# Patient Record
Sex: MALE | Race: WHITE | NOT HISPANIC OR LATINO | Employment: OTHER | URBAN - METROPOLITAN AREA
[De-identification: names, ages, dates, MRNs, and addresses within clinical notes are randomized per-mention and may not be internally consistent; named-entity substitution may affect disease eponyms.]

---

## 2017-01-03 ENCOUNTER — LAB VISIT (OUTPATIENT)
Dept: LAB | Facility: HOSPITAL | Age: 72
End: 2017-01-03
Attending: UROLOGY
Payer: COMMERCIAL

## 2017-01-03 ENCOUNTER — OFFICE VISIT (OUTPATIENT)
Dept: UROLOGY | Facility: CLINIC | Age: 72
End: 2017-01-03
Payer: COMMERCIAL

## 2017-01-03 VITALS
SYSTOLIC BLOOD PRESSURE: 139 MMHG | HEART RATE: 68 BPM | BODY MASS INDEX: 26.68 KG/M2 | RESPIRATION RATE: 16 BRPM | WEIGHT: 170 LBS | DIASTOLIC BLOOD PRESSURE: 75 MMHG | HEIGHT: 67 IN

## 2017-01-03 DIAGNOSIS — N13.8 BPH (BENIGN PROSTATIC HYPERTROPHY) WITH URINARY OBSTRUCTION: ICD-10-CM

## 2017-01-03 DIAGNOSIS — N13.8 BPH (BENIGN PROSTATIC HYPERTROPHY) WITH URINARY OBSTRUCTION: Primary | ICD-10-CM

## 2017-01-03 DIAGNOSIS — N40.1 BPH (BENIGN PROSTATIC HYPERTROPHY) WITH URINARY OBSTRUCTION: ICD-10-CM

## 2017-01-03 DIAGNOSIS — N40.2 PROSTATE NODULE: ICD-10-CM

## 2017-01-03 DIAGNOSIS — N40.2 PROSTATE NODULE: Primary | ICD-10-CM

## 2017-01-03 DIAGNOSIS — N40.1 BPH (BENIGN PROSTATIC HYPERTROPHY) WITH URINARY OBSTRUCTION: Primary | ICD-10-CM

## 2017-01-03 LAB
COMPLEXED PSA SERPL-MCNC: 2.1 NG/ML
TESTOST SERPL-MCNC: 545 NG/DL

## 2017-01-03 PROCEDURE — 1157F ADVNC CARE PLAN IN RCRD: CPT | Mod: S$GLB,,, | Performed by: UROLOGY

## 2017-01-03 PROCEDURE — 1160F RVW MEDS BY RX/DR IN RCRD: CPT | Mod: S$GLB,,, | Performed by: UROLOGY

## 2017-01-03 PROCEDURE — 1159F MED LIST DOCD IN RCRD: CPT | Mod: S$GLB,,, | Performed by: UROLOGY

## 2017-01-03 PROCEDURE — 3078F DIAST BP <80 MM HG: CPT | Mod: S$GLB,,, | Performed by: UROLOGY

## 2017-01-03 PROCEDURE — 99999 PR PBB SHADOW E&M-EST. PATIENT-LVL III: CPT | Mod: PBBFAC,,, | Performed by: UROLOGY

## 2017-01-03 PROCEDURE — 84153 ASSAY OF PSA TOTAL: CPT

## 2017-01-03 PROCEDURE — 84403 ASSAY OF TOTAL TESTOSTERONE: CPT

## 2017-01-03 PROCEDURE — 36415 COLL VENOUS BLD VENIPUNCTURE: CPT

## 2017-01-03 PROCEDURE — 3075F SYST BP GE 130 - 139MM HG: CPT | Mod: S$GLB,,, | Performed by: UROLOGY

## 2017-01-03 PROCEDURE — 1126F AMNT PAIN NOTED NONE PRSNT: CPT | Mod: S$GLB,,, | Performed by: UROLOGY

## 2017-01-03 PROCEDURE — 99213 OFFICE O/P EST LOW 20 MIN: CPT | Mod: S$GLB,,, | Performed by: UROLOGY

## 2017-01-03 NOTE — PROGRESS NOTES
Subjective:       Patient ID: Alejandro Wayne is a 71 y.o. male.    Chief Complaint: No chief complaint on file.    HPI Comments: A 69-year-old well known to me, here in followup.   He has had a history of prostate nodule and had a prostate biopsy back in 03/2008.   It was benign. Also had an episode of hematuria a couple of years ago.   Cystoscopy was normal at that time. He has had no subsequent episodes. Rare nocturia, ED not an issue. Rare urgency if he waits too long to void.  He has been doing very well.   He has noted some urgency, minimal bother.  Thelma doing well.  PSA and testosterone pending.    Lab Results       Component                Value               Date                       PSA                      2.1                 07/09/2015                 PSA                      1.9                 02/10/2014                 PSA                      2.32                04/16/2013                 PSA                      1.86                05/15/2012                 PSA                      1.6                 04/07/2011                 PSA                      1.6                 03/10/2009                 PSA                      1.6                 03/06/2008                 PSA                      1.5                 10/26/2006               Review of Systems   Constitutional: Negative for appetite change, chills, fatigue, fever and unexpected weight change.   HENT: Negative for nosebleeds.    Respiratory: Negative for shortness of breath and wheezing.    Cardiovascular: Negative for chest pain, palpitations and leg swelling.   Gastrointestinal: Negative for abdominal distention, abdominal pain, constipation, diarrhea, nausea and vomiting.   Genitourinary: Positive for urgency. Negative for dysuria, hematuria and nocturia.   Musculoskeletal: Negative for arthralgias and back pain.   Skin: Negative for pallor.   Neurological: Negative for dizziness, seizures and syncope.   Hematological: Negative for  adenopathy.   Psychiatric/Behavioral: Negative for dysphoric mood.       Objective:      Physical Exam   Nursing note and vitals reviewed.  Constitutional: He is oriented to person, place, and time. He appears well-developed and well-nourished. No distress.   HENT:   Head: Normocephalic and atraumatic.   Eyes: Pupils are equal, round, and reactive to light.   Neck: No tracheal deviation present. No thyromegaly present.   Cardiovascular: Normal rate.    Pulmonary/Chest: Effort normal. No respiratory distress. He has no wheezes.   Abdominal: Soft. Bowel sounds are normal. He exhibits no distension and no mass. There is no tenderness. There is no rebound and no guarding.   Genitourinary: Rectum normal, testes normal and penis normal. Rectal exam shows no external hemorrhoid, no internal hemorrhoid, no mass and no tenderness. Right testis shows no mass. Left testis shows no mass.       Genitourinary Comments: Stable nodule   Lymphadenopathy:     He has no cervical adenopathy.   Neurological: He is alert and oriented to person, place, and time.   Skin: Skin is warm and dry. He is not diaphoretic.     Psychiatric: He has a normal mood and affect. His behavior is normal. Judgment and thought content normal.       Assessment:       1. BPH (benign prostatic hypertrophy) with urinary obstruction        Plan:         Await PSA and testosterone.  Not interested in BPH therapy at this time.  1 year with PSA.

## 2017-01-03 NOTE — MR AVS SNAPSHOT
[facility]  [address]  [facility] [address]  Phone: [phone]                  Alejandro Wayne   1/3/2017 3:45 PM   Office Visit    Description:  Male : 1945   Provider:  Toñito Herman MD   Department:  [facility]           Reason for Visit     Prostate Nodule     Benign Prostatic Hypertrophy           Diagnoses this Visit        Comments    BPH (benign prostatic hypertrophy) with urinary obstruction    -  Primary            To Do List           Future Appointments        Provider Department Dept Phone    1/3/2017 3:45 PM Toñito Herman MD [facility] [phone]      Goals (5 Years of Data)     None      Follow-Up and Disposition     Return in about 1 year (around 1/3/2018).      [facility]     [facility] -  Assistance  Registered nurses in the [facility] provide clinical advisement, health education, appointment booking, and other advisory services.  Call for this free service at [phone].             Medications           Message regarding Medications     Verify the changes and/or additions to your medication regime listed below are the same as discussed with your clinician today.  If any of these changes or additions are incorrect, please notify your healthcare provider.        STOP taking these medications     hydrocodone-acetaminophen 7.5-325mg (NORCO) 7.5-325 mg per tablet Take 1 tablet by mouth every 6 (six) hours as needed for Pain.    typhoid (VIVOTIF) DR capsule Take one tablet every other day for 4 doses           Verify that the below list of medications is an accurate representation of the medications you are currently taking.  If none reported, the list may be blank. If incorrect, please contact your healthcare provider. Carry this list with you in case of emergency.           Current Medications     amlodipine (NORVASC) 5 MG tablet Take 1 tablet (5 mg total) by mouth once  "daily.    ascorbic acid (VITAMIN C) 1000 MG tablet Take 1,000 mg by mouth once daily.    aspirin (ECOTRIN) 81 MG EC tablet Take 81 mg by mouth once daily.    cetirizine (ZYRTEC) 10 MG tablet Take 10 mg by mouth once daily.    eszopiclone (LUNESTA) 2 MG Tab TAKE 1 TABLET BY MOUTH EVERY EVENING    multivitamin (MULTIVITAMIN) per tablet Take 1 tablet by mouth once daily.    rosuvastatin (CRESTOR) 10 MG tablet Take 1 tablet (10 mg total) by mouth once daily.    vitamin E 1000 UNIT capsule Take 1,000 Units by mouth once daily.    ciprofloxacin HCl (CILOXAN) 0.3 % ophthalmic solution PLACE 1 DROP INTO BOTH EYES EVERY 2 HOURS AS DIRECTED    cyclobenzaprine (FLEXERIL) 5 MG tablet            Clinical Reference Information           Vital Signs - Last Recorded  Most recent update: 1/3/2017  3:16 PM by Madina Sharpe RN    BP Pulse Resp Ht Wt BMI    139/75 (BP Location: Left arm, Patient Position: Sitting, BP Method: Automatic) 68 16 5' 7" (1.702 m) 77.1 kg (170 lb) 26.63 kg/m2      Blood Pressure          Most Recent Value    BP  139/75      Allergies as of 1/3/2017     No Known Allergies      Immunizations Administered on Date of Encounter - 1/3/2017     None      Orders Placed During Today's Visit     Future Labs/Procedures Expected by Expires    Prostate Specific Antigen, Diagnostic  1/1/2018 1/23/2019      "

## 2017-01-30 RX ORDER — AMLODIPINE BESYLATE 5 MG/1
TABLET ORAL
Qty: 90 TABLET | Refills: 0 | Status: SHIPPED | OUTPATIENT
Start: 2017-01-30 | End: 2018-05-08 | Stop reason: SDUPTHER

## 2017-01-30 RX ORDER — CIPROFLOXACIN 500 MG/1
TABLET ORAL
Qty: 20 TABLET | Refills: 3 | Status: SHIPPED | OUTPATIENT
Start: 2017-01-30 | End: 2017-05-26 | Stop reason: SDUPTHER

## 2017-05-01 RX ORDER — AMLODIPINE BESYLATE 5 MG/1
TABLET ORAL
Qty: 90 TABLET | Refills: 3 | Status: SHIPPED | OUTPATIENT
Start: 2017-05-01 | End: 2018-05-08 | Stop reason: SDUPTHER

## 2017-05-26 RX ORDER — CIPROFLOXACIN 500 MG/1
TABLET ORAL
Qty: 20 TABLET | Refills: 0 | Status: SHIPPED | OUTPATIENT
Start: 2017-05-26 | End: 2017-06-02 | Stop reason: SDUPTHER

## 2017-06-05 RX ORDER — CIPROFLOXACIN 500 MG/1
TABLET ORAL
Qty: 20 TABLET | Refills: 0 | Status: SHIPPED | OUTPATIENT
Start: 2017-06-05 | End: 2017-06-12 | Stop reason: SDUPTHER

## 2017-06-12 RX ORDER — CIPROFLOXACIN 500 MG/1
TABLET ORAL
Qty: 20 TABLET | Refills: 0 | Status: SHIPPED | OUTPATIENT
Start: 2017-06-12 | End: 2017-06-19 | Stop reason: SDUPTHER

## 2017-06-19 RX ORDER — CIPROFLOXACIN 500 MG/1
TABLET ORAL
Qty: 20 TABLET | Refills: 0 | Status: SHIPPED | OUTPATIENT
Start: 2017-06-19 | End: 2017-06-27 | Stop reason: SDUPTHER

## 2017-06-27 RX ORDER — CIPROFLOXACIN 500 MG/1
TABLET ORAL
Qty: 20 TABLET | Refills: 0 | Status: SHIPPED | OUTPATIENT
Start: 2017-06-27 | End: 2018-01-07 | Stop reason: SDUPTHER

## 2017-07-03 RX ORDER — ESZOPICLONE 2 MG/1
TABLET, FILM COATED ORAL
Qty: 30 TABLET | Refills: 5 | Status: SHIPPED | OUTPATIENT
Start: 2017-07-03 | End: 2018-01-15 | Stop reason: SDUPTHER

## 2017-07-12 RX ORDER — CIPROFLOXACIN 500 MG/1
TABLET ORAL
Qty: 20 TABLET | Refills: 0 | OUTPATIENT
Start: 2017-07-12

## 2017-10-23 ENCOUNTER — TELEPHONE (OUTPATIENT)
Dept: ENDOCRINOLOGY | Facility: CLINIC | Age: 72
End: 2017-10-23

## 2017-10-23 DIAGNOSIS — I10 HYPERTENSION, UNSPECIFIED TYPE: Primary | ICD-10-CM

## 2017-11-20 RX ORDER — DICYCLOMINE HYDROCHLORIDE 10 MG/1
10 CAPSULE ORAL EVERY 6 HOURS PRN
Qty: 60 CAPSULE | Refills: 3 | Status: SHIPPED | OUTPATIENT
Start: 2017-11-20 | End: 2017-12-20

## 2017-11-20 RX ORDER — ROSUVASTATIN CALCIUM 10 MG/1
TABLET, COATED ORAL
Qty: 90 TABLET | Refills: 3 | Status: SHIPPED | OUTPATIENT
Start: 2017-11-20 | End: 2018-06-04 | Stop reason: SDUPTHER

## 2018-01-08 RX ORDER — CIPROFLOXACIN 500 MG/1
TABLET ORAL
Qty: 20 TABLET | Refills: 0 | Status: SHIPPED | OUTPATIENT
Start: 2018-01-08 | End: 2018-05-08 | Stop reason: SDUPTHER

## 2018-01-16 RX ORDER — ESZOPICLONE 2 MG/1
2 TABLET, FILM COATED ORAL NIGHTLY
Qty: 30 TABLET | Refills: 5 | Status: SHIPPED | OUTPATIENT
Start: 2018-01-16 | End: 2018-07-24 | Stop reason: SDUPTHER

## 2018-03-13 ENCOUNTER — TELEPHONE (OUTPATIENT)
Dept: ENDOCRINOLOGY | Facility: CLINIC | Age: 73
End: 2018-03-13

## 2018-03-13 RX ORDER — OSELTAMIVIR PHOSPHATE 75 MG/1
75 CAPSULE ORAL 2 TIMES DAILY
Qty: 10 CAPSULE | Refills: 1 | Status: SHIPPED | OUTPATIENT
Start: 2018-03-13 | End: 2018-03-18

## 2018-05-08 RX ORDER — AMLODIPINE BESYLATE 5 MG/1
TABLET ORAL
Qty: 90 TABLET | Refills: 0 | OUTPATIENT
Start: 2018-05-08

## 2018-05-08 RX ORDER — CIPROFLOXACIN 500 MG/1
TABLET ORAL
Qty: 20 TABLET | Refills: 0 | OUTPATIENT
Start: 2018-05-08

## 2018-05-10 RX ORDER — AMLODIPINE BESYLATE 5 MG/1
5 TABLET ORAL DAILY
Qty: 90 TABLET | Refills: 0 | Status: SHIPPED | OUTPATIENT
Start: 2018-05-10 | End: 2018-06-04 | Stop reason: SDUPTHER

## 2018-05-10 RX ORDER — CIPROFLOXACIN 500 MG/1
500 TABLET ORAL 2 TIMES DAILY
Qty: 20 TABLET | Refills: 0 | Status: SHIPPED | OUTPATIENT
Start: 2018-05-10 | End: 2018-10-02

## 2018-06-04 ENCOUNTER — LAB VISIT (OUTPATIENT)
Dept: LAB | Facility: HOSPITAL | Age: 73
End: 2018-06-04
Attending: INTERNAL MEDICINE
Payer: COMMERCIAL

## 2018-06-04 ENCOUNTER — OFFICE VISIT (OUTPATIENT)
Dept: ENDOCRINOLOGY | Facility: CLINIC | Age: 73
End: 2018-06-04
Payer: COMMERCIAL

## 2018-06-04 VITALS
HEART RATE: 82 BPM | BODY MASS INDEX: 26.71 KG/M2 | SYSTOLIC BLOOD PRESSURE: 128 MMHG | WEIGHT: 170.19 LBS | HEIGHT: 67 IN | DIASTOLIC BLOOD PRESSURE: 74 MMHG

## 2018-06-04 DIAGNOSIS — I10 ESSENTIAL HYPERTENSION: Primary | ICD-10-CM

## 2018-06-04 DIAGNOSIS — E78.00 HIGH CHOLESTEROL: ICD-10-CM

## 2018-06-04 DIAGNOSIS — I10 HYPERTENSION, UNSPECIFIED TYPE: ICD-10-CM

## 2018-06-04 LAB
25(OH)D3+25(OH)D2 SERPL-MCNC: 32 NG/ML
ALBUMIN SERPL BCP-MCNC: 4 G/DL
ALP SERPL-CCNC: 60 U/L
ALT SERPL W/O P-5'-P-CCNC: 23 U/L
ANION GAP SERPL CALC-SCNC: 12 MMOL/L
AST SERPL-CCNC: 25 U/L
BASOPHILS # BLD AUTO: 0.1 K/UL
BASOPHILS NFR BLD: 1.6 %
BILIRUB SERPL-MCNC: 0.5 MG/DL
BUN SERPL-MCNC: 15 MG/DL
CALCIUM SERPL-MCNC: 9.3 MG/DL
CHLORIDE SERPL-SCNC: 106 MMOL/L
CHOLEST SERPL-MCNC: 136 MG/DL
CHOLEST/HDLC SERPL: 2.3 {RATIO}
CO2 SERPL-SCNC: 22 MMOL/L
COMPLEXED PSA SERPL-MCNC: 2.3 NG/ML
CREAT SERPL-MCNC: 0.8 MG/DL
DIFFERENTIAL METHOD: ABNORMAL
EOSINOPHIL # BLD AUTO: 0.2 K/UL
EOSINOPHIL NFR BLD: 3.1 %
ERYTHROCYTE [DISTWIDTH] IN BLOOD BY AUTOMATED COUNT: 13 %
ERYTHROCYTE [SEDIMENTATION RATE] IN BLOOD BY WESTERGREN METHOD: 4 MM/HR
EST. GFR  (AFRICAN AMERICAN): >60 ML/MIN/1.73 M^2
EST. GFR  (NON AFRICAN AMERICAN): >60 ML/MIN/1.73 M^2
GLUCOSE SERPL-MCNC: 108 MG/DL
HCT VFR BLD AUTO: 44.5 %
HDLC SERPL-MCNC: 58 MG/DL
HDLC SERPL: 42.6 %
HGB BLD-MCNC: 14.1 G/DL
IMM GRANULOCYTES # BLD AUTO: 0.03 K/UL
IMM GRANULOCYTES NFR BLD AUTO: 0.5 %
LDLC SERPL CALC-MCNC: 67.6 MG/DL
LYMPHOCYTES # BLD AUTO: 1.2 K/UL
LYMPHOCYTES NFR BLD: 19.1 %
MCH RBC QN AUTO: 29.4 PG
MCHC RBC AUTO-ENTMCNC: 31.7 G/DL
MCV RBC AUTO: 93 FL
MONOCYTES # BLD AUTO: 0.7 K/UL
MONOCYTES NFR BLD: 11.4 %
NEUTROPHILS # BLD AUTO: 3.9 K/UL
NEUTROPHILS NFR BLD: 64.3 %
NONHDLC SERPL-MCNC: 78 MG/DL
NRBC BLD-RTO: 0 /100 WBC
PLATELET # BLD AUTO: 246 K/UL
PMV BLD AUTO: 9.8 FL
POTASSIUM SERPL-SCNC: 4.4 MMOL/L
PROT SERPL-MCNC: 7.1 G/DL
RBC # BLD AUTO: 4.79 M/UL
SODIUM SERPL-SCNC: 140 MMOL/L
TRIGL SERPL-MCNC: 52 MG/DL
TSH SERPL DL<=0.005 MIU/L-ACNC: 2.33 UIU/ML
WBC # BLD AUTO: 6.07 K/UL

## 2018-06-04 PROCEDURE — 99999 PR PBB SHADOW E&M-EST. PATIENT-LVL III: CPT | Mod: PBBFAC,,, | Performed by: INTERNAL MEDICINE

## 2018-06-04 PROCEDURE — 85651 RBC SED RATE NONAUTOMATED: CPT

## 2018-06-04 PROCEDURE — 80053 COMPREHEN METABOLIC PANEL: CPT

## 2018-06-04 PROCEDURE — 99214 OFFICE O/P EST MOD 30 MIN: CPT | Mod: S$GLB,,, | Performed by: INTERNAL MEDICINE

## 2018-06-04 PROCEDURE — 84443 ASSAY THYROID STIM HORMONE: CPT

## 2018-06-04 PROCEDURE — 3074F SYST BP LT 130 MM HG: CPT | Mod: CPTII,S$GLB,, | Performed by: INTERNAL MEDICINE

## 2018-06-04 PROCEDURE — 3078F DIAST BP <80 MM HG: CPT | Mod: CPTII,S$GLB,, | Performed by: INTERNAL MEDICINE

## 2018-06-04 PROCEDURE — 84153 ASSAY OF PSA TOTAL: CPT

## 2018-06-04 PROCEDURE — 82306 VITAMIN D 25 HYDROXY: CPT

## 2018-06-04 PROCEDURE — 80061 LIPID PANEL: CPT

## 2018-06-04 PROCEDURE — 85025 COMPLETE CBC W/AUTO DIFF WBC: CPT

## 2018-06-04 PROCEDURE — 36415 COLL VENOUS BLD VENIPUNCTURE: CPT

## 2018-06-04 RX ORDER — ROSUVASTATIN CALCIUM 10 MG/1
10 TABLET, COATED ORAL DAILY
Qty: 90 TABLET | Refills: 3 | Status: SHIPPED | OUTPATIENT
Start: 2018-06-04 | End: 2019-02-20 | Stop reason: SDUPTHER

## 2018-06-04 RX ORDER — AMLODIPINE BESYLATE 5 MG/1
5 TABLET ORAL DAILY
Qty: 90 TABLET | Refills: 3 | Status: SHIPPED | OUTPATIENT
Start: 2018-06-04 | End: 2019-02-20 | Stop reason: SDUPTHER

## 2018-06-04 NOTE — PROGRESS NOTES
"Subjective:      Patient ID: Alejandro Wayne is a 73 y.o. male.    Chief Complaint:  Follow-up    History of Present Illness  70 y.o. Wm with pmhx HTN, HLD, chronic b/l Knee pain, hx prostate nodule s/p resection, hx of SCC of thigh s/p shave biopsy here for executive wellness visit complaining of chronic diarrhea. Patient reports episodes began approximately 4 months ago after having an episode of "bone-shaking" chills. Patient reports having to jump into the shower to stay warm. Reports he has been having both increased belching as well as flatulence ever since then. Reports greenish stool, sometimes watery, mixed with both floating mucous component and heavier components that sink. Reports very foul smelling. Denies any blood or dark tarry stools. Reports his wife has also started having such symptoms, but only once a day. Patient does report he takes mobic 15 mg daily, but also has been taking an advil every morning when he wakes up and one before he goes to sleep. Patient also reports taking tylenol PM to help him sleep at night. In addition to these medications, he drinks 3-4 glasses of champagne nightly. Patient does report remote history of travelling to [place] before these episodes began. Denies fever, fatigue. Reports energy has been unchanged. Does have ~5# weight loss which he has been trying to lose through proper diet. He denies chest pain, shortness of breath, nausea. Does report mild reflux symptoms. Denies any changes to urination, dysuria or hematuria. Does report chronic bilateral knee pain which he takes previously mentioned NSAIDs and tylenol for. 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 His last C-scope was 2009 which showed diverticulosis and is to have repeat in 7 years (due 4/2016). Patient did stool sampling after previous visit which was unrevealing.     Review of Systems   Constitutional: Positive " for chills (2 episodes in the past 4 months, first time when patient started experiencing diarrhea). Negative for fatigue, fever and unexpected weight change.   HENT: Negative for congestion, hearing loss, sinus pressure, sore throat and trouble swallowing.    Eyes: Negative for redness, itching and visual disturbance.   Respiratory: Negative for cough, chest tightness, shortness of breath and wheezing.    Cardiovascular: Negative for chest pain, palpitations and leg swelling.   Gastrointestinal: Negative for abdominal distention, abdominal pain, blood in stool, constipation, nausea and vomiting.        (+) tenesmus (+) 4-5 loose green stools per day (+) change in stool caliber (+)increased flattus and belching   Endocrine: Negative for cold intolerance, heat intolerance, polydipsia and polyuria.   Genitourinary: Negative for dysuria, flank pain, frequency and hematuria.   Musculoskeletal: Positive for arthralgias (bilateral knee pain). Negative for back pain, gait problem and myalgias.   Skin: Negative for color change, pallor and rash.   Neurological: Negative for dizziness, syncope, weakness, numbness and headaches.   Hematological: Does not bruise/bleed easily.   Psychiatric/Behavioral: Negative for dysphoric mood and sleep disturbance. The patient is not nervous/anxious.      Objective:   Physical Exam   Constitutional: He is oriented to person, place, and time. He appears well-developed and well-nourished. No distress.   HENT:   Head: Normocephalic and atraumatic.   Nose: Nose normal.   Mouth/Throat: Oropharynx is clear and moist. No oropharyngeal exudate.   Eyes: EOM are normal. Pupils are equal, round, and reactive to light. No scleral icterus.   Neck: Normal range of motion. Neck supple. No JVD present. No tracheal deviation present. No thyromegaly present.   Cardiovascular: Normal rate, regular rhythm and normal heart sounds.  Exam reveals no gallop and no friction rub.    No murmur  "heard.  Pulmonary/Chest: Effort normal and breath sounds normal. No respiratory distress. He has no wheezes. He has no rales.   Abdominal: Soft. Bowel sounds are normal. He exhibits no distension, no abdominal bruit and no mass. There is no tenderness. There is no rebound, no guarding, no tenderness at McBurney's point and negative Rodriguez's sign. No hernia.   Musculoskeletal: Normal range of motion. He exhibits no edema or tenderness.   Lymphadenopathy:     He has no cervical adenopathy.   Neurological: He is alert and oriented to person, place, and time. He has normal strength. No cranial nerve deficit or sensory deficit. He exhibits normal muscle tone. Coordination and gait normal.   Reflex Scores:       Tricep reflexes are 2+ on the right side and 2+ on the left side.       Bicep reflexes are 2+ on the right side and 2+ on the left side.       Brachioradialis reflexes are 2+ on the right side and 2+ on the left side.       Patellar reflexes are 2+ on the right side and 2+ on the left side.       Achilles reflexes are 2+ on the right side and 2+ on the left side.  Skin: Skin is warm and dry. No rash noted. He is not diaphoretic. No pallor.   Psychiatric: He has a normal mood and affect. His behavior is normal. Judgment and thought content normal.       /74   Pulse 82   Ht 5' 7" (1.702 m)   Wt 77.2 kg (170 lb 3.1 oz)   BMI 26.66 kg/m²      Lab Review:   Results for orders placed or performed in visit on 06/04/18   CBC auto differential   Result Value Ref Range    WBC 6.07 3.90 - 12.70 K/uL    RBC 4.79 4.60 - 6.20 M/uL    Hemoglobin 14.1 14.0 - 18.0 g/dL    Hematocrit 44.5 40.0 - 54.0 %    MCV 93 82 - 98 fL    MCH 29.4 27.0 - 31.0 pg    MCHC 31.7 (L) 32.0 - 36.0 g/dL    RDW 13.0 11.5 - 14.5 %    Platelets 246 150 - 350 K/uL    MPV 9.8 9.2 - 12.9 fL    Immature Granulocytes 0.5 0.0 - 0.5 %    Gran # (ANC) 3.9 1.8 - 7.7 K/uL    Immature Grans (Abs) 0.03 0.00 - 0.04 K/uL    Lymph # 1.2 1.0 - 4.8 K/uL    Mono " # 0.7 0.3 - 1.0 K/uL    Eos # 0.2 0.0 - 0.5 K/uL    Baso # 0.10 0.00 - 0.20 K/uL    nRBC 0 0 /100 WBC    Gran% 64.3 38.0 - 73.0 %    Lymph% 19.1 18.0 - 48.0 %    Mono% 11.4 4.0 - 15.0 %    Eosinophil% 3.1 0.0 - 8.0 %    Basophil% 1.6 0.0 - 1.9 %    Differential Method Automated      Results for orders placed or performed in visit on 06/04/18   CBC auto differential   Result Value Ref Range    WBC 6.07 3.90 - 12.70 K/uL    RBC 4.79 4.60 - 6.20 M/uL    Hemoglobin 14.1 14.0 - 18.0 g/dL    Hematocrit 44.5 40.0 - 54.0 %    MCV 93 82 - 98 fL    MCH 29.4 27.0 - 31.0 pg    MCHC 31.7 (L) 32.0 - 36.0 g/dL    RDW 13.0 11.5 - 14.5 %    Platelets 246 150 - 350 K/uL    MPV 9.8 9.2 - 12.9 fL    Immature Granulocytes 0.5 0.0 - 0.5 %    Gran # (ANC) 3.9 1.8 - 7.7 K/uL    Immature Grans (Abs) 0.03 0.00 - 0.04 K/uL    Lymph # 1.2 1.0 - 4.8 K/uL    Mono # 0.7 0.3 - 1.0 K/uL    Eos # 0.2 0.0 - 0.5 K/uL    Baso # 0.10 0.00 - 0.20 K/uL    nRBC 0 0 /100 WBC    Gran% 64.3 38.0 - 73.0 %    Lymph% 19.1 18.0 - 48.0 %    Mono% 11.4 4.0 - 15.0 %    Eosinophil% 3.1 0.0 - 8.0 %    Basophil% 1.6 0.0 - 1.9 %    Differential Method Automated         Assessment:       Plan:     70 y.o. Wm with pmhx HTN, HLD, chronic b/l Knee pain, hx prostate nodule s/p resection, hx of SCC of thigh s/p shave biopsy here for executive wellness visit complaining of chronic diarrhea    1. Chronic diarrhea   - ddx very broad --- he has been using NSAIDs daily which can cause GI upset; recent travel to [place] concerning with tropical sprue; possible celiac disease   - will obtain CBC, CMP, TSH, hep panel, Vit D, Amylase, Lipase, CRP, ESR, repeat stool studies, Celiac disease panel   - will obtain imaging -- US abdomen complete and CT abdomen w/ and w/o contrast unless GI decides they are unwarratned   - referred to GI for evaluation; has appointment on 11/24/15. Patient due for c-scope in 4 months, possibly both EGD and C-scope?  2. HTN   - controlled, at goal,  continue current meds   - no hypotensive episodes  3. HLD   - on Crestor 10 mg  4. B/l chronic knee pain   - suggested patient stop taking NSAIDs and just continue with tylenol for now to see if his GI symptoms improve    Overall pt seems to be in good health.

## 2018-07-30 RX ORDER — ESZOPICLONE 2 MG/1
TABLET, FILM COATED ORAL
Qty: 30 TABLET | Refills: 0 | Status: SHIPPED | OUTPATIENT
Start: 2018-07-30 | End: 2018-09-11 | Stop reason: SDUPTHER

## 2018-08-29 ENCOUNTER — PATIENT MESSAGE (OUTPATIENT)
Dept: DERMATOLOGY | Facility: CLINIC | Age: 73
End: 2018-08-29

## 2018-09-11 RX ORDER — ESZOPICLONE 2 MG/1
TABLET, FILM COATED ORAL
Qty: 30 TABLET | Refills: 5 | Status: SHIPPED | OUTPATIENT
Start: 2018-09-11 | End: 2018-09-19 | Stop reason: ALTCHOICE

## 2018-09-19 ENCOUNTER — TELEPHONE (OUTPATIENT)
Dept: INFECTIOUS DISEASES | Facility: CLINIC | Age: 73
End: 2018-09-19

## 2018-09-19 DIAGNOSIS — Z71.84 COUNSELING FOR TRAVEL: Primary | ICD-10-CM

## 2018-09-19 RX ORDER — ZOLPIDEM TARTRATE 5 MG/1
5 TABLET ORAL NIGHTLY PRN
Qty: 15 TABLET | Refills: 0 | Status: SHIPPED | OUTPATIENT
Start: 2018-09-19 | End: 2018-10-02

## 2018-09-19 RX ORDER — AZITHROMYCIN 500 MG/1
TABLET, FILM COATED ORAL
Qty: 8 TABLET | Refills: 0 | Status: SHIPPED | OUTPATIENT
Start: 2018-09-19 | End: 2018-10-02

## 2018-09-30 ENCOUNTER — PATIENT MESSAGE (OUTPATIENT)
Dept: ENDOSCOPY | Facility: HOSPITAL | Age: 73
End: 2018-09-30

## 2018-09-30 ENCOUNTER — TELEPHONE (OUTPATIENT)
Dept: ENDOSCOPY | Facility: HOSPITAL | Age: 73
End: 2018-09-30

## 2018-09-30 DIAGNOSIS — R14.3 FLATUS: ICD-10-CM

## 2018-09-30 DIAGNOSIS — R14.0 BLOATING: Primary | ICD-10-CM

## 2018-09-30 DIAGNOSIS — K59.00 CONSTIPATION, ACUTE: ICD-10-CM

## 2018-10-02 ENCOUNTER — OFFICE VISIT (OUTPATIENT)
Dept: GASTROENTEROLOGY | Facility: CLINIC | Age: 73
End: 2018-10-02
Payer: COMMERCIAL

## 2018-10-02 ENCOUNTER — TELEPHONE (OUTPATIENT)
Dept: GASTROENTEROLOGY | Facility: CLINIC | Age: 73
End: 2018-10-02

## 2018-10-02 ENCOUNTER — HOSPITAL ENCOUNTER (OUTPATIENT)
Dept: RADIOLOGY | Facility: HOSPITAL | Age: 73
Discharge: HOME | End: 2018-10-02
Attending: INTERNAL MEDICINE
Payer: COMMERCIAL

## 2018-10-02 VITALS
WEIGHT: 172.38 LBS | HEART RATE: 67 BPM | HEIGHT: 67 IN | SYSTOLIC BLOOD PRESSURE: 124 MMHG | DIASTOLIC BLOOD PRESSURE: 78 MMHG | BODY MASS INDEX: 27.06 KG/M2

## 2018-10-02 DIAGNOSIS — Z87.19 HISTORY OF DIVERTICULITIS: ICD-10-CM

## 2018-10-02 DIAGNOSIS — R10.32 LLQ PAIN: Primary | ICD-10-CM

## 2018-10-02 DIAGNOSIS — R19.4 CHANGE IN BOWEL HABITS: ICD-10-CM

## 2018-10-02 DIAGNOSIS — R10.32 LLQ PAIN: ICD-10-CM

## 2018-10-02 DIAGNOSIS — R10.84 GENERALIZED ABDOMINAL PAIN: ICD-10-CM

## 2018-10-02 PROCEDURE — 25500020 PHARM REV CODE 255: Performed by: INTERNAL MEDICINE

## 2018-10-02 PROCEDURE — 99999 PR PBB SHADOW E&M-EST. PATIENT-LVL III: CPT | Mod: PBBFAC,,, | Performed by: INTERNAL MEDICINE

## 2018-10-02 PROCEDURE — 1101F PT FALLS ASSESS-DOCD LE1/YR: CPT | Mod: CPTII,S$GLB,, | Performed by: INTERNAL MEDICINE

## 2018-10-02 PROCEDURE — 3074F SYST BP LT 130 MM HG: CPT | Mod: CPTII,S$GLB,, | Performed by: INTERNAL MEDICINE

## 2018-10-02 PROCEDURE — 74178 CT ABD&PLV WO CNTR FLWD CNTR: CPT | Mod: 26,,, | Performed by: RADIOLOGY

## 2018-10-02 PROCEDURE — 99214 OFFICE O/P EST MOD 30 MIN: CPT | Mod: S$GLB,,, | Performed by: INTERNAL MEDICINE

## 2018-10-02 PROCEDURE — 3078F DIAST BP <80 MM HG: CPT | Mod: CPTII,S$GLB,, | Performed by: INTERNAL MEDICINE

## 2018-10-02 PROCEDURE — 74178 CT ABD&PLV WO CNTR FLWD CNTR: CPT | Mod: TC

## 2018-10-02 RX ORDER — AMOXICILLIN AND CLAVULANATE POTASSIUM 875; 125 MG/1; MG/1
1 TABLET, FILM COATED ORAL EVERY 12 HOURS
Qty: 20 TABLET | Refills: 0 | Status: SHIPPED | OUTPATIENT
Start: 2018-10-02 | End: 2018-10-09

## 2018-10-02 RX ADMIN — IOHEXOL 100 ML: 350 INJECTION, SOLUTION INTRAVENOUS at 06:10

## 2018-10-02 RX ADMIN — IOHEXOL 15 ML: 350 INJECTION, SOLUTION INTRAVENOUS at 05:10

## 2018-10-02 NOTE — PROGRESS NOTES
CHIEF COMPLAINT:  Constipation, left lower quadrant pain and history of   diverticulitis in the past.    HISTORY OF PRESENT ILLNESS:  This is a 73-year-old white male who states he has   had a several day history of left lower quadrant pain and constipation.  He is   about ready to go on a big trip tomorrow for about 3 days and then will be going   on a long trip a week later for about 3 weeks.  He denies any trauma to his   belly.  He has had a history of diverticulitis diagnosed by a CT scan in the   past back in November 2015, for which he took antibiotics and saw Colorectal   Surgery.  His colonoscopy is up-to-date, last on 06/22/2016.  He is not having   any fever or chills.  He does have belching and flatus.  He has not been on any   recent antibiotics.    REVIEW OF SYSTEMS:  CONSTITUTIONAL:  No fever or fatigue.  No weight loss.  ENT:  No difficulty swallowing.  No sore throat.  No odynophagia.  No dysphagia.  CARDIOVASCULAR:  No chest pain or palpitations.  RESPIRATORY:  No shortness of breath or cough.  GENITOURINARY:  No dysuria, urgency, frequency or hematuria.  No urinary   symptoms.  MUSCULOSKELETAL:  A little bit of osteoarthritis in his knees.  SKIN:  No itching or rash.  NEUROLOGIC:  No headache, syncope or stroke.  PSYCHIATRIC:  No uncontrolled depression or anxiety.  ENDOCRINE:  No cold or heat intolerance.  LYMPHATICS:  No lymphadenopathy.  GASTROINTESTINAL:  No nausea or vomiting.  No heartburn.  He does have some   belching and some flatus.  He is constipated.  He feels like a change in stool   caliber.    ALLERGIES:  HE HAS NO KNOWN DRUG ALLERGIES.    PAST MEDICAL HISTORY:  Positive for hypertension, hyperlipidemia, BPH, chronic   knee injury and history of diverticulitis in the past.    PAST SURGICAL HISTORY:  Knee arthroscopic surgery with debridement, hiatal   hernia repair, tonsillectomy, adenoidectomy and vasectomy.    FAMILY HISTORY:  Nobody with colon cancer.  Nobody with advanced colon    adenomatous polyps.  No FAP, no attenuated FAP, no MAP and no Maldonado syndrome.    Nobody with celiac sprue or inflammatory bowel disease.  Nobody with pancreatic   cancer, cirrhosis of the liver, liver cancer or pancreatitis.  No GI   malignancies.    SOCIAL HISTORY:  A nonsmoker.  Does not really drink alcohol anymore.  He is a   .  He is .  He has 2 children, a son who is about 39   and a daughter who is 40.    PHYSICAL EXAMINATION:  VITAL SIGNS:  With chaperone in the room, Ira, blood pressure is 124/78,   pulse is 67 and regular, 5 feet 7 inches tall, 172 pounds and BMI is 27.  GENERAL:  He is alert and oriented x4, not in any acute distress.  ABDOMEN:  Soft.  No guarding.  No rebound.  He has tenderness in his left lower   quadrant.  No Rodriguez sign.  No McBurney point tenderness.  No CVA tenderness.    Normoactive bowel sounds.  No bruits or pulsatile masses.  CARDIOVASCULAR:  S1 and S2 without murmurs, gallops or rubs.  RESPIRATORY:  Clear to auscultation bilaterally without wheezes, rhonchi or   rales.  SKIN:  No petechiae or rash on exposed skin areas.  NEUROLOGIC:  Alert and oriented x4.  PSYCHIATRIC:  Normal speech, mentation and affect.  LYMPHATICS:  No cervical or supraclavicular lymphadenopathy.  ENDOCRINE:  No appreciative thyromegaly.  SKIN:  No rash.  Nothing appears to be shingles.    MEDICAL DECISION MAKING:  As above.  Prior CT scan images reviewed.  Last   colonoscopy images reviewed.  CT scan talk given with IV contrast.  Antibiotic   talk for possible diverticulitis.    IMPRESSION AND PLAN:  Change in bowel habits, left lower quadrant pain and   history of diverticulitis, proved by CT in the past.  The patient is about ready   to go on a trip tomorrow for 3 days and then a week after that when he gets   back, he will be going on a trip for 3 weeks.  We will set him up for an urgent   CT scan today.  We sent him a prescription for Augmentin 875 mg p.o. every 12    hours for 10 days.  He is not to take it until he hears about his CT scan   results.  We did that just in case he is unable to get the prescription tomorrow   or before he plans to leave.  He knows that there could be findings that we   would recommend he avoid this trip to [place] tomorrow.  He will call us for lab   results tomorrow.  We did send for CT scan results tomorrow.  We did get lab   work.  His lab work today looks good.  Basic metabolic panel along with hepatic   function, CBC, lipase and TSH look good.      SEC/IN  dd: 10/02/2018 15:45:46 (CDT)  td: 10/02/2018 16:35:43 (CDT)  Doc ID   #[id number]  Job ID #[id number]    CC:

## 2018-10-02 NOTE — TELEPHONE ENCOUNTER
----- Message from Sarah Forman MA sent at 10/2/2018  8:14 AM CDT -----  Contact: [phone]  howard were you trying to reach tammi mackey about his appt today?  he is downstairs donating blood.  did you want him to come at 11:30 or keep the 4:00 today?     [phone]

## 2018-10-03 ENCOUNTER — TELEPHONE (OUTPATIENT)
Dept: GASTROENTEROLOGY | Facility: CLINIC | Age: 73
End: 2018-10-03

## 2018-10-03 DIAGNOSIS — K76.89 LIVER CYST: ICD-10-CM

## 2018-10-03 DIAGNOSIS — K57.32 SIGMOID DIVERTICULITIS: ICD-10-CM

## 2018-10-03 DIAGNOSIS — R19.4 CHANGE IN BOWEL HABITS: ICD-10-CM

## 2018-10-03 DIAGNOSIS — R10.13 DYSPEPSIA: Primary | ICD-10-CM

## 2018-10-03 NOTE — TELEPHONE ENCOUNTER
----- Message from Marco Mathews MD sent at 10/3/2018 11:41 AM CDT -----  Ira - please order US RUQ to follow up liver cyst.    Liver: The liver is normal in size and attenuation. There are several stable hepatic hypodensities.  The most conspicuous somewhat lobulated lesion in the right lobe of the liver measures 3.5 cm (axial series 2, image 32).  This lesion is stable in size.  These hypodensities are most likely consistent with hepatic cysts.

## 2018-10-03 NOTE — TELEPHONE ENCOUNTER
----- Message from Marco Mathews MD sent at 10/3/2018 11:36 AM CDT -----  Ira - please tell Alejandro that his small lung nodule is stable but recommend he follow this up with his primary care MD to determine if any follow up at all is recommended.    Recommend follow up with me next Tuesday in clinic for recheck.    Also please tell his his CT scan shows diverticulitis as discuss yesterday and advance diet as tolerated and take his Augmentin 875mg pill every 12 hours for 10-14 days.    Recommend follow up Colonoscopy in 5-6 weeks - orders placed.    Contains abnormal data CT Abdomen Pelvis W Wo Contrast     Impression      1.  Multiple colonic diverticula with irregular luminal narrowing and wall thickening as well as engorged pericolonic vessels in the distal sigmoid colon most consistent with sigmoid diverticulitis.  Follow-up colonoscopy when the symptoms resolve may be helpful.    2.  Multiple stable hypodensities in the liver as above.  These are most consistent with hepatic cysts.    3.  Stable 0.3 cm soft tissue nodule in the right lower lobe of the lung.    4.  Other findings as above.    This report was flagged in Epic as abnormal.

## 2018-10-04 ENCOUNTER — TELEPHONE (OUTPATIENT)
Dept: GASTROENTEROLOGY | Facility: CLINIC | Age: 73
End: 2018-10-04

## 2018-10-04 ENCOUNTER — DOCUMENTATION ONLY (OUTPATIENT)
Dept: GASTROENTEROLOGY | Facility: CLINIC | Age: 73
End: 2018-10-04

## 2018-10-04 DIAGNOSIS — R25.2 CRAMP AND SPASM: Primary | ICD-10-CM

## 2018-10-04 RX ORDER — METRONIDAZOLE 500 MG/1
500 TABLET ORAL EVERY 8 HOURS
Qty: 42 TABLET | Refills: 0 | Status: SHIPPED | OUTPATIENT
Start: 2018-10-04 | End: 2018-10-09 | Stop reason: SDUPTHER

## 2018-10-04 RX ORDER — DICYCLOMINE HYDROCHLORIDE 10 MG/1
10 CAPSULE ORAL EVERY 12 HOURS PRN
Qty: 60 CAPSULE | Refills: 0 | Status: SHIPPED | OUTPATIENT
Start: 2018-10-04 | End: 2018-11-03

## 2018-10-04 RX ORDER — CIPROFLOXACIN 500 MG/1
500 TABLET ORAL EVERY 12 HOURS
Qty: 28 TABLET | Refills: 0 | Status: SHIPPED | OUTPATIENT
Start: 2018-10-04 | End: 2018-10-09 | Stop reason: SDUPTHER

## 2018-10-04 NOTE — PROGRESS NOTES
I spoke with Alejandro on the phone today.  He is doing much better.  He has been   taking his Augmentin 875 mg every 12 hours.  He has been on a restricted diet.    He is slowly advancing his diet.  He is feeling well.  No fever, no chills, no   abdominal pain.  He does have an order in for an EGD and a colonoscopy in about   five weeks for followup.  He will schedule that.  He is planning on leaving town   on Wednesday, 10/10/2018.  He is going on a bucket list trip to [place] and   [place].  He will be going for a little over three weeks.  Because he is   going to be out, he has requested a backup antibiotic in case he needs it on his   trip.  He knows not to take it until he notifies us that he is having symptoms   return for at least a day.  We will give him Cipro and Flagyl.  We will give him   enough for two weeks, although he may not need to take it that longer or take   it at all.  He knows to seek medical care over there if his symptoms are severe.    He says that is not going to be a problem and he will be touching base with me   periodically until he gets back in town.  He has a followup for his ultrasound   for his liver as well and he has requested a prescription for Bentyl to take.    He does know about the problem with urinary retention.  He says he has no   problem with that.  He says he is only going to take it very sparingly if he   needs it, may be takes it once a month, but he would like to have it on the trip   and he knows not to take his Zyrtec with it because of potential interaction.      SEC/HN  dd: 10/04/2018 08:04:03 (CDT)  td: 10/04/2018 22:41:52 (CDT)  Doc ID   #[id number]  Job ID #[id number]    CC:

## 2018-10-08 ENCOUNTER — LAB VISIT (OUTPATIENT)
Dept: LAB | Facility: OTHER | Age: 73
End: 2018-10-08
Attending: INTERNAL MEDICINE
Payer: COMMERCIAL

## 2018-10-08 DIAGNOSIS — R50.9 FEVER AND CHILLS: ICD-10-CM

## 2018-10-08 LAB
BASOPHILS # BLD AUTO: 0.03 K/UL
BASOPHILS NFR BLD: 0.1 %
DIFFERENTIAL METHOD: ABNORMAL
EOSINOPHIL # BLD AUTO: 0 K/UL
EOSINOPHIL NFR BLD: 0 %
ERYTHROCYTE [DISTWIDTH] IN BLOOD BY AUTOMATED COUNT: 13 %
ERYTHROCYTE [SEDIMENTATION RATE] IN BLOOD: 16 MM/HR
HCT VFR BLD AUTO: 44 %
HGB BLD-MCNC: 14.6 G/DL
LYMPHOCYTES # BLD AUTO: 0.9 K/UL
LYMPHOCYTES NFR BLD: 4 %
MCH RBC QN AUTO: 29.7 PG
MCHC RBC AUTO-ENTMCNC: 33.2 G/DL
MCV RBC AUTO: 89 FL
MONOCYTES # BLD AUTO: 1.4 K/UL
MONOCYTES NFR BLD: 6.5 %
NEUTROPHILS # BLD AUTO: 19.2 K/UL
NEUTROPHILS NFR BLD: 89.1 %
PLATELET # BLD AUTO: 307 K/UL
PMV BLD AUTO: 10.1 FL
RBC # BLD AUTO: 4.92 M/UL
WBC # BLD AUTO: 21.51 K/UL

## 2018-10-08 PROCEDURE — 85651 RBC SED RATE NONAUTOMATED: CPT

## 2018-10-08 PROCEDURE — 85025 COMPLETE CBC W/AUTO DIFF WBC: CPT

## 2018-10-08 PROCEDURE — 36415 COLL VENOUS BLD VENIPUNCTURE: CPT

## 2018-10-09 ENCOUNTER — LAB VISIT (OUTPATIENT)
Dept: LAB | Facility: OTHER | Age: 73
End: 2018-10-09
Attending: INTERNAL MEDICINE
Payer: COMMERCIAL

## 2018-10-09 ENCOUNTER — TELEPHONE (OUTPATIENT)
Dept: GASTROENTEROLOGY | Facility: CLINIC | Age: 73
End: 2018-10-09

## 2018-10-09 ENCOUNTER — PATIENT MESSAGE (OUTPATIENT)
Dept: GASTROENTEROLOGY | Facility: CLINIC | Age: 73
End: 2018-10-09

## 2018-10-09 DIAGNOSIS — K57.92 DIVERTICULITIS: Primary | ICD-10-CM

## 2018-10-09 DIAGNOSIS — R50.9 FEVER, UNSPECIFIED FEVER CAUSE: ICD-10-CM

## 2018-10-09 LAB
BASOPHILS # BLD AUTO: 0.04 K/UL
BASOPHILS NFR BLD: 0.2 %
DIFFERENTIAL METHOD: ABNORMAL
EOSINOPHIL # BLD AUTO: 0.1 K/UL
EOSINOPHIL NFR BLD: 0.3 %
ERYTHROCYTE [DISTWIDTH] IN BLOOD BY AUTOMATED COUNT: 13 %
HCT VFR BLD AUTO: 40.6 %
HGB BLD-MCNC: 13.9 G/DL
LYMPHOCYTES # BLD AUTO: 1.1 K/UL
LYMPHOCYTES NFR BLD: 5.4 %
MCH RBC QN AUTO: 30 PG
MCHC RBC AUTO-ENTMCNC: 34.2 G/DL
MCV RBC AUTO: 88 FL
MONOCYTES # BLD AUTO: 1.8 K/UL
MONOCYTES NFR BLD: 8.6 %
NEUTROPHILS # BLD AUTO: 17.5 K/UL
NEUTROPHILS NFR BLD: 85.1 %
PLATELET # BLD AUTO: 264 K/UL
PMV BLD AUTO: 9.7 FL
RBC # BLD AUTO: 4.64 M/UL
WBC # BLD AUTO: 20.57 K/UL

## 2018-10-09 PROCEDURE — 85025 COMPLETE CBC W/AUTO DIFF WBC: CPT

## 2018-10-09 PROCEDURE — 36415 COLL VENOUS BLD VENIPUNCTURE: CPT

## 2018-10-09 RX ORDER — CIPROFLOXACIN 500 MG/1
500 TABLET ORAL EVERY 12 HOURS
Qty: 28 TABLET | Refills: 0 | Status: SHIPPED | OUTPATIENT
Start: 2018-10-09 | End: 2018-10-23

## 2018-10-09 RX ORDER — METRONIDAZOLE 500 MG/1
500 TABLET ORAL EVERY 8 HOURS
Qty: 42 TABLET | Refills: 0 | Status: SHIPPED | OUTPATIENT
Start: 2018-10-09 | End: 2018-10-23

## 2018-10-10 ENCOUNTER — LAB VISIT (OUTPATIENT)
Dept: LAB | Facility: OTHER | Age: 73
End: 2018-10-10
Attending: INTERNAL MEDICINE
Payer: COMMERCIAL

## 2018-10-10 DIAGNOSIS — R80.9 URINE PROTEIN INCREASED: Primary | ICD-10-CM

## 2018-10-10 DIAGNOSIS — R50.9 FEVER, UNSPECIFIED FEVER CAUSE: ICD-10-CM

## 2018-10-10 LAB
BASOPHILS # BLD AUTO: 0.03 K/UL
BASOPHILS NFR BLD: 0.2 %
DIFFERENTIAL METHOD: ABNORMAL
EOSINOPHIL # BLD AUTO: 0.1 K/UL
EOSINOPHIL NFR BLD: 1 %
ERYTHROCYTE [DISTWIDTH] IN BLOOD BY AUTOMATED COUNT: 13 %
HCT VFR BLD AUTO: 39.7 %
HGB BLD-MCNC: 13.6 G/DL
LYMPHOCYTES # BLD AUTO: 1.1 K/UL
LYMPHOCYTES NFR BLD: 8.9 %
MCH RBC QN AUTO: 29.4 PG
MCHC RBC AUTO-ENTMCNC: 34.3 G/DL
MCV RBC AUTO: 86 FL
MONOCYTES # BLD AUTO: 1.2 K/UL
MONOCYTES NFR BLD: 9.3 %
NEUTROPHILS # BLD AUTO: 10.2 K/UL
NEUTROPHILS NFR BLD: 80.4 %
PLATELET # BLD AUTO: 245 K/UL
PMV BLD AUTO: 9.8 FL
RBC # BLD AUTO: 4.62 M/UL
WBC # BLD AUTO: 12.64 K/UL

## 2018-10-10 PROCEDURE — 36415 COLL VENOUS BLD VENIPUNCTURE: CPT

## 2018-10-10 PROCEDURE — 85025 COMPLETE CBC W/AUTO DIFF WBC: CPT

## 2018-10-26 ENCOUNTER — PATIENT MESSAGE (OUTPATIENT)
Dept: GASTROENTEROLOGY | Facility: CLINIC | Age: 73
End: 2018-10-26

## 2019-02-07 ENCOUNTER — LAB VISIT (OUTPATIENT)
Dept: LAB | Facility: OTHER | Age: 74
End: 2019-02-07
Attending: INTERNAL MEDICINE
Payer: COMMERCIAL

## 2019-02-07 DIAGNOSIS — R45.86 MOOD SWINGS: ICD-10-CM

## 2019-02-07 DIAGNOSIS — Z12.5 SPECIAL SCREENING FOR MALIGNANT NEOPLASM OF PROSTATE: ICD-10-CM

## 2019-02-07 DIAGNOSIS — Z11.59 NEED FOR HEPATITIS C SCREENING TEST: ICD-10-CM

## 2019-02-07 PROCEDURE — 36415 COLL VENOUS BLD VENIPUNCTURE: CPT

## 2019-02-07 PROCEDURE — 86803 HEPATITIS C AB TEST: CPT

## 2019-02-07 PROCEDURE — 84403 ASSAY OF TOTAL TESTOSTERONE: CPT

## 2019-02-07 PROCEDURE — 84153 ASSAY OF PSA TOTAL: CPT

## 2019-02-08 LAB
COMPLEXED PSA SERPL-MCNC: 1.8 NG/ML
HCV AB SERPL QL IA: NEGATIVE
TESTOST SERPL-MCNC: 501 NG/DL

## 2019-03-21 DIAGNOSIS — N40.0 BENIGN PROSTATIC HYPERPLASIA, UNSPECIFIED WHETHER LOWER URINARY TRACT SYMPTOMS PRESENT: ICD-10-CM

## 2019-03-21 DIAGNOSIS — N40.2 PROSTATE NODULE: Primary | ICD-10-CM

## 2019-05-06 ENCOUNTER — LAB VISIT (OUTPATIENT)
Dept: LAB | Facility: OTHER | Age: 74
End: 2019-05-06
Payer: COMMERCIAL

## 2019-05-06 DIAGNOSIS — N40.2 PROSTATE NODULE: ICD-10-CM

## 2019-05-06 DIAGNOSIS — N40.0 BENIGN PROSTATIC HYPERPLASIA, UNSPECIFIED WHETHER LOWER URINARY TRACT SYMPTOMS PRESENT: ICD-10-CM

## 2019-05-06 LAB — COMPLEXED PSA SERPL-MCNC: 2.6 NG/ML (ref 0–4)

## 2019-05-06 PROCEDURE — 84153 ASSAY OF PSA TOTAL: CPT

## 2019-05-06 PROCEDURE — 36415 COLL VENOUS BLD VENIPUNCTURE: CPT

## 2019-05-09 ENCOUNTER — OFFICE VISIT (OUTPATIENT)
Dept: UROLOGY | Facility: CLINIC | Age: 74
End: 2019-05-09
Payer: COMMERCIAL

## 2019-05-09 VITALS
HEIGHT: 67 IN | HEART RATE: 78 BPM | SYSTOLIC BLOOD PRESSURE: 111 MMHG | BODY MASS INDEX: 25.9 KG/M2 | RESPIRATION RATE: 15 BRPM | DIASTOLIC BLOOD PRESSURE: 68 MMHG | WEIGHT: 165 LBS

## 2019-05-09 DIAGNOSIS — N40.2 PROSTATE NODULE: ICD-10-CM

## 2019-05-09 DIAGNOSIS — N40.1 BENIGN PROSTATIC HYPERPLASIA WITH URINARY OBSTRUCTION: Primary | ICD-10-CM

## 2019-05-09 DIAGNOSIS — N13.8 BENIGN PROSTATIC HYPERPLASIA WITH URINARY OBSTRUCTION: Primary | ICD-10-CM

## 2019-05-09 PROCEDURE — 3074F SYST BP LT 130 MM HG: CPT | Mod: CPTII,S$GLB,, | Performed by: UROLOGY

## 2019-05-09 PROCEDURE — 99214 PR OFFICE/OUTPT VISIT, EST, LEVL IV, 30-39 MIN: ICD-10-PCS | Mod: S$GLB,,, | Performed by: UROLOGY

## 2019-05-09 PROCEDURE — 1101F PR PT FALLS ASSESS DOC 0-1 FALLS W/OUT INJ PAST YR: ICD-10-PCS | Mod: CPTII,S$GLB,, | Performed by: UROLOGY

## 2019-05-09 PROCEDURE — 99999 PR PBB SHADOW E&M-EST. PATIENT-LVL III: CPT | Mod: PBBFAC,,, | Performed by: UROLOGY

## 2019-05-09 PROCEDURE — 99999 PR PBB SHADOW E&M-EST. PATIENT-LVL III: ICD-10-PCS | Mod: PBBFAC,,, | Performed by: UROLOGY

## 2019-05-09 PROCEDURE — 3074F PR MOST RECENT SYSTOLIC BLOOD PRESSURE < 130 MM HG: ICD-10-PCS | Mod: CPTII,S$GLB,, | Performed by: UROLOGY

## 2019-05-09 PROCEDURE — 3078F DIAST BP <80 MM HG: CPT | Mod: CPTII,S$GLB,, | Performed by: UROLOGY

## 2019-05-09 PROCEDURE — 3078F PR MOST RECENT DIASTOLIC BLOOD PRESSURE < 80 MM HG: ICD-10-PCS | Mod: CPTII,S$GLB,, | Performed by: UROLOGY

## 2019-05-09 PROCEDURE — 1101F PT FALLS ASSESS-DOCD LE1/YR: CPT | Mod: CPTII,S$GLB,, | Performed by: UROLOGY

## 2019-05-09 PROCEDURE — 99214 OFFICE O/P EST MOD 30 MIN: CPT | Mod: S$GLB,,, | Performed by: UROLOGY

## 2019-05-09 RX ORDER — TADALAFIL 20 MG/1
20 TABLET ORAL DAILY PRN
Qty: 10 TABLET | Refills: 11 | Status: SHIPPED | OUTPATIENT
Start: 2019-05-09 | End: 2020-07-13

## 2019-05-09 NOTE — PROGRESS NOTES
Clinic Note  5/9/2019      Subjective:         Chief Complaint:   CRISTOFER Wayne is a 73 y.o. male well known to me, here in followup.   He has had a history of prostate nodule and had a prostate biopsy back in 03/2008.   Some LUTS minimal bother except for post void dribbling.  erectile dysfunction an issue        Lab Results   Component Value Date    PSA 1.8 02/07/2019    PSA 2.3 06/04/2018    PSA 2.1 07/09/2015    PSA 1.9 02/10/2014    PSA 2.32 04/16/2013    PSA 1.86 05/15/2012    PSA 1.6 04/07/2011    PSA 1.6 03/10/2009    PSA 1.6 03/06/2008    PSA 1.5 10/26/2006    PSADIAG 2.6 05/06/2019    PSADIAG 2.1 01/03/2017      Past Medical History:   Diagnosis Date    Basal cell carcinoma     BPH (benign prostatic hyperplasia)     Colon polyp     Hyperlipidemia     Hypertension     Liver cyst 1/11/2016    Prostate nodule      Family History   Problem Relation Age of Onset    Cancer Mother         melanoma    Cancer Father         skin cancer    Celiac disease Neg Hx     Cirrhosis Neg Hx     Colon cancer Neg Hx     Colon polyps Neg Hx     Crohn's disease Neg Hx     Esophageal cancer Neg Hx     Inflammatory bowel disease Neg Hx     Irritable bowel syndrome Neg Hx     Liver cancer Neg Hx     Rectal cancer Neg Hx     Stomach cancer Neg Hx     Ulcerative colitis Neg Hx      Social History     Socioeconomic History    Marital status:      Spouse name: Not on file    Number of children: Not on file    Years of education: Not on file    Highest education level: Not on file   Occupational History    Not on file   Social Needs    Financial resource strain: Not on file    Food insecurity:     Worry: Not on file     Inability: Not on file    Transportation needs:     Medical: Not on file     Non-medical: Not on file   Tobacco Use    Smoking status: Former Smoker     Packs/day: 0.50     Years: 4.00     Pack years: 2.00     Types: Cigarettes     Last attempt to quit: 5/18/1966     Years since  quittin.0    Smokeless tobacco: Never Used    Tobacco comment: as teenager   Substance and Sexual Activity    Alcohol use: Yes     Alcohol/week: 8.4 oz     Types: 14 Glasses of wine per week     Comment: social    Drug use: No    Sexual activity: Yes     Partners: Female   Lifestyle    Physical activity:     Days per week: Not on file     Minutes per session: Not on file    Stress: Not on file   Relationships    Social connections:     Talks on phone: Not on file     Gets together: Not on file     Attends Nondenominational service: Not on file     Active member of club or organization: Not on file     Attends meetings of clubs or organizations: Not on file     Relationship status: Not on file   Other Topics Concern    Not on file   Social History Narrative    Not on file     Past Surgical History:   Procedure Laterality Date    APPENDECTOMY      COLONOSCOPY N/A 2016    Performed by Marco Mathews MD at [facility] (4TH FLR)    COLONOSCOPY W/ POLYPECTOMY      ESOPHAGOGASTRODUODENOSCOPY      ESOPHAGOGASTRODUODENOSCOPY (EGD) N/A 2016    Performed by Jeremy Saleem MD at [facility] (2ND FLR)    HERNIA REPAIR      KNEE ARTHROSCOPY W/ DEBRIDEMENT      TONSILLECTOMY, ADENOIDECTOMY      ULTRASOUND-ENDOSCOPIC-UPPER N/A 2016    [facility] (2ND F[facility])    ULTRASOUND-ENDOSCOPIC-UPPER N/A 2016    Performed by Jeremy [facility]em MD at [facility] (2ND FLR)    UPPER ENDOSCOPIC ULTRASOUND W/ FNA      VASECTOMY       Patient Active Problem List   Diagnosis    HTN (hypertension)    High cholesterol    Prostate nodule    BPH (benign prostatic hypertrophy) with urinary obstruction    Chronic diarrhea    Bilateral chronic knee pain    Diarrhea    Esophageal dysphagia    Liver cyst    Elevated lipase    Screening for colorectal cancer    Diverticulitis large intestine w/o perforation or abscess w/o bleeding     Review of Systems   Constitutional: Negative for  "appetite change, chills, fatigue, fever and unexpected weight change.   HENT: Negative for nosebleeds.    Respiratory: Negative for shortness of breath and wheezing.    Cardiovascular: Negative for chest pain, palpitations and leg swelling.   Gastrointestinal: Negative for abdominal distention, abdominal pain, constipation, diarrhea, nausea and vomiting.   Genitourinary: Positive for nocturia and urgency. Negative for dysuria and hematuria.   Musculoskeletal: Negative for arthralgias and back pain.   Skin: Negative for pallor.   Neurological: Negative for dizziness, seizures and syncope.   Hematological: Negative for adenopathy.   Psychiatric/Behavioral: Negative for dysphoric mood.         Objective:      /68   Pulse 78   Resp 15   Ht 5' 7" (1.702 m)   Wt 74.8 kg (165 lb)   BMI 25.84 kg/m²   Estimated body mass index is 25.84 kg/m² as calculated from the following:    Height as of this encounter: 5' 7" (1.702 m).    Weight as of this encounter: 74.8 kg (165 lb).  Physical Exam   Constitutional: He is oriented to person, place, and time. He appears well-developed and well-nourished. No distress.   HENT:   Head: Atraumatic.   Neck: No tracheal deviation present.   Cardiovascular: Normal rate.    Pulmonary/Chest: Effort normal. No respiratory distress. He has no wheezes.   Abdominal: Soft. Bowel sounds are normal. He exhibits no distension and no mass. There is no tenderness. There is no rebound and no guarding.   Genitourinary:       Neurological: He is alert and oriented to person, place, and time.   Skin: Skin is warm and dry. He is not diaphoretic.     Psychiatric: He has a normal mood and affect. His behavior is normal. Judgment and thought content normal.         Assessment and Plan:           Problem List Items Addressed This Visit     None          Follow up:     1 year with JOSE MANUEL Stover.  Toñito Herman"

## 2019-06-06 ENCOUNTER — LAB VISIT (OUTPATIENT)
Dept: LAB | Facility: OTHER | Age: 74
End: 2019-06-06
Attending: INTERNAL MEDICINE
Payer: COMMERCIAL

## 2019-06-06 DIAGNOSIS — R19.7 DIARRHEA, UNSPECIFIED TYPE: ICD-10-CM

## 2019-06-06 LAB
C DIFF GDH STL QL: NEGATIVE
C DIFF TOX A+B STL QL IA: NEGATIVE

## 2019-06-06 PROCEDURE — 87209 SMEAR COMPLEX STAIN: CPT

## 2019-06-06 PROCEDURE — 87045 FECES CULTURE AEROBIC BACT: CPT

## 2019-06-06 PROCEDURE — 87329 GIARDIA AG IA: CPT

## 2019-06-06 PROCEDURE — 87046 STOOL CULTR AEROBIC BACT EA: CPT

## 2019-06-06 PROCEDURE — 87427 SHIGA-LIKE TOXIN AG IA: CPT | Mod: 59

## 2019-06-06 PROCEDURE — 87449 NOS EACH ORGANISM AG IA: CPT

## 2019-06-07 LAB
CRYPTOSP AG STL QL IA: NEGATIVE
E COLI SXT1 STL QL IA: NEGATIVE
E COLI SXT2 STL QL IA: NEGATIVE
G LAMBLIA AG STL QL IA: NEGATIVE
O+P STL TRI STN: NORMAL

## 2019-06-09 ENCOUNTER — TELEPHONE (OUTPATIENT)
Dept: GASTROENTEROLOGY | Facility: CLINIC | Age: 74
End: 2019-06-09

## 2019-06-09 ENCOUNTER — DOCUMENTATION ONLY (OUTPATIENT)
Dept: GASTROENTEROLOGY | Facility: CLINIC | Age: 74
End: 2019-06-09

## 2019-06-09 NOTE — PROGRESS NOTES
Claudia and Dr. Leo Sanchez - please schedule Alejandro for upper endoscopy and EUS of the pancreas for evaluation of his diarrhea history low fecal elastase nonspecific findings on his pancreatic EUS 3 years ago and anemia.  One EGD please rule out celiac sprue and H pylori with biopsies of the stomach and small bowel.    Dashion please order lab work fasting this week orders placed in stool studies please give patient I had for his toilet to collect the stools and 2-3 stool containers.    Alejandro remember 1 stool has to be a solid stool the other 1 can be whatever you have.    Marco,   Hope you are doing great.  If you get a few minutes in the next few days I would appreciate it if you could give me a call.  I am still having bowel movement issues.  Mark Sheppard has been involved and ordered the text below.  My cell is [phone].  Many thanks,  Alejandro  Sent from my iPad   Alejandro Wayne    [employer].  [facility] [address] [address]  [phone]

## 2019-06-10 ENCOUNTER — TELEPHONE (OUTPATIENT)
Dept: GASTROENTEROLOGY | Facility: CLINIC | Age: 74
End: 2019-06-10

## 2019-06-10 ENCOUNTER — LAB VISIT (OUTPATIENT)
Dept: LAB | Facility: HOSPITAL | Age: 74
End: 2019-06-10
Payer: COMMERCIAL

## 2019-06-10 DIAGNOSIS — D50.9 IRON DEFICIENCY ANEMIA, UNSPECIFIED IRON DEFICIENCY ANEMIA TYPE: ICD-10-CM

## 2019-06-10 DIAGNOSIS — R19.7 DIARRHEA, UNSPECIFIED TYPE: ICD-10-CM

## 2019-06-10 LAB
BACTERIA STL CULT: NORMAL
C DIFF GDH STL QL: NEGATIVE
C DIFF TOX A+B STL QL IA: NEGATIVE

## 2019-06-10 PROCEDURE — 87209 SMEAR COMPLEX STAIN: CPT

## 2019-06-10 PROCEDURE — 87449 NOS EACH ORGANISM AG IA: CPT

## 2019-06-10 NOTE — TELEPHONE ENCOUNTER
Ma spoke to pt scheduled fasting lab per Dr. Mathews     Pt verbalize understanding, confirmed lab appt and thank Ma     ----- Message from Aparna Fuller MA sent at 6/10/2019  9:31 AM CDT -----  Contact: Pt: [phone]  Jewel Melgar, did you call  this AM?  If not, is this something I need to take care of?  Yumi  ----- Message -----  From: Mary Grace Keys  Sent: 6/10/2019   9:28 AM  To: Bisi SEBASTIAN Staff    Patient Returning Call from [facility]    Who Left Message for Patient: Ramón     Communication Preference: Pt: [phone]

## 2019-06-10 NOTE — TELEPHONE ENCOUNTER
Ma contact pt to inform pt of Dr. Mathews message - Dashion please order lab work fasting this week orders placed in stool studies please give patient I had for his toilet to collect the stools and 2-3 stool containers.     Pt didn't answer left detail message to return call    Send me gabapentin and refer to neurology

## 2019-06-11 ENCOUNTER — LAB VISIT (OUTPATIENT)
Dept: LAB | Facility: HOSPITAL | Age: 74
End: 2019-06-11
Attending: INTERNAL MEDICINE
Payer: COMMERCIAL

## 2019-06-11 DIAGNOSIS — D50.9 IRON DEFICIENCY ANEMIA, UNSPECIFIED IRON DEFICIENCY ANEMIA TYPE: ICD-10-CM

## 2019-06-11 DIAGNOSIS — R19.7 DIARRHEA, UNSPECIFIED TYPE: ICD-10-CM

## 2019-06-11 LAB
ALBUMIN SERPL BCP-MCNC: 3.9 G/DL (ref 3.5–5.2)
ALP SERPL-CCNC: 69 U/L (ref 55–135)
ALT SERPL W/O P-5'-P-CCNC: 23 U/L (ref 10–44)
ANION GAP SERPL CALC-SCNC: 9 MMOL/L (ref 8–16)
AST SERPL-CCNC: 24 U/L (ref 10–40)
BASOPHILS # BLD AUTO: 0.11 K/UL (ref 0–0.2)
BASOPHILS NFR BLD: 1.6 % (ref 0–1.9)
BILIRUB SERPL-MCNC: 0.5 MG/DL (ref 0.1–1)
BUN SERPL-MCNC: 12 MG/DL (ref 8–23)
CALCIUM SERPL-MCNC: 9.4 MG/DL (ref 8.7–10.5)
CHLORIDE SERPL-SCNC: 105 MMOL/L (ref 95–110)
CO2 SERPL-SCNC: 25 MMOL/L (ref 23–29)
CREAT SERPL-MCNC: 0.7 MG/DL (ref 0.5–1.4)
DIFFERENTIAL METHOD: ABNORMAL
EOSINOPHIL # BLD AUTO: 0.3 K/UL (ref 0–0.5)
EOSINOPHIL NFR BLD: 4.1 % (ref 0–8)
ERYTHROCYTE [DISTWIDTH] IN BLOOD BY AUTOMATED COUNT: 13.2 % (ref 11.5–14.5)
EST. GFR  (AFRICAN AMERICAN): >60 ML/MIN/1.73 M^2
EST. GFR  (NON AFRICAN AMERICAN): >60 ML/MIN/1.73 M^2
FERRITIN SERPL-MCNC: 131 NG/ML (ref 20–300)
GLUCOSE SERPL-MCNC: 107 MG/DL (ref 70–110)
HCT VFR BLD AUTO: 42.6 % (ref 40–54)
HGB BLD-MCNC: 13.8 G/DL (ref 14–18)
IGA SERPL-MCNC: 230 MG/DL (ref 40–350)
IMM GRANULOCYTES # BLD AUTO: 0.04 K/UL (ref 0–0.04)
IMM GRANULOCYTES NFR BLD AUTO: 0.6 % (ref 0–0.5)
IRON SERPL-MCNC: 76 UG/DL (ref 45–160)
LYMPHOCYTES # BLD AUTO: 1.2 K/UL (ref 1–4.8)
LYMPHOCYTES NFR BLD: 17 % (ref 18–48)
MCH RBC QN AUTO: 29.5 PG (ref 27–31)
MCHC RBC AUTO-ENTMCNC: 32.4 G/DL (ref 32–36)
MCV RBC AUTO: 91 FL (ref 82–98)
MONOCYTES # BLD AUTO: 0.7 K/UL (ref 0.3–1)
MONOCYTES NFR BLD: 10.4 % (ref 4–15)
NEUTROPHILS # BLD AUTO: 4.5 K/UL (ref 1.8–7.7)
NEUTROPHILS NFR BLD: 66.3 % (ref 38–73)
NRBC BLD-RTO: 0 /100 WBC
O+P STL TRI STN: NORMAL
PLATELET # BLD AUTO: 277 K/UL (ref 150–350)
PMV BLD AUTO: 10 FL (ref 9.2–12.9)
POTASSIUM SERPL-SCNC: 3.9 MMOL/L (ref 3.5–5.1)
PROT SERPL-MCNC: 7.4 G/DL (ref 6–8.4)
RBC # BLD AUTO: 4.68 M/UL (ref 4.6–6.2)
SATURATED IRON: 22 % (ref 20–50)
SODIUM SERPL-SCNC: 139 MMOL/L (ref 136–145)
TOTAL IRON BINDING CAPACITY: 343 UG/DL (ref 250–450)
TRANSFERRIN SERPL-MCNC: 232 MG/DL (ref 200–375)
TSH SERPL DL<=0.005 MIU/L-ACNC: 2.79 UIU/ML (ref 0.4–4)
WBC # BLD AUTO: 6.83 K/UL (ref 3.9–12.7)

## 2019-06-11 PROCEDURE — 80053 COMPREHEN METABOLIC PANEL: CPT

## 2019-06-11 PROCEDURE — 82728 ASSAY OF FERRITIN: CPT

## 2019-06-11 PROCEDURE — 84443 ASSAY THYROID STIM HORMONE: CPT

## 2019-06-11 PROCEDURE — 83516 IMMUNOASSAY NONANTIBODY: CPT

## 2019-06-11 PROCEDURE — 85025 COMPLETE CBC W/AUTO DIFF WBC: CPT

## 2019-06-11 PROCEDURE — 83540 ASSAY OF IRON: CPT

## 2019-06-11 PROCEDURE — 82784 ASSAY IGA/IGD/IGG/IGM EACH: CPT

## 2019-06-12 ENCOUNTER — TELEPHONE (OUTPATIENT)
Dept: ENDOSCOPY | Facility: HOSPITAL | Age: 74
End: 2019-06-12

## 2019-06-12 LAB — TTG IGA SER-ACNC: 8 UNITS

## 2019-06-12 NOTE — TELEPHONE ENCOUNTER
Attempted to contact patient regarding EGD/EUS on 6/24/19 at 0900. Left message on voice mail. Need to reviewed medical history/medications and procedural instructions.

## 2019-06-13 ENCOUNTER — TELEPHONE (OUTPATIENT)
Dept: ENDOSCOPY | Facility: HOSPITAL | Age: 74
End: 2019-06-13

## 2019-06-13 NOTE — TELEPHONE ENCOUNTER
Spoke to pt, medical history/medications reviewed, prep instructions mailed out and also [facility] pt request.

## 2019-06-13 NOTE — TELEPHONE ENCOUNTER
Left message for pt to return call, he is being scheduled for an EGD/EUS on 6/24/9 at 9:00 am, need to review medical history/medications and mail prep instructions out.

## 2019-06-18 DIAGNOSIS — K86.81 EXOCRINE PANCREATIC INSUFFICIENCY: Primary | ICD-10-CM

## 2019-06-24 ENCOUNTER — TELEPHONE (OUTPATIENT)
Dept: GASTROENTEROLOGY | Facility: CLINIC | Age: 74
End: 2019-06-24

## 2019-06-24 ENCOUNTER — ANESTHESIA (OUTPATIENT)
Dept: ENDOSCOPY | Facility: HOSPITAL | Age: 74
End: 2019-06-24
Payer: COMMERCIAL

## 2019-06-24 ENCOUNTER — HOSPITAL ENCOUNTER (OUTPATIENT)
Facility: HOSPITAL | Age: 74
Discharge: HOME | End: 2019-06-24
Attending: INTERNAL MEDICINE | Admitting: INTERNAL MEDICINE
Payer: COMMERCIAL

## 2019-06-24 ENCOUNTER — ANESTHESIA EVENT (OUTPATIENT)
Dept: ENDOSCOPY | Facility: HOSPITAL | Age: 74
End: 2019-06-24
Payer: COMMERCIAL

## 2019-06-24 ENCOUNTER — DOCUMENTATION ONLY (OUTPATIENT)
Dept: GASTROENTEROLOGY | Facility: CLINIC | Age: 74
End: 2019-06-24

## 2019-06-24 ENCOUNTER — PATIENT MESSAGE (OUTPATIENT)
Dept: GASTROENTEROLOGY | Facility: CLINIC | Age: 74
End: 2019-06-24

## 2019-06-24 VITALS
RESPIRATION RATE: 18 BRPM | BODY MASS INDEX: 25.9 KG/M2 | HEIGHT: 67 IN | HEART RATE: 64 BPM | TEMPERATURE: 98 F | WEIGHT: 165 LBS | OXYGEN SATURATION: 98 % | DIASTOLIC BLOOD PRESSURE: 70 MMHG | SYSTOLIC BLOOD PRESSURE: 124 MMHG

## 2019-06-24 DIAGNOSIS — R19.4 CHANGE IN BOWEL HABITS: Primary | ICD-10-CM

## 2019-06-24 DIAGNOSIS — K76.89 LIVER CYST: Primary | ICD-10-CM

## 2019-06-24 DIAGNOSIS — K86.89 PANCREATIC INSUFFICIENCY: ICD-10-CM

## 2019-06-24 DIAGNOSIS — K52.9 CHRONIC DIARRHEA: Primary | ICD-10-CM

## 2019-06-24 DIAGNOSIS — R19.7 DIARRHEA, UNSPECIFIED TYPE: ICD-10-CM

## 2019-06-24 PROCEDURE — 88305 TISSUE SPECIMEN TO PATHOLOGY - SURGERY: ICD-10-PCS | Mod: 26,,, | Performed by: PATHOLOGY

## 2019-06-24 PROCEDURE — 25000003 PHARM REV CODE 250: Performed by: NURSE ANESTHETIST, CERTIFIED REGISTERED

## 2019-06-24 PROCEDURE — D9220A PRA ANESTHESIA: ICD-10-PCS | Mod: CRNA,,, | Performed by: NURSE ANESTHETIST, CERTIFIED REGISTERED

## 2019-06-24 PROCEDURE — 43259 EGD US EXAM DUODENUM/JEJUNUM: CPT | Mod: ,,, | Performed by: INTERNAL MEDICINE

## 2019-06-24 PROCEDURE — 43239 EGD BIOPSY SINGLE/MULTIPLE: CPT | Performed by: INTERNAL MEDICINE

## 2019-06-24 PROCEDURE — 37000009 HC ANESTHESIA EA ADD 15 MINS: Performed by: INTERNAL MEDICINE

## 2019-06-24 PROCEDURE — 43259 PR ENDOSCOPIC ULTRASOUND EXAM: ICD-10-PCS | Mod: ,,, | Performed by: INTERNAL MEDICINE

## 2019-06-24 PROCEDURE — 27201012 HC FORCEPS, HOT/COLD, DISP: Performed by: INTERNAL MEDICINE

## 2019-06-24 PROCEDURE — 88305 TISSUE EXAM BY PATHOLOGIST: CPT | Performed by: PATHOLOGY

## 2019-06-24 PROCEDURE — 43259 EGD US EXAM DUODENUM/JEJUNUM: CPT | Performed by: INTERNAL MEDICINE

## 2019-06-24 PROCEDURE — 43239 EGD BIOPSY SINGLE/MULTIPLE: CPT | Mod: 59,,, | Performed by: INTERNAL MEDICINE

## 2019-06-24 PROCEDURE — 43239 PR EGD, FLEX, W/BIOPSY, SGL/MULTI: ICD-10-PCS | Mod: 59,,, | Performed by: INTERNAL MEDICINE

## 2019-06-24 PROCEDURE — D9220A PRA ANESTHESIA: Mod: CRNA,,, | Performed by: NURSE ANESTHETIST, CERTIFIED REGISTERED

## 2019-06-24 PROCEDURE — D9220A PRA ANESTHESIA: ICD-10-PCS | Mod: ANES,,, | Performed by: ANESTHESIOLOGY

## 2019-06-24 PROCEDURE — D9220A PRA ANESTHESIA: Mod: ANES,,, | Performed by: ANESTHESIOLOGY

## 2019-06-24 PROCEDURE — 88305 TISSUE EXAM BY PATHOLOGIST: CPT | Mod: 26,,, | Performed by: PATHOLOGY

## 2019-06-24 PROCEDURE — 63600175 PHARM REV CODE 636 W HCPCS: Performed by: NURSE ANESTHETIST, CERTIFIED REGISTERED

## 2019-06-24 PROCEDURE — 37000008 HC ANESTHESIA 1ST 15 MINUTES: Performed by: INTERNAL MEDICINE

## 2019-06-24 RX ORDER — SODIUM CHLORIDE 9 MG/ML
INJECTION, SOLUTION INTRAVENOUS CONTINUOUS PRN
Status: DISCONTINUED | OUTPATIENT
Start: 2019-06-24 | End: 2019-06-24

## 2019-06-24 RX ORDER — MIDAZOLAM HYDROCHLORIDE 1 MG/ML
INJECTION, SOLUTION INTRAMUSCULAR; INTRAVENOUS
Status: DISCONTINUED | OUTPATIENT
Start: 2019-06-24 | End: 2019-06-24

## 2019-06-24 RX ORDER — FENTANYL CITRATE 50 UG/ML
INJECTION, SOLUTION INTRAMUSCULAR; INTRAVENOUS
Status: DISCONTINUED | OUTPATIENT
Start: 2019-06-24 | End: 2019-06-24

## 2019-06-24 RX ORDER — LIDOCAINE HCL/PF 100 MG/5ML
SYRINGE (ML) INTRAVENOUS
Status: DISCONTINUED | OUTPATIENT
Start: 2019-06-24 | End: 2019-06-24

## 2019-06-24 RX ORDER — SODIUM CHLORIDE 0.9 % (FLUSH) 0.9 %
10 SYRINGE (ML) INJECTION
Status: DISCONTINUED | OUTPATIENT
Start: 2019-06-24 | End: 2019-06-24 | Stop reason: HOSPADM

## 2019-06-24 RX ORDER — OXYCODONE HYDROCHLORIDE 5 MG/1
5 TABLET ORAL
Status: DISCONTINUED | OUTPATIENT
Start: 2019-06-24 | End: 2019-06-24 | Stop reason: HOSPADM

## 2019-06-24 RX ORDER — PROPOFOL 10 MG/ML
VIAL (ML) INTRAVENOUS
Status: DISCONTINUED | OUTPATIENT
Start: 2019-06-24 | End: 2019-06-24

## 2019-06-24 RX ORDER — PROPOFOL 10 MG/ML
VIAL (ML) INTRAVENOUS CONTINUOUS PRN
Status: DISCONTINUED | OUTPATIENT
Start: 2019-06-24 | End: 2019-06-24

## 2019-06-24 RX ORDER — SODIUM CHLORIDE 9 MG/ML
INJECTION, SOLUTION INTRAVENOUS CONTINUOUS
Status: DISCONTINUED | OUTPATIENT
Start: 2019-06-24 | End: 2019-06-24 | Stop reason: HOSPADM

## 2019-06-24 RX ORDER — ONDANSETRON 2 MG/ML
INJECTION INTRAMUSCULAR; INTRAVENOUS
Status: DISCONTINUED | OUTPATIENT
Start: 2019-06-24 | End: 2019-06-24

## 2019-06-24 RX ORDER — FENTANYL CITRATE 50 UG/ML
25 INJECTION, SOLUTION INTRAMUSCULAR; INTRAVENOUS EVERY 5 MIN PRN
Status: DISCONTINUED | OUTPATIENT
Start: 2019-06-24 | End: 2019-06-24 | Stop reason: HOSPADM

## 2019-06-24 RX ADMIN — ONDANSETRON 4 MG: 2 INJECTION INTRAMUSCULAR; INTRAVENOUS at 09:06

## 2019-06-24 RX ADMIN — PROPOFOL 150 MCG/KG/MIN: 10 INJECTION, EMULSION INTRAVENOUS at 09:06

## 2019-06-24 RX ADMIN — SODIUM CHLORIDE: 0.9 INJECTION, SOLUTION INTRAVENOUS at 09:06

## 2019-06-24 RX ADMIN — LIDOCAINE HYDROCHLORIDE 75 MG: 20 INJECTION, SOLUTION INTRAVENOUS at 09:06

## 2019-06-24 RX ADMIN — FENTANYL CITRATE 25 MCG: 50 INJECTION, SOLUTION INTRAMUSCULAR; INTRAVENOUS at 09:06

## 2019-06-24 RX ADMIN — PROPOFOL 60 MG: 10 INJECTION, EMULSION INTRAVENOUS at 09:06

## 2019-06-24 RX ADMIN — MIDAZOLAM HYDROCHLORIDE 2 MG: 1 INJECTION, SOLUTION INTRAMUSCULAR; INTRAVENOUS at 09:06

## 2019-06-24 NOTE — H&P
Short Stay Endoscopy History and Physical    PCP - Edward Sheppard MD  Referring Physician - Marco Mathews MD  [address] [place] [address]    Procedure - EGD/EUS  ASA - per anesthesia  Mallampati - per anesthesia  History of Anesthesia problems - no  Family history Anesthesia problems -  no   Plan of anesthesia - General    HPI:  This is a 74 y.o. male here for evaluation of: pancreatic insufficiency    Reflux - no  Dysphagia - no  Abdominal pain - no  Diarrhea - POS    ROS:  Constitutional: No fevers, chills, No weight loss  CV: No chest pain  Pulm: No cough, No shortness of breath  GI: see HPI    Medical History:  has a past medical history of Basal cell carcinoma, BPH (benign prostatic hyperplasia), Colon polyp, Hyperlipidemia, Hypertension, Liver cyst (1/11/2016), and Prostate nodule.    Surgical History:  has a past surgical history that includes Appendectomy; Knee arthroscopy w/ debridement; Hernia repair; TONSILLECTOMY, ADENOIDECTOMY; Vasectomy; Upper endoscopic ultrasound w/ FNA; Esophagogastroduodenoscopy; Colonoscopy w/ polypectomy; and Colonoscopy (N/A, 6/22/2016).    Family History: family history includes Cancer in his father and mother..    Social History:  reports that he quit smoking about 53 years ago. His smoking use included cigarettes. He has a 2.00 pack-year smoking history. He has never used smokeless tobacco. He reports that he drinks about 8.4 oz of alcohol per week. He reports that he does not use drugs.    Review of patient's allergies indicates:   Allergen Reactions    Meloxicam      iarrhea       Medications:   Medications Prior to Admission   Medication Sig Dispense Refill Last Dose    amLODIPine (NORVASC) 5 MG tablet Take 1 tablet (5 mg total) by mouth once daily. 90 tablet 3 6/23/2019 at Unknown time    ascorbic acid (VITAMIN C) 1000 MG tablet Take 1,000 mg by mouth once daily.   6/23/2019 at Unknown time    aspirin (ECOTRIN) 81 MG EC tablet Take 81 mg by  mouth once daily.   6/23/2019 at Unknown time    cetirizine (ZYRTEC) 10 MG tablet Take 10 mg by mouth once daily.   6/23/2019 at Unknown time    eszopiclone (LUNESTA) 2 MG Tab TAKE 1 TABLET BY MOUTH EVERY NIGHT AT BEDTIME AS NEEDED FOR SLEEP 30 tablet 0 Past Week at Unknown time    lipase-protease-amylase (CREON) 36,000-114,000- 180,000 unit CpDR Take 3 capsules by mouth 3 (three) times daily with meals. One capsule with every snack.  Not to exceed 12 pills a day. 810 capsule 3 6/23/2019 at Unknown time    multivitamin (MULTIVITAMIN) per tablet Take 1 tablet by mouth once daily.   6/23/2019 at Unknown time    rosuvastatin (CRESTOR) 20 MG tablet Take 1 tablet (20 mg total) by mouth once daily. 90 tablet 3 6/23/2019 at Unknown time    vitamin E 1000 UNIT capsule Take 1,000 Units by mouth once daily.   6/23/2019 at Unknown time    ciprofloxacin HCl (CIPRO) 500 MG tablet 20TK 1 T PO  BID 20 tablet 1 Unknown at Unknown time    diphenoxylate-atropine 2.5-0.025 mg (LOMOTIL) 2.5-0.025 mg per tablet 1-2 pills qid prn diarrhea 50 tablet 0 Unknown at Unknown time    tadalafil (CIALIS) 20 MG Tab Take 1 tablet (20 mg total) by mouth daily as needed. 10 tablet 11        Physical Exam:    Vital Signs:   Vitals:    06/24/19 0832   BP: 129/75   Pulse: 75   Resp: 16   Temp: 98.8 °F (37.1 °C)       General Appearance: Well appearing in no acute distress  Eyes:    No scleral icterus  ENT: Neck supple  Lungs: CTA anteriorly  Heart:  Regular  Abdomen: Soft, non tender, non distended with normal bowel sounds.    Labs:  Lab Results   Component Value Date    WBC 6.83 06/11/2019    HGB 13.8 (L) 06/11/2019    HCT 42.6 06/11/2019     06/11/2019    CHOL 136 06/04/2018    TRIG 52 06/04/2018    HDL 58 06/04/2018    ALT 23 06/11/2019    AST 24 06/11/2019     06/11/2019    K 3.9 06/11/2019     06/11/2019    CREATININE 0.7 06/11/2019    BUN 12 06/11/2019    CO2 25 06/11/2019    TSH 2.786 06/11/2019    PSA 1.8 02/07/2019     HGBA1C 5.5 02/10/2014       I have explained the risks and benefits of this endoscopic procedure to the patient including but not limited to bleeding, inflammation, infection, perforation, and death.      Jeremy Saleem MD

## 2019-06-24 NOTE — ANESTHESIA POSTPROCEDURE EVALUATION
Anesthesia Post Evaluation    Patient: Alejandro Wayne    Procedure(s) Performed: Procedure(s) (LRB):  EGD (ESOPHAGOGASTRODUODENOSCOPY) (N/A)  ULTRASOUND, UPPER GI TRACT, ENDOSCOPIC (N/A)    Final Anesthesia Type: general  Patient location during evaluation: PACU  Patient participation: Yes- Able to Participate  Level of consciousness: awake and alert  Post-procedure vital signs: reviewed and stable  Pain management: adequate  Airway patency: patent  PONV status at discharge: No PONV  Anesthetic complications: no      Cardiovascular status: blood pressure returned to baseline  Respiratory status: unassisted  Hydration status: euvolemic  Follow-up not needed.          Vitals Value Taken Time   /66 6/24/2019 10:02 AM   Temp 36.5 °C (97.7 °F) 6/24/2019  9:43 AM   Pulse 69 6/24/2019 10:13 AM   Resp 20 6/24/2019 10:13 AM   SpO2 98 % 6/24/2019 10:13 AM   Vitals shown include unvalidated device data.      No case tracking events are documented in the log.      Pain/Chetan Score: Chetan Score: 10 (6/24/2019 10:09 AM)

## 2019-06-24 NOTE — TRANSFER OF CARE
"Anesthesia Transfer of Care Note    Patient: Alejandro Wayne    Procedure(s) Performed: Procedure(s) (LRB):  EGD (ESOPHAGOGASTRODUODENOSCOPY) (N/A)  ULTRASOUND, UPPER GI TRACT, ENDOSCOPIC (N/A)    Patient location: PACU    Anesthesia Type: general    Transport from OR: Transported from OR on 2-3 L/min O2 by NC with adequate spontaneous ventilation    Post pain: adequate analgesia    Post assessment: no apparent anesthetic complications and tolerated procedure well    Post vital signs: stable    Level of consciousness: sedated    Nausea/Vomiting: no nausea/vomiting    Complications: none    Transfer of care protocol was followed      Last vitals:   Visit Vitals  /75   Pulse 75   Temp 37.1 °C (98.8 °F)   Resp 16   Ht 5' 7" (1.702 m)   Wt 74.8 kg (165 lb)   SpO2 95%   BMI 25.84 kg/m²     "

## 2019-06-24 NOTE — ANESTHESIA PREPROCEDURE EVALUATION
06/24/2019  Alejandro Wayne is a 74 y.o., male for upper endoscopy and EUS of the pancreas for evaluation of his diarrhea history low fecal elastase nonspecific findings on his pancreatic EUS 3 years ago and anemia.  One EGD please rule out celiac sprue and H pylori with biopsies of the stomach and small bowel.  Pre-operative evaluation for Procedure(s) (LRB):  EGD (ESOPHAGOGASTRODUODENOSCOPY) (N/A)  ULTRASOUND, UPPER GI TRACT, ENDOSCOPIC (N/A)    Alejandro Wayne is a 74 y.o. male       Active problems:  Patient Active Problem List    Diagnosis Date Noted    Diverticulitis large intestine w/o perforation or abscess w/o bleeding 07/27/2016    Screening for colorectal cancer 06/22/2016    Elevated lipase 05/18/2016    Liver cyst 01/11/2016    Diarrhea 11/24/2015    Esophageal dysphagia 11/24/2015    Chronic diarrhea 11/23/2015    Bilateral chronic knee pain 11/23/2015    Benign prostatic hyperplasia with urinary obstruction 07/22/2014    HTN (hypertension) 04/16/2013    High cholesterol 04/16/2013    Prostate nodule 04/16/2013         Prev airway:       Review of patient's allergies indicates:   Allergen Reactions    Meloxicam      iarrhea        No current facility-administered medications on file prior to encounter.      Current Outpatient Medications on File Prior to Encounter   Medication Sig Dispense Refill    amLODIPine (NORVASC) 5 MG tablet Take 1 tablet (5 mg total) by mouth once daily. 90 tablet 3    ascorbic acid (VITAMIN C) 1000 MG tablet Take 1,000 mg by mouth once daily.      aspirin (ECOTRIN) 81 MG EC tablet Take 81 mg by mouth once daily.      cetirizine (ZYRTEC) 10 MG tablet Take 10 mg by mouth once daily.      ciprofloxacin HCl (CIPRO) 500 MG tablet 20TK 1 T PO  BID 20 tablet 1    diphenoxylate-atropine 2.5-0.025 mg (LOMOTIL) 2.5-0.025 mg per tablet 1-2 pills qid prn diarrhea 50  tablet 0    eszopiclone (LUNESTA) 2 MG Tab TAKE 1 TABLET BY MOUTH EVERY NIGHT AT BEDTIME AS NEEDED FOR SLEEP 30 tablet 0    multivitamin (MULTIVITAMIN) per tablet Take 1 tablet by mouth once daily.      rosuvastatin (CRESTOR) 20 MG tablet Take 1 tablet (20 mg total) by mouth once daily. 90 tablet 3    tadalafil (CIALIS) 20 MG Tab Take 1 tablet (20 mg total) by mouth daily as needed. 10 tablet 11    vitamin E 1000 UNIT capsule Take 1,000 Units by mouth once daily.         Past Surgical History:   Procedure Laterality Date    APPENDECTOMY      COLONOSCOPY N/A 2016    Performed by Marco [facility] at [facility] (4TH FLR)    COLONOSCOPY W/ POLYPECTOMY      ESOPHAGOGASTRODUODENOSCOPY      ESOPHAGOGASTRODUODENOSCOPY (EGD) N/A 2016    Performed by Jeremy Saleem MD at [facility] (2ND FLR)    HERNIA REPAIR      KNEE ARTHROSCOPY W/ DEBRIDEMENT      TONSILLECTOMY, ADENOIDECTOMY      ULTRASOUND-ENDOSCOPIC-UPPER N/A 2016    Performed by Jeremy Saleem MD at [facility] (2ND FLR)    ULTRASOUND-ENDOSCOPIC-UPPER N/A 2016    Performed by Jeremy [facility]em MD at [facility] (2ND FLR)    UPPER ENDOSCOPIC ULTRASOUND W/ FNA      VASECTOMY         Social History     Socioeconomic History    Marital status:      Spouse name: Not on file    Number of children: Not on file    Years of education: Not on file    Highest education level: Not on file   Occupational History    Not on file   Social Needs    Financial resource strain: Not on file    Food insecurity:     Worry: Not on file     Inability: Not on file    Transportation needs:     Medical: Not on file     Non-medical: Not on file   Tobacco Use    Smoking status: Former Smoker     Packs/day: 0.50     Years: 4.00     Pack years: 2.00     Types: Cigarettes     Last attempt to quit: 1966     Years since quittin.1    Smokeless tobacco: Never Used    Tobacco comment: as teenager   Substance and Sexual Activity    Alcohol use: Yes      Alcohol/week: 8.4 oz     Types: 14 Glasses of wine per week     Comment: social    Drug use: No    Sexual activity: Yes     Partners: Female   Lifestyle    Physical activity:     Days per week: Not on file     Minutes per session: Not on file    Stress: Not on file   Relationships    Social connections:     Talks on phone: Not on file     Gets together: Not on file     Attends Judaism service: Not on file     Active member of club or organization: Not on file     Attends meetings of clubs or organizations: Not on file     Relationship status: Not on file   Other Topics Concern    Not on file   Social History Narrative    Not on file         Vital Signs Range (Last 24H):  Wt Readings from Last 3 Encounters:   05/09/19 74.8 kg (165 lb)   02/20/19 79.8 kg (176 lb)   10/02/18 78.2 kg (172 lb 6.4 oz)     Temp Readings from Last 3 Encounters:   06/22/16 36.4 °C (97.6 °F)   05/18/16 36.5 °C (97.7 °F) (Oral)   03/01/16 36.8 °C (98.2 °F) (Oral)     BP Readings from Last 3 Encounters:   05/09/19 111/68   02/20/19 134/84   10/02/18 124/78     Pulse Readings from Last 3 Encounters:   05/09/19 78   02/20/19 68   10/02/18 67         CBC:   Lab Results   Component Value Date    WBC 6.83 06/11/2019    HGB 13.8 (L) 06/11/2019    HCT 42.6 06/11/2019    MCV 91 06/11/2019     06/11/2019       CMP: CMP  Sodium   Date Value Ref Range Status   06/11/2019 139 136 - 145 mmol/L Final     Potassium   Date Value Ref Range Status   06/11/2019 3.9 3.5 - 5.1 mmol/L Final     Chloride   Date Value Ref Range Status   06/11/2019 105 95 - 110 mmol/L Final     CO2   Date Value Ref Range Status   06/11/2019 25 23 - 29 mmol/L Final     Glucose   Date Value Ref Range Status   06/11/2019 107 70 - 110 mg/dL Final     BUN, Bld   Date Value Ref Range Status   06/11/2019 12 8 - 23 mg/dL Final     Creatinine   Date Value Ref Range Status   06/11/2019 0.7 0.5 - 1.4 mg/dL Final     Calcium   Date Value Ref Range Status   06/11/2019 9.4 8.7 -  10.5 mg/dL Final     Total Protein   Date Value Ref Range Status   2019 7.4 6.0 - 8.4 g/dL Final     Albumin   Date Value Ref Range Status   2019 3.9 3.5 - 5.2 g/dL Final     Total Bilirubin   Date Value Ref Range Status   2019 0.5 0.1 - 1.0 mg/dL Final     Comment:     For infants and newborns, interpretation of results should be based  on gestational age, weight and in agreement with clinical  observations.  Premature Infant recommended reference ranges:  Up to 24 hours.............<8.0 mg/dL  Up to 48 hours............<12.0 mg/dL  3-5 days..................<15.0 mg/dL  6-29 days.................<15.0 mg/dL       Alkaline Phosphatase   Date Value Ref Range Status   2019 69 55 - 135 U/L Final     AST   Date Value Ref Range Status   2019 24 10 - 40 U/L Final     ALT   Date Value Ref Range Status   2019 23 10 - 44 U/L Final     Anion Gap   Date Value Ref Range Status   2019 9 8 - 16 mmol/L Final     eGFR if    Date Value Ref Range Status   2019 >60.0 >60 mL/min/1.73 m^2 Final     eGFR if non    Date Value Ref Range Status   2019 >60.0 >60 mL/min/1.73 m^2 Final     Comment:     Calculation used to obtain the estimated glomerular filtration  rate (eGFR) is the CKD-EPI equation.          INR  No results found for: INR, PROTIME        Diagnostic Studies:      EKD Echo:          .    Anesthesia Evaluation         Review of Systems  Hematology/Oncology:         -- Anemia:   Cardiovascular:   Hypertension   Renal/:   BPH    Hepatic/GI:   Bowel Prep. GERD Liver Disease, Diarrhea hx  Liver cyst       Physical Exam  General:  Well nourished    Airway/Jaw/Neck:  Airway Findings: Mouth Opening: Normal Tongue: Normal  General Airway Assessment: Adult  Mallampati: I  Jaw/Neck Findings:  Neck ROM: Normal ROM      Dental:  Dental Findings: In tact   Chest/Lungs:  Chest/Lungs Findings: Clear to auscultation     Heart/Vascular:  Heart  Findings: Rate: Normal  Rhythm: Regular Rhythm  Sounds: Normal        Mental Status:  Mental Status Findings:  Cooperative         Anesthesia Plan  Type of Anesthesia, risks & benefits discussed:  Anesthesia Type:  general  Patient's Preference:   Intra-op Monitoring Plan:   Intra-op Monitoring Plan Comments:   Post Op Pain Control Plan:   Post Op Pain Control Plan Comments:   Induction:   IV  Beta Blocker:  Patient is not currently on a Beta-Blocker (No further documentation required).       Informed Consent: Patient understands risks and agrees with Anesthesia plan.  Questions answered. Anesthesia consent signed with patient.  ASA Score: 2     Day of Surgery Review of History & Physical:    H&P update referred to the surgeon.         Ready For Surgery From Anesthesia Perspective.

## 2019-06-24 NOTE — [employer] PATIENT INSTRUCTIONS
Discharge Summary/Instructions after an Endoscopic Procedure  Patient Name: Alejandro Wayne  Patient MRN: [id number]  Patient YOB: 1945  Monday, June 24, 2019  Jeremy Saleem MD  RESTRICTIONS:  During your procedure today, you received medications for sedation.  These   medications may affect your judgment, balance and coordination.  Therefore,   for 24 hours, you have the following restrictions:   - DO NOT drive a car, operate machinery, make legal/financial decisions,   sign important papers or drink alcohol.    ACTIVITY:  Today: no heavy lifting, straining or running due to procedural   sedation/anesthesia.  The following day: return to full activity including work.  DIET:  Eat and drink normally unless instructed otherwise.     TREATMENT FOR COMMON SIDE EFFECTS:  - Mild abdominal pain, nausea, belching, bloating or excessive gas:  rest,   eat lightly and use a heating pad.  - Sore Throat: treat with throat lozenges and/or gargle with warm salt   water.  - Because air was used during the procedure, expelling large amounts of air   from your rectum or belching is normal.  - If a bowel prep was taken, you may not have a bowel movement for 1-3 days.    This is normal.  SYMPTOMS TO WATCH FOR AND REPORT TO YOUR PHYSICIAN:  1. Abdominal pain or bloating, other than gas cramps.  2. Chest pain.  3. Back pain.  4. Signs of infection such as: chills or fever occurring within 24 hours   after the procedure.  5. Rectal bleeding, which would show as bright red, maroon, or black stools.   (A tablespoon of blood from the rectum is not serious, especially if   hemorrhoids are present.)  6. Vomiting.  7. Weakness or dizziness.  GO DIRECTLY TO THE NEAREST EMERGENCY ROOM IF YOU HAVE ANY OF THE FOLLOWING:      Difficulty breathing              Chills and/or fever over 101 F   Persistent vomiting and/or vomiting blood   Severe abdominal pain   Severe chest pain   Black, tarry stools   Bleeding- more than one tablespoon   Any  other symptom or condition that you feel may need urgent attention  Your doctor recommends these additional instructions:  If any biopsies were taken, your doctors clinic will contact you in 1 to 2   weeks with any results.  - Discharge patient to home (ambulatory).   - Resume previous diet; Discharge to home (ambulatory); Resume outpatient   medications  - Return to referring physician as previously scheduled.   - Await path results from gastric and duodenal biopsies  - Low fecal elastase and EUS findings support that pt may have early/mild   chronic pancreatitis. Would recommend continuing pancreatic enzyme   supplementation, and limiting or preferably stopping ETOH intake.  For questions, problems or results please call your physician - Jeremy Saleem MD at Work:  [phone].  [facility], EMERGENCY ROOM PHONE NUMBER: [phone]  IF A COMPLICATION OR EMERGENCY SITUATION ARISES AND YOU ARE UNABLE TO REACH   YOUR PHYSICIAN - GO DIRECTLY TO THE EMERGENCY ROOM.  Jeremy Saleem MD  6/24/2019 10:09:27 AM  This report has been verified and signed electronically.  [employer]

## 2019-06-24 NOTE — DISCHARGE INSTRUCTIONS
Endoscopic Ultrasound (EUS)    An endoscopic ultrasound (EUS) is a test to look at the inside of your gastrointestinal (GI) tract. It's commonly used to look for cancers or growths in the esophagus, stomach, pancreas, liver, and rectum. It can help to stage cancer (see how advanced a cancer is). EUS may also be used to help diagnose certain diseases or to drain cysts or abscesses.  What is EUS?  EUS shows both ultrasound images and live video of the GI tract. During the test, a flexible tube called an endoscope (scope) is used. At the end of the scope is a tiny video camera and light. The video camera sends live images to a monitor. The scope also contains a very small ultrasound device. This uses sound waves to create images and send them to a monitor.  A needle is passed through the scope. The needle can be used take a small sample of tissue for testing. This is called a biopsy. The needle can be used to take a sample of fluid. This is called fine-needle aspiration (FNA).  Risks and possible complications of EUS  Risks and possible complications include the following:  · Bleeding  · Infection  · A perforation (hole) in the digestive tract   · Risks of sedation or anesthesia   Before the test  Be prepared prior to the test:  · Tell your healthcare provider what medicine you take. This includes vitamins, herbs, and over-the-counter medicine. It also includes any blood thinners, such as warfarin, clopidogrel, ibuprofen, or daily aspirin. Ask your healthcare provider if you need to stop taking some or all of them before the test.  · You may be prescribed antibiotics to take before or after the test. This depends on the area being studied and what is done during the test. These medicines help prevent infection.  · Carefully follow the instructions for preparing for the test to make sure results are accurate. Instructions may include:  ¨ If youre having an EUS of the upper GI tract (esophagus, stomach, duodenum,  pancreas, liver):  § Do not eat or drink for 6 hours before the test.  ¨ If youre having an EUS of the lower GI tract (rectum):  § Before the test, do bowel prep as instructed to clean your rectum of stool. This may involve a clear liquid diet and using a laxative (liquid or pills) the night before the test. Or it may mean doing one or more enemas the morning of the test.  § Do not eat or drink for 6 hours before the test.  · Be sure to arrive on time at the facility. Bring your identification and health insurance card. Leave valuables at home. If you have them, bring X-rays or other test results with you.  Let the healthcare provider know  For your safety, tell the healthcare provider if you:  · Take insulin. Your dose may need to be changed on the day of your test.  · Are allergic to latex.  · Have any other allergies.  · Are taking blood thinners.   During the test  An endoscopic ultrasound usually takes place in a hospital. The procedure itself may take 1 to 2 hours. You will likely go home soon afterward. During the test:  · You lie on your left side on an exam table.  · An intravenous (IV) line will be put into a vein in your arm or hand. This line supplies fluids and medicines. To keep you comfortable during the test, you will be given a sedative medicine. This medicine prevents discomfort and will make you sleepy.  · If you are having an EUS of the upper GI tract, local anesthetic may be sprayed in your throat. This will help you be more comfortable as the healthcare provider inserts the scope. The healthcare provider then gently puts the flexible scope into your mouth or nose and down your throat.  · If youre having an EUS of the lower GI tract, the healthcare provider gently puts the flexible scope into your anus.  · During the test, the scope sends live video and ultrasound images from inside your body to nearby monitors. These are used to examine your GI tract. Specialized procedures, such as drainage,  are done as needed.  · The healthcare provider may discuss the results with you soon after the test. Biopsy results take several  days.  · In most cases, you can go home within a few hours of the test. When you leave the facility, have an adult family member or friend drive you, even if you don't feel that sleepy.  After the test  Here is what to expect after the test:  · You may feel tired from the sedative. This should wear off by the end of the day.  · If you had an upper digestive endoscopy, your throat may feel sore for a day or two. Over-the-counter sore throat lozenges and spray should help.  · You can eat and drink normally as soon as the test is done.  When to call the healthcare provider  Call your healthcare provider if you notice any of the following:  · Fever of 100.4°F (38.0°C) or higher, or as advised by your healthcare provider  · Shortness of breath  · Vomiting blood, blood in stool, or black stools  · Coughing or hoarse voice that wont go away   Date Last Reviewed: 7/1/2016  © 4680-8038 [employer] [address] [place] [address]. All rights reserved. This information is not intended as a substitute for professional medical care. Always follow your healthcare professional's instructions.

## 2019-06-24 NOTE — PROGRESS NOTES
Pt discharged to home.  Discharge instructions given, pt and wife stated understanding.  , IV removed.  Pt left via wheelchair with wife to home.

## 2019-07-07 DIAGNOSIS — K86.81 EXOCRINE PANCREATIC INSUFFICIENCY: ICD-10-CM

## 2019-07-09 ENCOUNTER — HOSPITAL ENCOUNTER (OUTPATIENT)
Dept: RADIOLOGY | Facility: HOSPITAL | Age: 74
Discharge: HOME | End: 2019-07-09
Attending: INTERNAL MEDICINE
Payer: COMMERCIAL

## 2019-07-09 ENCOUNTER — TELEPHONE (OUTPATIENT)
Dept: ENDOSCOPY | Facility: HOSPITAL | Age: 74
End: 2019-07-09

## 2019-07-09 ENCOUNTER — TELEPHONE (OUTPATIENT)
Dept: GASTROENTEROLOGY | Facility: CLINIC | Age: 74
End: 2019-07-09

## 2019-07-09 DIAGNOSIS — K76.89 LIVER CYST: Primary | ICD-10-CM

## 2019-07-09 DIAGNOSIS — K76.89 LIVER CYST: ICD-10-CM

## 2019-07-09 PROCEDURE — 76705 ECHO EXAM OF ABDOMEN: CPT | Mod: TC

## 2019-07-09 PROCEDURE — 76705 US ABDOMEN LIMITED: ICD-10-PCS | Mod: 26,,, | Performed by: RADIOLOGY

## 2019-07-09 PROCEDURE — 76705 ECHO EXAM OF ABDOMEN: CPT | Mod: 26,,, | Performed by: RADIOLOGY

## 2019-07-09 NOTE — TELEPHONE ENCOUNTER
Spoke with patient. Offered him an appointment with  on 8/1 for 10:20.  He stated he will not be back from [place].  Will call the hepatology department to reschedule.

## 2019-07-09 NOTE — TELEPHONE ENCOUNTER
----- Message from Marco Mathews MD sent at 7/9/2019 12:32 PM CDT -----  Yumi - please schedule Alejandro for Hepatology follow up with Dr. Zambrano for his liver cysts.  Referral placed.    Alejandro your Ultrasound of your liver shows your liver cyst to be relatively stable in sized but your 1/2016 Hepatology clinic apt with Dr. Zambrano recommended you to follow up and I don't see that you saw her again.    Impression      Multiple simple to complex hepatic cysts as above.  The largest and most complex right hepatic lobe cyst has not changed in size or appearance when compared to ultrasound 11/30/2015.  No convincing detrimental interval changes with regards to the hepatic cysts.    Mild prominence of the pancreatic duct, a nonspecific finding.    Electronically signed by resident: Charbel Zambrano  Date: 07/09/2019  Time: 10:39    Electronically signed by: Millicent Mora MD

## 2019-07-12 ENCOUNTER — DOCUMENTATION ONLY (OUTPATIENT)
Dept: TRANSPLANT | Facility: CLINIC | Age: 74
End: 2019-07-12

## 2019-07-12 NOTE — LETTER
July 12, 2019    Alejandro Wayne  [address] [place] [id number]      Dear Alejandro Wayne:    Your doctor has referred you to the [facility]. We are sending this letter to remind you to make an appointment with us to complete the referral process.     Please call us at [phone] to schedule an appointment. We look forward to seeing you soon.     If you received a call and have been scheduled, please disregard this letter.       Sincerely,        [facility]   [facility] [place], [place] [id number] [phone]

## 2019-07-12 NOTE — NURSING
Pt records reviewed.   Pt will be referred to Hepatology.  Liver cyst  Initial referral received  from the workque.   Referring Provider/diagnosis  Marco Mathews MD      Referral letter sent to patient.

## 2019-08-01 ENCOUNTER — TELEPHONE (OUTPATIENT)
Dept: HEPATOLOGY | Facility: CLINIC | Age: 74
End: 2019-08-01

## 2019-08-01 ENCOUNTER — OFFICE VISIT (OUTPATIENT)
Dept: HEPATOLOGY | Facility: CLINIC | Age: 74
End: 2019-08-01
Payer: COMMERCIAL

## 2019-08-01 ENCOUNTER — PROCEDURE VISIT (OUTPATIENT)
Dept: HEPATOLOGY | Facility: CLINIC | Age: 74
End: 2019-08-01
Attending: INTERNAL MEDICINE
Payer: COMMERCIAL

## 2019-08-01 VITALS
DIASTOLIC BLOOD PRESSURE: 66 MMHG | RESPIRATION RATE: 17 BRPM | HEIGHT: 67 IN | SYSTOLIC BLOOD PRESSURE: 129 MMHG | TEMPERATURE: 99 F | HEART RATE: 69 BPM | OXYGEN SATURATION: 97 % | WEIGHT: 166.69 LBS | BODY MASS INDEX: 26.16 KG/M2

## 2019-08-01 DIAGNOSIS — K76.89 LIVER CYST: ICD-10-CM

## 2019-08-01 DIAGNOSIS — R79.89 ELEVATED LIVER FUNCTION TESTS: ICD-10-CM

## 2019-08-01 DIAGNOSIS — R79.89 ELEVATED LIVER FUNCTION TESTS: Primary | ICD-10-CM

## 2019-08-01 DIAGNOSIS — K76.0 FATTY LIVER: Primary | ICD-10-CM

## 2019-08-01 PROCEDURE — 99999 PR PBB SHADOW E&M-EST. PATIENT-LVL IV: ICD-10-PCS | Mod: PBBFAC,,, | Performed by: INTERNAL MEDICINE

## 2019-08-01 PROCEDURE — 99999 PR PBB SHADOW E&M-EST. PATIENT-LVL IV: CPT | Mod: PBBFAC,,, | Performed by: INTERNAL MEDICINE

## 2019-08-01 PROCEDURE — 99214 OFFICE O/P EST MOD 30 MIN: CPT | Mod: S$GLB,,, | Performed by: INTERNAL MEDICINE

## 2019-08-01 PROCEDURE — 99214 PR OFFICE/OUTPT VISIT, EST, LEVL IV, 30-39 MIN: ICD-10-PCS | Mod: S$GLB,,, | Performed by: INTERNAL MEDICINE

## 2019-08-01 PROCEDURE — 1101F PT FALLS ASSESS-DOCD LE1/YR: CPT | Mod: CPTII,S$GLB,, | Performed by: INTERNAL MEDICINE

## 2019-08-01 PROCEDURE — 3074F SYST BP LT 130 MM HG: CPT | Mod: CPTII,S$GLB,, | Performed by: INTERNAL MEDICINE

## 2019-08-01 PROCEDURE — 1101F PR PT FALLS ASSESS DOC 0-1 FALLS W/OUT INJ PAST YR: ICD-10-PCS | Mod: CPTII,S$GLB,, | Performed by: INTERNAL MEDICINE

## 2019-08-01 PROCEDURE — 3078F DIAST BP <80 MM HG: CPT | Mod: CPTII,S$GLB,, | Performed by: INTERNAL MEDICINE

## 2019-08-01 PROCEDURE — 3078F PR MOST RECENT DIASTOLIC BLOOD PRESSURE < 80 MM HG: ICD-10-PCS | Mod: CPTII,S$GLB,, | Performed by: INTERNAL MEDICINE

## 2019-08-01 PROCEDURE — 91200 LIVER ELASTOGRAPHY: CPT | Mod: S$GLB,,, | Performed by: INTERNAL MEDICINE

## 2019-08-01 PROCEDURE — 91200 PR LIVER ELASTOGRAPHY W/OUT IMAG W/INTERP & REPORT: ICD-10-PCS | Mod: S$GLB,,, | Performed by: INTERNAL MEDICINE

## 2019-08-01 PROCEDURE — 3074F PR MOST RECENT SYSTOLIC BLOOD PRESSURE < 130 MM HG: ICD-10-PCS | Mod: CPTII,S$GLB,, | Performed by: INTERNAL MEDICINE

## 2019-08-01 NOTE — PROCEDURES
Procedures Fibroscan Procedure     Name: Alejandro Wayne  Date of Procedure : 2019   :: Harriet Zambrano MD  Diagnosis: NAFLD    Probe: M    Fibroscan readin.1 KPa    Fibrosis:F3     CAP readin dB/m    Steatosis: :S1

## 2019-08-01 NOTE — PROGRESS NOTES
HEPATOLOGY FOLLOW UP    Referring Physician: Marco Mathews MD  Current Corresponding Physician: Marco Mathews MD    Alejandro Wayne is here for follow up of Liver Cyst  I saw this patient in 01/11/16: He had imaging with a CT scan in 2011 that revealed hepatic cysts. Subsequently he had a repeat CT scan in Nov of 2015 as part of a work up for diarrhea. The scan once again revealed hepatic cysts similar to prior, with a few that were larger. An abdominal u/s done the same day revealed in the right hepatic lobe cysts that measure 3.2 x 2.7 by 2.5-cm with a septation making it a complex cyst. Liver enzymes were liver normal. MRI was suggested but was not done.    HPI  Since Alejandro JENNIFER Tone's last visit he had a ct scan of the abdomen on 10/2/18: The liver is normal in size and attenuation. There are several stable hepatic hypodensities.  The most conspicuous somewhat lobulated lesion in the right lobe of the liver measures 3.5 cm (axial series 2, image 32).  This lesion is stable in size.  These hypodensities are most likely consistent with hepatic cysts.    He then had an abdominal US 7/9/19: Multiple stable hypodensities in the liver as above.  These are most consistent with hepatic cysts.    He has been diagnosed with mild pancreatic insufficiency. He is now on creon which is controlling his diarrhea.    6/11/19: liver enzymes remain normal (ALT 23, AST 24)    Outpatient Encounter Medications as of 8/1/2019   Medication Sig Dispense Refill    amLODIPine (NORVASC) 5 MG tablet Take 1 tablet (5 mg total) by mouth once daily. 90 tablet 3    ascorbic acid (VITAMIN C) 1000 MG tablet Take 1,000 mg by mouth once daily.      aspirin (ECOTRIN) 81 MG EC tablet Take 81 mg by mouth once daily.      cetirizine (ZYRTEC) 10 MG tablet Take 10 mg by mouth once daily.      ciprofloxacin HCl (CIPRO) 500 MG tablet 20TK 1 T PO  BID 20 tablet 1    diphenoxylate-atropine 2.5-0.025 mg (LOMOTIL) 2.5-0.025 mg per tablet 1-2 pills  qid prn diarrhea 50 tablet 0    eszopiclone (LUNESTA) 2 MG Tab TAKE 1 TABLET BY MOUTH EVERY NIGHT AT BEDTIME AS NEEDED FOR SLEEP 30 tablet 0    lipase-protease-amylase (CREON) 36,000-114,000- 180,000 unit CpDR Take 3 capsules by mouth 3 (three) times daily with meals. One capsule with every snack.  Not to exceed 12 pills a day. 810 capsule 3    multivitamin (MULTIVITAMIN) per tablet Take 1 tablet by mouth once daily.      rosuvastatin (CRESTOR) 20 MG tablet Take 1 tablet (20 mg total) by mouth once daily. 90 tablet 3    vitamin E 1000 UNIT capsule Take 1,000 Units by mouth once daily.      tadalafil (CIALIS) 20 MG Tab Take 1 tablet (20 mg total) by mouth daily as needed. 10 tablet 11     No facility-administered encounter medications on file as of 8/1/2019.      Review of patient's allergies indicates:   Allergen Reactions    Meloxicam      iarrhea     Past Medical History:   Diagnosis Date    Basal cell carcinoma     BPH (benign prostatic hyperplasia)     Colon polyp     Hyperlipidemia     Hypertension     Liver cyst 1/11/2016    Prostate nodule        Review of Systems   Constitutional: Negative.    HENT: Negative.    Eyes: Negative.    Respiratory: Negative.    Cardiovascular: Negative.    Gastrointestinal: Negative.    Genitourinary: Negative.    Musculoskeletal: Negative.    Skin: Negative.    Neurological: Negative.    Psychiatric/Behavioral: Negative.      Vitals:    08/01/19 1029   BP: 129/66   Pulse: 69   Resp: 17   Temp: 98.6 °F (37 °C)       Physical Exam   Constitutional: He is oriented to person, place, and time. He appears well-developed and well-nourished.   HENT:   Head: Normocephalic and atraumatic.   Eyes: Pupils are equal, round, and reactive to light. Conjunctivae and EOM are normal. No scleral icterus.   Neck: Normal range of motion. Neck supple. No thyromegaly present.   Cardiovascular: Normal rate, regular rhythm and normal heart sounds.   Pulmonary/Chest: Effort normal and  breath sounds normal. He has no rales.   Abdominal: Soft. Bowel sounds are normal. He exhibits no distension and no mass. There is no tenderness.   Musculoskeletal: Normal range of motion. He exhibits no edema.   Neurological: He is alert and oriented to person, place, and time.   Skin: Skin is warm and dry. No rash noted.   Psychiatric: He has a normal mood and affect.   Vitals reviewed.      Lab Results   Component Value Date     06/11/2019    BUN 12 06/11/2019    CREATININE 0.7 06/11/2019    CALCIUM 9.4 06/11/2019     06/11/2019    K 3.9 06/11/2019     06/11/2019    PROT 7.4 06/11/2019    CO2 25 06/11/2019    ANIONGAP 9 06/11/2019    WBC 6.83 06/11/2019    RBC 4.68 06/11/2019    HGB 13.8 (L) 06/11/2019    HCT 42.6 06/11/2019    MCV 91 06/11/2019    MCH 29.5 06/11/2019    MCHC 32.4 06/11/2019     Lab Results   Component Value Date    RDW 13.2 06/11/2019     06/11/2019    MPV 10.0 06/11/2019    GRAN 4.5 06/11/2019    GRAN 66.3 06/11/2019    LYMPH 1.2 06/11/2019    LYMPH 17.0 (L) 06/11/2019    MONO 0.7 06/11/2019    MONO 10.4 06/11/2019    EOSINOPHIL 4.1 06/11/2019    BASOPHIL 1.6 06/11/2019    EOS 0.3 06/11/2019    BASO 0.11 06/11/2019    CHOL 136 06/04/2018    TRIG 52 06/04/2018    HDL 58 06/04/2018    CHOLHDL 42.6 06/04/2018    TOTALCHOLEST 2.3 06/04/2018    ALBUMIN 3.9 06/11/2019    BILIDIR 0.3 10/02/2018    AST 24 06/11/2019    ALT 23 06/11/2019    ALKPHOS 69 06/11/2019    PSA 1.8 02/07/2019       Assessment and Plan:    Alejandro Wayne is a 74 y.o. male with several Liver Cyst  Some are complex. Overall they are stable over many years. I am recommending every 6 month surveillance with US alt with MRI.    Since he has a history of alcohol use and now has pancreatic insufficiency, I am reocmmending a fibroscan to ensure he does not have significant fibrosis.    Return 6 months    Addendum: Fibroscan F3 fibrosis and S1 steatosis. Recommend MRE to confirm significant fibrosis.

## 2019-08-15 ENCOUNTER — TELEPHONE (OUTPATIENT)
Dept: DERMATOLOGY | Facility: CLINIC | Age: 74
End: 2019-08-15

## 2019-08-15 NOTE — TELEPHONE ENCOUNTER
Contacted Mr. Wayne, left vm in regards to a bx that Dr. East sent over will need photos of site to get him scheduled for Mohs.

## 2019-08-17 ENCOUNTER — HOSPITAL ENCOUNTER (OUTPATIENT)
Dept: RADIOLOGY | Facility: HOSPITAL | Age: 74
Discharge: HOME | End: 2019-08-17
Attending: INTERNAL MEDICINE
Payer: COMMERCIAL

## 2019-08-17 DIAGNOSIS — K76.0 FATTY LIVER: ICD-10-CM

## 2019-08-17 PROCEDURE — 74181 MRI ABDOMEN W/O CONTRAST: CPT | Mod: 26,,, | Performed by: RADIOLOGY

## 2019-08-17 PROCEDURE — 74181 MRI ABDOMEN W/O CONTRAST: CPT | Mod: TC

## 2019-08-17 PROCEDURE — 74181 MRI ABDOMEN WITHOUT CONTRAST: ICD-10-PCS | Mod: 26,,, | Performed by: RADIOLOGY

## 2019-08-19 ENCOUNTER — PATIENT MESSAGE (OUTPATIENT)
Dept: HEPATOLOGY | Facility: CLINIC | Age: 74
End: 2019-08-19

## 2019-08-19 ENCOUNTER — TELEPHONE (OUTPATIENT)
Dept: DERMATOLOGY | Facility: CLINIC | Age: 74
End: 2019-08-19

## 2019-08-19 ENCOUNTER — TELEPHONE (OUTPATIENT)
Dept: ENDOSCOPY | Facility: HOSPITAL | Age: 74
End: 2019-08-19

## 2019-08-19 ENCOUNTER — PATIENT MESSAGE (OUTPATIENT)
Dept: ENDOSCOPY | Facility: HOSPITAL | Age: 74
End: 2019-08-19

## 2019-08-20 ENCOUNTER — TELEPHONE (OUTPATIENT)
Dept: HEPATOLOGY | Facility: CLINIC | Age: 74
End: 2019-08-20

## 2019-08-21 ENCOUNTER — TELEPHONE (OUTPATIENT)
Dept: ENDOSCOPY | Facility: HOSPITAL | Age: 74
End: 2019-08-21

## 2019-08-21 ENCOUNTER — TELEPHONE (OUTPATIENT)
Dept: DERMATOLOGY | Facility: CLINIC | Age: 74
End: 2019-08-21

## 2019-08-21 NOTE — TELEPHONE ENCOUNTER
----- Message from Gertrudis Flores sent at 8/21/2019  2:29 PM CDT -----  Contact: Alejandro   tel:  cell:  [phone]   Needs Advice    Reason for call:   Pt.says he was told by his drMatt To call your office and get scheduled with Dr. Walker, as per his Dr.: Dr. Erica East.      Pls call to discuss a time and day that is good for him to come in.   As per pt.         Communication Preference: phone     Additional Information:  pls call .

## 2019-08-21 NOTE — TELEPHONE ENCOUNTER
Contacted  Tone he will need to send in photos of his site, he stated that he will do so. Gave him email address to send photo.

## 2019-08-22 ENCOUNTER — TELEPHONE (OUTPATIENT)
Dept: DERMATOLOGY | Facility: CLINIC | Age: 74
End: 2019-08-22

## 2019-08-22 ENCOUNTER — OFFICE VISIT (OUTPATIENT)
Dept: HEPATOLOGY | Facility: CLINIC | Age: 74
End: 2019-08-22
Payer: COMMERCIAL

## 2019-08-22 VITALS
RESPIRATION RATE: 17 BRPM | SYSTOLIC BLOOD PRESSURE: 116 MMHG | OXYGEN SATURATION: 100 % | HEART RATE: 70 BPM | BODY MASS INDEX: 24.24 KG/M2 | TEMPERATURE: 99 F | DIASTOLIC BLOOD PRESSURE: 63 MMHG | HEIGHT: 67 IN | WEIGHT: 154.44 LBS

## 2019-08-22 DIAGNOSIS — Z12.11 SPECIAL SCREENING FOR MALIGNANT NEOPLASMS, COLON: Primary | ICD-10-CM

## 2019-08-22 DIAGNOSIS — R74.8 ELEVATED LIVER ENZYMES: Primary | ICD-10-CM

## 2019-08-22 DIAGNOSIS — K76.89 LIVER CYST: ICD-10-CM

## 2019-08-22 PROCEDURE — 99213 OFFICE O/P EST LOW 20 MIN: CPT | Mod: S$GLB,,, | Performed by: INTERNAL MEDICINE

## 2019-08-22 PROCEDURE — 99999 PR PBB SHADOW E&M-EST. PATIENT-LVL IV: ICD-10-PCS | Mod: PBBFAC,,, | Performed by: INTERNAL MEDICINE

## 2019-08-22 PROCEDURE — 1101F PT FALLS ASSESS-DOCD LE1/YR: CPT | Mod: CPTII,S$GLB,, | Performed by: INTERNAL MEDICINE

## 2019-08-22 PROCEDURE — 3078F PR MOST RECENT DIASTOLIC BLOOD PRESSURE < 80 MM HG: ICD-10-PCS | Mod: CPTII,S$GLB,, | Performed by: INTERNAL MEDICINE

## 2019-08-22 PROCEDURE — 99213 PR OFFICE/OUTPT VISIT, EST, LEVL III, 20-29 MIN: ICD-10-PCS | Mod: S$GLB,,, | Performed by: INTERNAL MEDICINE

## 2019-08-22 PROCEDURE — 3078F DIAST BP <80 MM HG: CPT | Mod: CPTII,S$GLB,, | Performed by: INTERNAL MEDICINE

## 2019-08-22 PROCEDURE — 3074F PR MOST RECENT SYSTOLIC BLOOD PRESSURE < 130 MM HG: ICD-10-PCS | Mod: CPTII,S$GLB,, | Performed by: INTERNAL MEDICINE

## 2019-08-22 PROCEDURE — 1101F PR PT FALLS ASSESS DOC 0-1 FALLS W/OUT INJ PAST YR: ICD-10-PCS | Mod: CPTII,S$GLB,, | Performed by: INTERNAL MEDICINE

## 2019-08-22 PROCEDURE — 3074F SYST BP LT 130 MM HG: CPT | Mod: CPTII,S$GLB,, | Performed by: INTERNAL MEDICINE

## 2019-08-22 PROCEDURE — 99999 PR PBB SHADOW E&M-EST. PATIENT-LVL IV: CPT | Mod: PBBFAC,,, | Performed by: INTERNAL MEDICINE

## 2019-08-22 RX ORDER — SODIUM, POTASSIUM,MAG SULFATES 17.5-3.13G
1 SOLUTION, RECONSTITUTED, ORAL ORAL ONCE
Qty: 1 BOTTLE | Refills: 0 | Status: SHIPPED | OUTPATIENT
Start: 2019-08-22 | End: 2019-08-22

## 2019-08-23 ENCOUNTER — TELEPHONE (OUTPATIENT)
Dept: DERMATOLOGY | Facility: CLINIC | Age: 74
End: 2019-08-23

## 2019-08-23 NOTE — TELEPHONE ENCOUNTER
Pt was scheduled for Mohs sx on 9/3/19 at 10:00 for SCC left lower leg. Pt verbally confirmed appt date and time.

## 2019-08-23 NOTE — TELEPHONE ENCOUNTER
----- Message from Rolanda Femi sent at 8/23/2019  9:10 AM CDT -----  Contact: pt at [phone]  Patient Requesting Sooner Appointment.     Reason for sooner appt.:Wants to move his surgery date up from Sept 3 to the week of Aug. 26-29 if possible  When is the first available appointment?Already states that he is scheduled for September 3.  But I dont see that appt.  Communication Preference:call on cell at [phone]  Additional Information:

## 2019-08-25 NOTE — PROGRESS NOTES
HEPATOLOGY FOLLOW UP    Referring Physician: Marco Mathews MD  Current Corresponding Physician: Marco Mathews MD    Alejandro Wayne is here for follow up of Liver Cyst  I saw this patient in 01/11/16: He had imaging with a CT scan in 2011 that revealed hepatic cysts. Subsequently he had a repeat CT scan in Nov of 2015 as part of a work up for diarrhea. The scan once again revealed hepatic cysts similar to prior, with a few that were larger. An abdominal u/s done the same day revealed in the right hepatic lobe cysts that measure 3.2 x 2.7 by 2.5-cm with a septation making it a complex cyst. Liver enzymes were liver normal. MRI was suggested but was not done.    HPI  Since Alejandro Flowersb's last visit he had a ct scan of the abdomen on 10/2/18: The liver is normal in size and attenuation. There are several stable hepatic hypodensities.  The most conspicuous somewhat lobulated lesion in the right lobe of the liver measures 3.5 cm (axial series 2, image 32).  This lesion is stable in size.  These hypodensities are most likely consistent with hepatic cysts.    He then had an abdominal US 7/9/19: Multiple stable hypodensities in the liver as above.  These are most consistent with hepatic cysts. Some are complex. Overall they are stable over many years. I had previously recommended every 6 month surveillance with US alt with MRI.    He has been diagnosed with mild pancreatic insufficiency. He is now on creon which is controlling his diarrhea.    6/11/19: liver enzymes remain normal (ALT 23, AST 24)    Given a hx of heavy alcohol in the past, I had him do a fibroscan that suggested F3 fibrosis and a subsequent MRE suggested F0 fibrosis.    Outpatient Encounter Medications as of 8/22/2019   Medication Sig Dispense Refill    amLODIPine (NORVASC) 5 MG tablet Take 1 tablet (5 mg total) by mouth once daily. 90 tablet 3    ascorbic acid (VITAMIN C) 1000 MG tablet Take 1,000 mg by mouth once daily.      aspirin (ECOTRIN) 81  MG EC tablet Take 81 mg by mouth once daily.      cetirizine (ZYRTEC) 10 MG tablet Take 10 mg by mouth once daily.      ciprofloxacin HCl (CIPRO) 500 MG tablet 20TK 1 T PO  BID 20 tablet 1    diphenoxylate-atropine 2.5-0.025 mg (LOMOTIL) 2.5-0.025 mg per tablet 1-2 pills qid prn diarrhea 50 tablet 0    eszopiclone (LUNESTA) 2 MG Tab TAKE 1 TABLET BY MOUTH EVERY NIGHT AT BEDTIME AS NEEDED FOR SLEEP 30 tablet 0    lipase-protease-amylase (CREON) 36,000-114,000- 180,000 unit CpDR Take 3 capsules by mouth 3 (three) times daily with meals. One capsule with every snack.  Not to exceed 12 pills a day. 810 capsule 3    multivitamin (MULTIVITAMIN) per tablet Take 1 tablet by mouth once daily.      rosuvastatin (CRESTOR) 20 MG tablet Take 1 tablet (20 mg total) by mouth once daily. 90 tablet 3    [] sodium,potassium,mag sulfates (SUPREP BOWEL PREP KIT) 17.5-3.13-1.6 gram SolR Take 177 mLs by mouth once. for 1 dose 1 Bottle 0    vitamin E 1000 UNIT capsule Take 1,000 Units by mouth once daily.      tadalafil (CIALIS) 20 MG Tab Take 1 tablet (20 mg total) by mouth daily as needed. 10 tablet 11     No facility-administered encounter medications on file as of 2019.      Review of patient's allergies indicates:   Allergen Reactions    Meloxicam      iarrhea     Past Medical History:   Diagnosis Date    Basal cell carcinoma     BPH (benign prostatic hyperplasia)     Colon polyp     Hyperlipidemia     Hypertension     Liver cyst 2016    Prostate nodule        Review of Systems   Constitutional: Negative.    HENT: Negative.    Eyes: Negative.    Respiratory: Negative.    Cardiovascular: Negative.    Gastrointestinal: Negative.    Genitourinary: Negative.    Musculoskeletal: Negative.    Skin: Negative.    Neurological: Negative.    Psychiatric/Behavioral: Negative.      Vitals:    19 1210   BP: 116/63   Pulse: 70   Resp: 17   Temp: 98.6 °F (37 °C)       Physical Exam   Constitutional: He is  oriented to person, place, and time. He appears well-developed and well-nourished.   HENT:   Head: Normocephalic and atraumatic.   Eyes: Pupils are equal, round, and reactive to light. Conjunctivae and EOM are normal. No scleral icterus.   Neck: Normal range of motion. Neck supple. No thyromegaly present.   Cardiovascular: Normal rate, regular rhythm and normal heart sounds.   Pulmonary/Chest: Effort normal and breath sounds normal. He has no rales.   Abdominal: Soft. Bowel sounds are normal. He exhibits no distension and no mass. There is no tenderness.   Musculoskeletal: Normal range of motion. He exhibits no edema.   Neurological: He is alert and oriented to person, place, and time.   Skin: Skin is warm and dry. No rash noted.   Psychiatric: He has a normal mood and affect.   Vitals reviewed.      Lab Results   Component Value Date     06/11/2019    BUN 12 06/11/2019    CREATININE 0.7 06/11/2019    CALCIUM 9.4 06/11/2019     06/11/2019    K 3.9 06/11/2019     06/11/2019    PROT 7.4 06/11/2019    CO2 25 06/11/2019    ANIONGAP 9 06/11/2019    WBC 6.83 06/11/2019    RBC 4.68 06/11/2019    HGB 13.8 (L) 06/11/2019    HCT 42.6 06/11/2019    MCV 91 06/11/2019    MCH 29.5 06/11/2019    MCHC 32.4 06/11/2019     Lab Results   Component Value Date    RDW 13.2 06/11/2019     06/11/2019    MPV 10.0 06/11/2019    GRAN 4.5 06/11/2019    GRAN 66.3 06/11/2019    LYMPH 1.2 06/11/2019    LYMPH 17.0 (L) 06/11/2019    MONO 0.7 06/11/2019    MONO 10.4 06/11/2019    EOSINOPHIL 4.1 06/11/2019    BASOPHIL 1.6 06/11/2019    EOS 0.3 06/11/2019    BASO 0.11 06/11/2019    CHOL 136 06/04/2018    TRIG 52 06/04/2018    HDL 58 06/04/2018    CHOLHDL 42.6 06/04/2018    TOTALCHOLEST 2.3 06/04/2018    ALBUMIN 3.9 06/11/2019    BILIDIR 0.3 10/02/2018    AST 24 06/11/2019    ALT 23 06/11/2019    ALKPHOS 69 06/11/2019    PSA 1.8 02/07/2019       Assessment and Plan:    Alejandro Wayne is a 74 y.o. male with several Liver  Cyst  Some are complex. Overall they are stable over many years. I am recommending every 6 month surveillance with US alt with MRI.    Since he has a history of alcohol use and now has pancreatic insufficiency, I am reocmmending a fibroscan to ensure he does not have significant fibrosis.    Fibroscan F3 fibrosis and S1 steatosis. MRE F0. A biopsy would help to determine which test is correct. However, since F3 would necessitate screening every 6 months with imaging and this is being done anyway, I have deferred the liver biopsy.    Return 01/20 with repeat US.    .

## 2019-09-03 ENCOUNTER — PROCEDURE VISIT (OUTPATIENT)
Dept: DERMATOLOGY | Facility: CLINIC | Age: 74
End: 2019-09-03
Payer: COMMERCIAL

## 2019-09-03 VITALS
HEIGHT: 67 IN | BODY MASS INDEX: 24.17 KG/M2 | WEIGHT: 154 LBS | SYSTOLIC BLOOD PRESSURE: 127 MMHG | DIASTOLIC BLOOD PRESSURE: 76 MMHG | HEART RATE: 66 BPM

## 2019-09-03 DIAGNOSIS — C44.729 SQUAMOUS CELL CARCINOMA OF SKIN OF LEFT LOWER EXTREMITY: Primary | ICD-10-CM

## 2019-09-03 PROCEDURE — 17314: ICD-10-PCS | Mod: S$GLB,,, | Performed by: DERMATOLOGY

## 2019-09-03 PROCEDURE — 17313 MOHS 1 STAGE T/A/L: CPT | Mod: 59,S$GLB,, | Performed by: DERMATOLOGY

## 2019-09-03 PROCEDURE — 13121 CMPLX RPR S/A/L 2.6-7.5 CM: CPT | Mod: S$GLB,,, | Performed by: DERMATOLOGY

## 2019-09-03 PROCEDURE — 99499 UNLISTED E&M SERVICE: CPT | Mod: S$GLB,,, | Performed by: DERMATOLOGY

## 2019-09-03 PROCEDURE — 13121 PR RECMPL WND SCALP,EXTR 2.6-7.5 CM: ICD-10-PCS | Mod: S$GLB,,, | Performed by: DERMATOLOGY

## 2019-09-03 PROCEDURE — 17313: ICD-10-PCS | Mod: 59,S$GLB,, | Performed by: DERMATOLOGY

## 2019-09-03 PROCEDURE — 17314 MOHS ADDL STAGE T/A/L: CPT | Mod: S$GLB,,, | Performed by: DERMATOLOGY

## 2019-09-03 PROCEDURE — 99499 NO LOS: ICD-10-PCS | Mod: S$GLB,,, | Performed by: DERMATOLOGY

## 2019-09-03 NOTE — PROGRESS NOTES
PROCEDURE: Mohs' Micrographic Surgery    INDICATION: Tumors with aggressive clinical behavior (rapidly growing, greater than 1 cm in diameter). Tumor with ill-defined borders. Aggressive histopathology including sclerosing, morpheaform/infiltrating, micronodular, superficial multicentric, poorly differentiated, basosquamous, or perineural invasion.    REFERRING MD: Sri East M.D.    CASE NUMBER:     ANESTHETIC: 11 cc 0.5% Lidocaine with Epi 1:200,000 mixed 1:1 with 0.5% Bupivacaine    SURGICAL PREP: Hibiclens    SURGEON: Quentin Walker MD    ASSISTANTS: Terri Tamez PA-C, Jihan Montoya, Surg Tech and Darrell Azul, Surg Tech    PREOPERATIVE DIAGNOSIS: squamous cell carcinoma    POSTOPERATIVE DIAGNOSIS: squamous cell carcinoma    PATHOLOGIC DIAGNOSIS: squamous cell carcinoma- infiltrating, well differentiated, superficial    HISTOLOGY OF SPECIMENS IN FIRST STAGE:   Tumor Type: Tumor seen. Superficial squamous cell carcinoma: Proliferation of squamous cells exhibiting atypia and infiltrating within the superficial papillary dermis.  Well-differentiated squamous cell carcinoma: Proliferation of squamous cells exhibiting atypia and infiltrating within the dermis.   Depth of Invasion: epidermis and dermis  Perineural Invasion: No    HISTOLOGY OF SPECIMENS IN SUBSEQUENT STAGES:  · Tumor Type: No tumor seen.    STAGES OF MOHS' SURGERY PERFORMED: 2    TUMOR-FREE PLANE ACHIEVED: Yes    HEMOSTASIS: electrocoagulation     SPECIMENS: 5 (3 in stage A and 2 in stage B)    LOCATION: left upper leg. Patient verified location.    INITIAL LESION SIZE: 1.5 x 1.5 cm    FINAL DEFECT SIZE: 2.0 x 2.7 cm    WOUND REPAIR/DISPOSITION: The patient tolerated Mohs' Micrographic Surgery for a squamous cell carcinoma very well. When the tumor was completely removed, a repair of the surgical defect was undertaken.      PROCEDURE: Complex Linear Repair    INDICATION: Status post Mohs' Micrographic Surgery for squamous cell  "carcinoma.    CASE NUMBER:     SURGEON: Quentin Walker MD    ASSISTANTS: Terri Tamez PA-C and Jihan Montoya Surg Tech    ANESTHETIC: 6 cc 1% Lidocaine with Epinephrine 1:100,000    SURGICAL PREP: Hibiclens, prepped by Terri Tamez PA-C    LOCATION: left upper leg    DEFECT SIZE: 2.0 x 2.7 cm    WOUND REPAIR/DISPOSITION:  After the patient's carcinoma had been completely removed with Mohs' Micrographic Surgery, a repair of the surgical defect was undertaken. The patient was returned to the operating suite where the area of left upper leg was prepped, draped, and anesthetized in the usual sterile fashion. The wound was widely undermined in all directions. Then, electrocoagulation was used to obtain meticulous hemostasis. 3-0 Vicryl buried vertical mattress sutures were placed into the subcutaneous and dermal plane to close the wound and samreen the cutaneous wound edge. Bilateral dog ears were identified and were removed by a standard Burow's triangle technique. The cutaneous wound edges were closed using interrupted 3-0 Prolene suture.    The patient tolerated the procedure well.    The area was cleaned and dressed appropriately and the patient was given wound care instructions, as well as appointment for follow-up evaluation. Patient declined pain medication.    LENGTH OF REPAIR: 5 cm    Vitals:    09/03/19 0957 09/03/19 1408   BP: 108/68 127/76   BP Location: Left arm Left arm   Patient Position: Sitting Sitting   BP Method: Small (Automatic) Small (Automatic)   Pulse: 69 66   Weight: 69.9 kg (154 lb)    Height: 5' 7" (1.702 m)          PROCEDURE: Mohs' Micrographic Surgery    INDICATION: Biopsy-proven skin cancer of cosmetically and functionally important areas, including head, neck, genital, hand, foot, or areas known for having difficulty in healing, such as the lower anterior legs. Tumors with aggressive clinical behavior (rapidly growing, greater than 1 cm in diameter). Tumor with ill-defined " borders.    REFERRING MD: Sri East M.D.    CASE NUMBER:     ANESTHETIC: 11 cc 0.5% Lidocaine with Epi 1:200,000 mixed 1:1 with 0.5% Bupivacaine    SURGICAL PREP: Hibiclens    SURGEON: Quentin Walker MD    ASSISTANTS: Terri Tamez PA-C, Jihan Montoya, Surg Tech and Darrell Azul, Surg Tech    PREOPERATIVE DIAGNOSIS: squamous cell carcinoma    POSTOPERATIVE DIAGNOSIS: squamous cell carcinoma    PATHOLOGIC DIAGNOSIS: squamous cell carcinoma- infiltrating, well differentiated, superficial, invasive    HISTOLOGY OF SPECIMENS IN FIRST STAGE:   Tumor Type: Tumor seen. Superficial squamous cell carcinoma: Proliferation of squamous cells exhibiting atypia and infiltrating within the superficial papillary dermis.  Invasive squamous cell carcinoma: Proliferation of squamous cells exhibiting atypia and infiltrating within the dermis.  Well-differentiated squamous cell carcinoma: Proliferation of squamous cells exhibiting atypia and infiltrating within the dermis.   Depth of Invasion: epidermis and dermis  Perineural Invasion: No    HISTOLOGY OF SPECIMENS IN SUBSEQUENT STAGES:  Tumor Type: Tumor seen. Same as in first stage.   Depth of Invasion: epidermis and dermis  Perineural Invasion: No    STAGES OF MOHS' SURGERY PERFORMED: 3    TUMOR-FREE PLANE ACHIEVED: Yes    HEMOSTASIS: electrocoagulation     SPECIMENS: 5 (2 in stage A, 2 in stage B and 1 in stage C)    LOCATION: left lower leg. Patient verified location.    INITIAL LESION SIZE: 1.1 x 1.2 cm    FINAL DEFECT SIZE: 2.1 x 2.3 cm    WOUND REPAIR/DISPOSITION: When the tumor was completely removed, repair options were discussed with the patient, and it was decided to let the wound heal by second intention. The patient tolerated the procedure well and will consider delayed reconstruction or repair if necessary.    The area was cleaned and dressed appropriately, and the patient was given wound care instructions to use Medihoney, as well as an appointment for follow-up  "evaluation. Patient declined pain medication.    Vitals:    09/03/19 0957 09/03/19 1408   BP: 108/68 127/76   BP Location: Left arm Left arm   Patient Position: Sitting Sitting   BP Method: Small (Automatic) Small (Automatic)   Pulse: 69 66   Weight: 69.9 kg (154 lb)    Height: 5' 7" (1.702 m)              "

## 2019-09-05 ENCOUNTER — OFFICE VISIT (OUTPATIENT)
Dept: DERMATOLOGY | Facility: CLINIC | Age: 74
End: 2019-09-05
Payer: COMMERCIAL

## 2019-09-05 ENCOUNTER — TELEPHONE (OUTPATIENT)
Dept: DERMATOLOGY | Facility: CLINIC | Age: 74
End: 2019-09-05

## 2019-09-05 DIAGNOSIS — Z09 POSTOP CHECK: Primary | ICD-10-CM

## 2019-09-05 PROCEDURE — 99999 PR PBB SHADOW E&M-EST. PATIENT-LVL III: ICD-10-PCS | Mod: PBBFAC,,, | Performed by: DERMATOLOGY

## 2019-09-05 PROCEDURE — 99024 PR POST-OP FOLLOW-UP VISIT: ICD-10-PCS | Mod: S$GLB,,, | Performed by: DERMATOLOGY

## 2019-09-05 PROCEDURE — 99999 PR PBB SHADOW E&M-EST. PATIENT-LVL III: CPT | Mod: PBBFAC,,, | Performed by: DERMATOLOGY

## 2019-09-05 PROCEDURE — 99024 POSTOP FOLLOW-UP VISIT: CPT | Mod: S$GLB,,, | Performed by: DERMATOLOGY

## 2019-09-05 NOTE — TELEPHONE ENCOUNTER
Contacted Mr. Wayne spoke with him about his concerns I asked if he had been applying pressure to the area that has been bleeding he stated that he had been wrapping it and once he took the wrap off that he noticed that it was bleeding. I asked if he wanted to come in to have Dr. Walker take a look at it and he said that he would like to do that once he leaves a meeting he confirmed his Appt today for 2:30pm

## 2019-09-05 NOTE — TELEPHONE ENCOUNTER
----- Message from Melanie Snowden sent at 9/5/2019  8:49 AM CDT -----  Contact: Self  Patient says one of the sites he had a procedure on is still bleeding a fair amount. Wants to know if he can be seen today for Dr. Walker to cauterize it.  (Only time he is unavailable today is 11-2)  [phone]

## 2019-09-05 NOTE — PROGRESS NOTES
74 y.o. male patient is here for wound check after surgery.    Patient reports bleeding from surgical site left lower leg. Patient states bleeding was at anterior edge of wound.    WOUND PE:  The left lower leg wound is healing. No signs of infection. Mild bleeding at posteromedial edge of wound, skin edges only. No bleeding at anterior edge of wound when manual pressure was applied.    IMPRESSION/PLAN:  Healing operative site from Mohs' surgery SCC, left lower leg s/p Mohs with 2nd intention healing, postop day # 3, now with mild skin bleeding at edge.  Etoh prep, 6 cc 1% lido plain, drapes, electrocoagulation, pressure bandage reapplied.  Continue wound care with compression socks.  Advised to keep leg elevated as much as possible for next 2 days.  If bleeding resumes, hold pressure x 20-30 minutes.  Cell phone number given to patient - call if any issues arise.    RTC:  In 2 weeks for suture removal left upper leg.

## 2019-09-13 ENCOUNTER — CLINICAL SUPPORT (OUTPATIENT)
Dept: DERMATOLOGY | Facility: CLINIC | Age: 74
End: 2019-09-13
Payer: COMMERCIAL

## 2019-09-13 ENCOUNTER — TELEPHONE (OUTPATIENT)
Dept: DERMATOLOGY | Facility: CLINIC | Age: 74
End: 2019-09-13

## 2019-09-13 DIAGNOSIS — C44.729 SQUAMOUS CELL CARCINOMA OF SKIN OF LEFT LOWER EXTREMITY: Primary | ICD-10-CM

## 2019-09-13 PROCEDURE — 99999 PR PBB SHADOW E&M-EST. PATIENT-LVL III: CPT | Mod: PBBFAC,,,

## 2019-09-13 PROCEDURE — 99999 PR PBB SHADOW E&M-EST. PATIENT-LVL III: ICD-10-PCS | Mod: PBBFAC,,,

## 2019-09-13 RX ORDER — DOXYCYCLINE HYCLATE 100 MG
100 TABLET ORAL EVERY 12 HOURS
Qty: 28 TABLET | Refills: 0 | Status: SHIPPED | OUTPATIENT
Start: 2019-09-13 | End: 2019-09-27

## 2019-09-13 NOTE — TELEPHONE ENCOUNTER
Pt concerned about redness around surgery site.  Sent me a message on my cell phone.  I advised him to come in to the clinic today, Friday 9/13 on my day off and that I would meet him here in clinic.    Saw him in clinic - L lower leg 2nd intent wound with allergic contact dermatitis to bandage, in shape of rectangle.  He has TAC ointment - advised to use that to surrounding area only.  Stop bandage.  Use medihoney, gauze and wrap only.    L thigh sutures intact with some erythema as well, no drainage or abscess.    Given both areas so inflamed, start doxycycline 100 mg bid x 2 weeks - erx today.  Disc photosensitivity and GI upset.    Sutures removed from left thigh today.    Pt was placed on derm surg nurse schedule.    Advised to call me directly on my cell if other issues arise.

## 2019-09-13 NOTE — PROGRESS NOTES
74 y.o. male patient is here for wound check after surgery.    Patient reports redness and slight pain to the left lower leg.    WOUND PE:  The left lower leg is healing well. Pt came in today with allergic contact dermatitis to bandage on left lower leg. Dr. Walker came in today to evaluate left lower leg. Pt has TAC ointment and will used on peripheral of wound. Pt was also put on Doxy 100mg bid x 2 weeks. Sutures on pt L upper leg was removed today.    IMPRESSION:  Healing operative site from Mohs' surgery SCC, left lower leg s/p Mohs with 2nd intention healing, postop day # 10.    PLAN:    Continue wound care.  Warm compresses tid x 3 days.  Start doxycycline 100 mg bid x 2 weeks   Daily SPF.  Regular skin checks.    RTC:  In 1 month.

## 2019-09-13 NOTE — PROGRESS NOTES
74 y.o. male patient is here for suture removal following Mohs' surgery.    Patient reports no problems with left upper leg.    WOUND PE:  The left upper leg sutures intact. Wound healing well. Good skin edges. No signs or symptoms of infection.      IMPRESSION:  Healing operative site from Mohs' surgery SCC, left upper leg s/p Mohs with CLC, postop day # 10.    PLAN:  Sutures removed today. Steri-strips applied.  Continue wound care.  Keep moist with Aquaphor.    RTC:  In 1 month.

## 2019-09-24 ENCOUNTER — TELEPHONE (OUTPATIENT)
Dept: OPHTHALMOLOGY | Facility: CLINIC | Age: 74
End: 2019-09-24

## 2019-09-24 NOTE — TELEPHONE ENCOUNTER
----- Message from Suzette Bhandari sent at 9/24/2019  8:22 AM CDT -----  Contact: Patient  Need Advice:      Reason For  Call: Patient would like to ask a question and get a call back      Communication Preference: [phone]      Additional Information

## 2019-09-27 ENCOUNTER — OFFICE VISIT (OUTPATIENT)
Dept: OPHTHALMOLOGY | Facility: CLINIC | Age: 74
End: 2019-09-27
Payer: COMMERCIAL

## 2019-09-27 DIAGNOSIS — H02.106 ECTROPION DUE TO LAXITY OF EYELID, LEFT: ICD-10-CM

## 2019-09-27 DIAGNOSIS — H00.15 CHALAZION LEFT LOWER EYELID: Primary | ICD-10-CM

## 2019-09-27 PROCEDURE — 92002 PR EYE EXAM, NEW PATIENT,INTERMED: ICD-10-PCS | Mod: S$GLB,,, | Performed by: OPHTHALMOLOGY

## 2019-09-27 PROCEDURE — 92002 INTRM OPH EXAM NEW PATIENT: CPT | Mod: S$GLB,,, | Performed by: OPHTHALMOLOGY

## 2019-09-27 PROCEDURE — 99999 PR PBB SHADOW E&M-EST. PATIENT-LVL III: CPT | Mod: PBBFAC,,, | Performed by: OPHTHALMOLOGY

## 2019-09-27 PROCEDURE — 99999 PR PBB SHADOW E&M-EST. PATIENT-LVL III: ICD-10-PCS | Mod: PBBFAC,,, | Performed by: OPHTHALMOLOGY

## 2019-09-27 RX ORDER — NEOMYCIN SULFATE, POLYMYXIN B SULFATE, AND DEXAMETHASONE 3.5; 10000; 1 MG/G; [USP'U]/G; MG/G
OINTMENT OPHTHALMIC NIGHTLY
Qty: 3.5 G | Refills: 3 | Status: SHIPPED | OUTPATIENT
Start: 2019-09-27 | End: 2019-10-17

## 2019-09-27 NOTE — Clinical Note
Can you call mr. mackey and see if he was able to get an appt with anna austin, and if he needs help pls call their office and help facilitate - I dictated referral letter thank.

## 2019-09-27 NOTE — LETTER
[facility] - Ophthalmology  [address]  [place] [address]  Phone: [phone]  Fax: [phone]   September 29, 2019    Eva Philippe MD  [facility]  [address]  [facility]  [facility] [address]    Patient: Alejandro Wayne   MR Number: [id number]   YOB: 1945   Date of Visit: 9/27/2019       Dear Dr. Philippe:    I am writing to refer you Mr. Alejandro Beasleyor evaluation. Here is my assessment and plan of care:    Chalazion left lower eyelid  - WC, maxtirol angus TID x 1 wk.    Ectropion due to laxity of eyelid, left  - eval with Dr. Philippe.      Below you will find my full exam findings. If you have questions, please do not hesitate to call me. I look forward to following Mr. Alejandro Wayne along with you.    Sincerely,        Nori Kendall MD       CC  No Recipients             Base Eye Exam     Visual Acuity (Snellen - Linear)       Right Left    Dist cc 20/30 -1 20/30 +1    Correction:  Glasses            Slit Lamp and Fundus Exam     External Exam       Right Left    External Normal Normal          Slit Lamp Exam       Right Left    Lids/Lashes Normal lower lid ectropion, chalazion LLL    Conjunctiva/Sclera White and quiet Injection    Cornea Clear Clear    Anterior Chamber Deep and quiet Deep and quiet    Iris Round and reactive Round and reactive    Lens Clear Clear

## 2019-09-29 NOTE — PROGRESS NOTES
HPI     Concerns About Ocular Health      Additional comments: irritation              Comments     Family friend.    No previous eye sx.    No eye meds.    1. HTN  2. BCC  3. SCC    Pt c/o irritation OS that started several wks ago + excessive rubbing in   the day.  Pt denies any other complaints.          Last edited by Mark Anthony Esteban on 9/27/2019 10:23 AM. (History)            Assessment /Plan     For exam results, see Encounter Report.    Chalazion left lower eyelid  - WC, maxtirol angus as below    Ectropion due to laxity of eyelid, left  - eval with Dr. Philippe.    Other orders  -     neomycin-polymyxin-dexamethasone (DEXACINE) 3.5 mg/g-10,000 unit/g-0.1 % Oint; Place into the left eye every evening. APPLY TO LEFT LOWER EYELID 3 TIMES A DAY for 20 days  Dispense: 3.5 g; Refill: 3

## 2019-10-09 ENCOUNTER — PROCEDURE VISIT (OUTPATIENT)
Dept: DERMATOLOGY | Facility: CLINIC | Age: 74
End: 2019-10-09
Payer: COMMERCIAL

## 2019-10-09 VITALS
DIASTOLIC BLOOD PRESSURE: 76 MMHG | SYSTOLIC BLOOD PRESSURE: 116 MMHG | BODY MASS INDEX: 24.17 KG/M2 | HEIGHT: 67 IN | WEIGHT: 154 LBS | HEART RATE: 73 BPM

## 2019-10-09 DIAGNOSIS — C44.722 SQUAMOUS CELL CARCINOMA OF SKIN OF RIGHT LOWER EXTREMITY: Primary | ICD-10-CM

## 2019-10-09 PROCEDURE — 99499 NO LOS: ICD-10-PCS | Mod: S$GLB,,, | Performed by: DERMATOLOGY

## 2019-10-09 PROCEDURE — 13120 CMPLX RPR S/A/L 1.1-2.5 CM: CPT | Mod: 59,S$GLB,, | Performed by: DERMATOLOGY

## 2019-10-09 PROCEDURE — 17313: ICD-10-PCS | Mod: S$GLB,,, | Performed by: DERMATOLOGY

## 2019-10-09 PROCEDURE — 99499 UNLISTED E&M SERVICE: CPT | Mod: S$GLB,,, | Performed by: DERMATOLOGY

## 2019-10-09 PROCEDURE — 13120 PR RECMPL WND SCALP,EXTR 1.1-2.5 CM: ICD-10-PCS | Mod: 59,S$GLB,, | Performed by: DERMATOLOGY

## 2019-10-09 PROCEDURE — 17313 MOHS 1 STAGE T/A/L: CPT | Mod: S$GLB,,, | Performed by: DERMATOLOGY

## 2019-10-09 NOTE — PROGRESS NOTES
PROCEDURE: Mohs' Micrographic Surgery    INDICATION: Biopsy-proven skin cancer of cosmetically and functionally important areas, including head, neck, genital, hand, foot, or areas known for having difficulty in healing, such as the lower anterior legs. Tumor with ill-defined borders. Tumor with aggressive histopathology. Aggressive histopathology including sclerosing, morpheaform/infiltrating, micronodular, superficial multicentric, poorly differentiated, basosquamous, or perineural invasion.    REFERRING MD: Sri East M.D.    CASE NUMBER:     ANESTHETIC: 7.5 cc 0.5% Lidocaine with Epi 1:200,000 mixed 1:1 with 0.5% Bupivacaine    SURGICAL PREP: Hibiclens    SURGEON: Quentin Walker MD    ASSISTANTS: Terri Tamez PA-C and Jihan Montoya Surg Tech    PREOPERATIVE DIAGNOSIS: squamous cell carcinoma    POSTOPERATIVE DIAGNOSIS: squamous cell carcinoma    PATHOLOGIC DIAGNOSIS: squamous cell carcinoma- infiltrating, well differentiated    HISTOLOGY OF SPECIMENS IN FIRST STAGE:   Tumor Type: No tumor seen.    STAGES OF MOHS' SURGERY PERFORMED: 1    TUMOR-FREE PLANE ACHIEVED: Yes    HEMOSTASIS: electrocoagulation     SPECIMENS: 2    LOCATION: right shin. Patient verified location.    INITIAL LESION SIZE: 0.5 x 0.6 cm    FINAL DEFECT SIZE: 1.0 x 1.2 cm    WOUND REPAIR/DISPOSITION: The patient tolerated Mohs' Micrographic Surgery for a squamous cell carcinoma very well. When the tumor was completely removed, a repair of the surgical defect was undertaken.      PROCEDURE: Complex Linear Repair    INDICATION: Status post Mohs' Micrographic Surgery for squamous cell carcinoma.    CASE NUMBER:     SURGEON: Quentin Walker MD    ASSISTANTS: Terri Tamez PA-C and Jihan Montoya Surg Tech    ANESTHETIC: 3 cc 1% Lidocaine with Epinephrine 1:100,000    SURGICAL PREP: Hibiclens, prepped by Jihan Montoya Surg Tech    LOCATION: right shin    DEFECT SIZE: 1.0 x 1.2 cm    WOUND REPAIR/DISPOSITION:  After the patient's  "carcinoma had been completely removed with Mohs' Micrographic Surgery, a repair of the surgical defect was undertaken. The patient was returned to the operating suite where the area of right shin was prepped, draped, and anesthetized in the usual sterile fashion. The wound was widely undermined in all directions. Then, electrocoagulation was used to obtain meticulous hemostasis. 3-0 Vicryl buried vertical mattress sutures were placed into the subcutaneous and dermal plane to close the wound and samreen the cutaneous wound edge. Bilateral dog ears were identified and were removed by a standard Burow's triangle technique. The cutaneous wound edges were closed using interrupted 3-0 Prolene suture.    The patient tolerated the procedure well.    The area was cleaned and dressed appropriately and the patient was given wound care instructions, as well as appointment for follow-up evaluation.    LENGTH OF REPAIR: 2.2 cm    Vitals:    10/09/19 1007 10/09/19 1143   BP: 112/73 116/76   BP Location:  Left arm   Patient Position:  Standing   BP Method:  Small (Automatic)   Pulse: 77 73   Weight: 69.9 kg (154 lb)    Height: 5' 7" (1.702 m)        NOTE: Patient is s/p Mohs of SCC L lower leg with second intention healing, postop week #5, now with hypergranulation tissue.   Silver nitrate applied.   Continue wound care - okay to just use aquaphor now.    "

## 2019-10-15 ENCOUNTER — TELEPHONE (OUTPATIENT)
Dept: GASTROENTEROLOGY | Facility: CLINIC | Age: 74
End: 2019-10-15

## 2019-10-15 DIAGNOSIS — K70.9 LIVER DISEASE DUE TO ALCOHOL: Primary | ICD-10-CM

## 2019-10-17 ENCOUNTER — LAB VISIT (OUTPATIENT)
Dept: LAB | Facility: HOSPITAL | Age: 74
End: 2019-10-17
Attending: INTERNAL MEDICINE
Payer: COMMERCIAL

## 2019-10-17 DIAGNOSIS — K70.9 LIVER DISEASE DUE TO ALCOHOL: ICD-10-CM

## 2019-10-17 LAB
ALBUMIN SERPL BCP-MCNC: 3.9 G/DL (ref 3.5–5.2)
ALP SERPL-CCNC: 72 U/L (ref 55–135)
ALT SERPL W/O P-5'-P-CCNC: 25 U/L (ref 10–44)
ANION GAP SERPL CALC-SCNC: 7 MMOL/L (ref 8–16)
AST SERPL-CCNC: 24 U/L (ref 10–40)
BASOPHILS # BLD AUTO: 0.08 K/UL (ref 0–0.2)
BASOPHILS NFR BLD: 1.2 % (ref 0–1.9)
BILIRUB DIRECT SERPL-MCNC: 0.3 MG/DL (ref 0.1–0.3)
BILIRUB SERPL-MCNC: 0.5 MG/DL (ref 0.1–1)
BUN SERPL-MCNC: 14 MG/DL (ref 8–23)
CALCIUM SERPL-MCNC: 9.2 MG/DL (ref 8.7–10.5)
CHLORIDE SERPL-SCNC: 105 MMOL/L (ref 95–110)
CO2 SERPL-SCNC: 27 MMOL/L (ref 23–29)
CREAT SERPL-MCNC: 0.8 MG/DL (ref 0.5–1.4)
DIFFERENTIAL METHOD: ABNORMAL
EOSINOPHIL # BLD AUTO: 0.2 K/UL (ref 0–0.5)
EOSINOPHIL NFR BLD: 3.4 % (ref 0–8)
ERYTHROCYTE [DISTWIDTH] IN BLOOD BY AUTOMATED COUNT: 13.2 % (ref 11.5–14.5)
EST. GFR  (AFRICAN AMERICAN): >60 ML/MIN/1.73 M^2
EST. GFR  (NON AFRICAN AMERICAN): >60 ML/MIN/1.73 M^2
FERRITIN SERPL-MCNC: 134 NG/ML (ref 20–300)
GLUCOSE SERPL-MCNC: 108 MG/DL (ref 70–110)
HCT VFR BLD AUTO: 39.5 % (ref 40–54)
HEPATITIS A ANTIBODY, IGG: NEGATIVE
HGB BLD-MCNC: 12.7 G/DL (ref 14–18)
IMM GRANULOCYTES # BLD AUTO: 0.02 K/UL (ref 0–0.04)
IMM GRANULOCYTES NFR BLD AUTO: 0.3 % (ref 0–0.5)
INR PPP: 1 (ref 0.8–1.2)
IRON SERPL-MCNC: 48 UG/DL (ref 45–160)
LYMPHOCYTES # BLD AUTO: 1 K/UL (ref 1–4.8)
LYMPHOCYTES NFR BLD: 14.1 % (ref 18–48)
MCH RBC QN AUTO: 28.5 PG (ref 27–31)
MCHC RBC AUTO-ENTMCNC: 32.2 G/DL (ref 32–36)
MCV RBC AUTO: 89 FL (ref 82–98)
MONOCYTES # BLD AUTO: 0.8 K/UL (ref 0.3–1)
MONOCYTES NFR BLD: 11.6 % (ref 4–15)
NEUTROPHILS # BLD AUTO: 4.8 K/UL (ref 1.8–7.7)
NEUTROPHILS NFR BLD: 69.4 % (ref 38–73)
NRBC BLD-RTO: 0 /100 WBC
PLATELET # BLD AUTO: 258 K/UL (ref 150–350)
PMV BLD AUTO: 9.6 FL (ref 9.2–12.9)
POTASSIUM SERPL-SCNC: 4.2 MMOL/L (ref 3.5–5.1)
PROT SERPL-MCNC: 7.2 G/DL (ref 6–8.4)
PROTHROMBIN TIME: 10.5 SEC (ref 9–12.5)
RBC # BLD AUTO: 4.45 M/UL (ref 4.6–6.2)
SATURATED IRON: 15 % (ref 20–50)
SODIUM SERPL-SCNC: 139 MMOL/L (ref 136–145)
TOTAL IRON BINDING CAPACITY: 326 UG/DL (ref 250–450)
TRANSFERRIN SERPL-MCNC: 220 MG/DL (ref 200–375)
TSH SERPL DL<=0.005 MIU/L-ACNC: 2.54 UIU/ML (ref 0.4–4)
WBC # BLD AUTO: 6.83 K/UL (ref 3.9–12.7)

## 2019-10-17 PROCEDURE — 80048 BASIC METABOLIC PNL TOTAL CA: CPT

## 2019-10-17 PROCEDURE — 82728 ASSAY OF FERRITIN: CPT

## 2019-10-17 PROCEDURE — 84443 ASSAY THYROID STIM HORMONE: CPT

## 2019-10-17 PROCEDURE — 86706 HEP B SURFACE ANTIBODY: CPT

## 2019-10-17 PROCEDURE — 83540 ASSAY OF IRON: CPT

## 2019-10-17 PROCEDURE — 85025 COMPLETE CBC W/AUTO DIFF WBC: CPT

## 2019-10-17 PROCEDURE — 86790 VIRUS ANTIBODY NOS: CPT

## 2019-10-17 PROCEDURE — 80076 HEPATIC FUNCTION PANEL: CPT

## 2019-10-17 PROCEDURE — 85610 PROTHROMBIN TIME: CPT

## 2019-10-18 DIAGNOSIS — K76.9 LIVER DISEASE: Primary | ICD-10-CM

## 2019-10-18 LAB
HBV SURFACE AB SER QL IA: NEGATIVE
HBV SURFACE AB SERPL IA-ACNC: <3 MIU/ML

## 2019-10-21 ENCOUNTER — HOSPITAL ENCOUNTER (OUTPATIENT)
Dept: RADIOLOGY | Facility: HOSPITAL | Age: 74
Discharge: HOME | End: 2019-10-21
Attending: INTERNAL MEDICINE
Payer: COMMERCIAL

## 2019-10-21 ENCOUNTER — HOSPITAL ENCOUNTER (OUTPATIENT)
Facility: HOSPITAL | Age: 74
Discharge: HOME | End: 2019-10-21
Attending: INTERNAL MEDICINE | Admitting: INTERNAL MEDICINE
Payer: COMMERCIAL

## 2019-10-21 ENCOUNTER — ANESTHESIA (OUTPATIENT)
Dept: ENDOSCOPY | Facility: HOSPITAL | Age: 74
End: 2019-10-21
Payer: COMMERCIAL

## 2019-10-21 ENCOUNTER — ANESTHESIA EVENT (OUTPATIENT)
Dept: ENDOSCOPY | Facility: HOSPITAL | Age: 74
End: 2019-10-21
Payer: COMMERCIAL

## 2019-10-21 ENCOUNTER — TELEPHONE (OUTPATIENT)
Dept: GASTROENTEROLOGY | Facility: CLINIC | Age: 74
End: 2019-10-21

## 2019-10-21 ENCOUNTER — TELEPHONE (OUTPATIENT)
Dept: ENDOSCOPY | Facility: HOSPITAL | Age: 74
End: 2019-10-21

## 2019-10-21 VITALS
WEIGHT: 160 LBS | TEMPERATURE: 98 F | SYSTOLIC BLOOD PRESSURE: 117 MMHG | HEART RATE: 67 BPM | DIASTOLIC BLOOD PRESSURE: 68 MMHG | HEIGHT: 67 IN | RESPIRATION RATE: 20 BRPM | OXYGEN SATURATION: 97 % | BODY MASS INDEX: 25.11 KG/M2

## 2019-10-21 DIAGNOSIS — D50.9 IRON DEFICIENCY ANEMIA: ICD-10-CM

## 2019-10-21 DIAGNOSIS — K57.32 DIVERTICULITIS OF SIGMOID COLON: Primary | ICD-10-CM

## 2019-10-21 DIAGNOSIS — K57.92 DIVERTICULITIS: Primary | ICD-10-CM

## 2019-10-21 DIAGNOSIS — K57.32 DIVERTICULITIS OF SIGMOID COLON: ICD-10-CM

## 2019-10-21 DIAGNOSIS — K57.32 SIGMOID DIVERTICULITIS: Primary | ICD-10-CM

## 2019-10-21 PROCEDURE — 63600175 PHARM REV CODE 636 W HCPCS: Performed by: INTERNAL MEDICINE

## 2019-10-21 PROCEDURE — 74177 CT ABD & PELVIS W/CONTRAST: CPT | Mod: 26,,, | Performed by: RADIOLOGY

## 2019-10-21 PROCEDURE — 43239 EGD BIOPSY SINGLE/MULTIPLE: CPT | Mod: 51,,, | Performed by: INTERNAL MEDICINE

## 2019-10-21 PROCEDURE — 45380 PR COLONOSCOPY,BIOPSY: ICD-10-PCS | Mod: ,,, | Performed by: INTERNAL MEDICINE

## 2019-10-21 PROCEDURE — 88305 TISSUE EXAM BY PATHOLOGIST: CPT | Mod: 26,,, | Performed by: PATHOLOGY

## 2019-10-21 PROCEDURE — 25500020 PHARM REV CODE 255: Performed by: INTERNAL MEDICINE

## 2019-10-21 PROCEDURE — 88305 TISSUE EXAM BY PATHOLOGIST: CPT | Performed by: PATHOLOGY

## 2019-10-21 PROCEDURE — 43239 PR EGD, FLEX, W/BIOPSY, SGL/MULTI: ICD-10-PCS | Mod: 51,,, | Performed by: INTERNAL MEDICINE

## 2019-10-21 PROCEDURE — 37000009 HC ANESTHESIA EA ADD 15 MINS: Performed by: INTERNAL MEDICINE

## 2019-10-21 PROCEDURE — 45380 COLONOSCOPY AND BIOPSY: CPT | Mod: ,,, | Performed by: INTERNAL MEDICINE

## 2019-10-21 PROCEDURE — 27201012 HC FORCEPS, HOT/COLD, DISP: Performed by: INTERNAL MEDICINE

## 2019-10-21 PROCEDURE — 88305 TISSUE SPECIMEN TO PATHOLOGY - SURGERY: ICD-10-PCS | Mod: 26,,, | Performed by: PATHOLOGY

## 2019-10-21 PROCEDURE — E9220 PRA ENDO ANESTHESIA: HCPCS | Mod: ,,, | Performed by: NURSE ANESTHETIST, CERTIFIED REGISTERED

## 2019-10-21 PROCEDURE — 43239 EGD BIOPSY SINGLE/MULTIPLE: CPT | Performed by: INTERNAL MEDICINE

## 2019-10-21 PROCEDURE — 82657 ENZYME CELL ACTIVITY: CPT | Performed by: PATHOLOGY

## 2019-10-21 PROCEDURE — 74177 CT ABDOMEN PELVIS WITH CONTRAST: ICD-10-PCS | Mod: 26,,, | Performed by: RADIOLOGY

## 2019-10-21 PROCEDURE — 63600175 PHARM REV CODE 636 W HCPCS: Performed by: NURSE ANESTHETIST, CERTIFIED REGISTERED

## 2019-10-21 PROCEDURE — 45380 COLONOSCOPY AND BIOPSY: CPT | Performed by: INTERNAL MEDICINE

## 2019-10-21 PROCEDURE — E9220 PRA ENDO ANESTHESIA: ICD-10-PCS | Mod: ,,, | Performed by: NURSE ANESTHETIST, CERTIFIED REGISTERED

## 2019-10-21 PROCEDURE — 25000003 PHARM REV CODE 250: Performed by: NURSE ANESTHETIST, CERTIFIED REGISTERED

## 2019-10-21 PROCEDURE — 74177 CT ABD & PELVIS W/CONTRAST: CPT | Mod: TC

## 2019-10-21 PROCEDURE — 37000008 HC ANESTHESIA 1ST 15 MINUTES: Performed by: INTERNAL MEDICINE

## 2019-10-21 RX ORDER — LIDOCAINE HCL/PF 100 MG/5ML
SYRINGE (ML) INTRAVENOUS
Status: DISCONTINUED | OUTPATIENT
Start: 2019-10-21 | End: 2019-10-21

## 2019-10-21 RX ORDER — SODIUM CHLORIDE 9 MG/ML
INJECTION, SOLUTION INTRAVENOUS CONTINUOUS
Status: DISCONTINUED | OUTPATIENT
Start: 2019-10-21 | End: 2019-10-21 | Stop reason: HOSPADM

## 2019-10-21 RX ORDER — AMOXICILLIN AND CLAVULANATE POTASSIUM 875; 125 MG/1; MG/1
1 TABLET, FILM COATED ORAL EVERY 12 HOURS
Qty: 20 TABLET | Refills: 0 | Status: SHIPPED | OUTPATIENT
Start: 2019-10-21 | End: 2019-10-31

## 2019-10-21 RX ORDER — SODIUM CHLORIDE 0.9 % (FLUSH) 0.9 %
10 SYRINGE (ML) INJECTION
Status: DISCONTINUED | OUTPATIENT
Start: 2019-10-21 | End: 2019-10-21 | Stop reason: HOSPADM

## 2019-10-21 RX ORDER — GLYCOPYRROLATE 0.2 MG/ML
INJECTION INTRAMUSCULAR; INTRAVENOUS
Status: DISCONTINUED | OUTPATIENT
Start: 2019-10-21 | End: 2019-10-21

## 2019-10-21 RX ORDER — PROPOFOL 10 MG/ML
VIAL (ML) INTRAVENOUS CONTINUOUS PRN
Status: DISCONTINUED | OUTPATIENT
Start: 2019-10-21 | End: 2019-10-21

## 2019-10-21 RX ORDER — PROPOFOL 10 MG/ML
VIAL (ML) INTRAVENOUS
Status: DISCONTINUED | OUTPATIENT
Start: 2019-10-21 | End: 2019-10-21

## 2019-10-21 RX ADMIN — PROPOFOL 150 MCG/KG/MIN: 10 INJECTION, EMULSION INTRAVENOUS at 07:10

## 2019-10-21 RX ADMIN — GLYCOPYRROLATE 0.2 MG: 0.2 INJECTION, SOLUTION INTRAMUSCULAR; INTRAVENOUS at 07:10

## 2019-10-21 RX ADMIN — SODIUM CHLORIDE: 0.9 INJECTION, SOLUTION INTRAVENOUS at 08:10

## 2019-10-21 RX ADMIN — IOHEXOL 30 ML: 350 INJECTION, SOLUTION INTRAVENOUS at 12:10

## 2019-10-21 RX ADMIN — PROPOFOL 80 MG: 10 INJECTION, EMULSION INTRAVENOUS at 07:10

## 2019-10-21 RX ADMIN — IOHEXOL 15 ML: 350 INJECTION, SOLUTION INTRAVENOUS at 10:10

## 2019-10-21 RX ADMIN — LIDOCAINE HYDROCHLORIDE 100 MG: 20 INJECTION, SOLUTION INTRAVENOUS at 07:10

## 2019-10-21 RX ADMIN — SODIUM CHLORIDE: 0.9 INJECTION, SOLUTION INTRAVENOUS at 07:10

## 2019-10-21 RX ADMIN — IOHEXOL 15 ML: 350 INJECTION, SOLUTION INTRAVENOUS at 11:10

## 2019-10-21 RX ADMIN — IOHEXOL 75 ML: 350 INJECTION, SOLUTION INTRAVENOUS at 12:10

## 2019-10-21 NOTE — H&P
[facility]  History & Physical    Subjective:      Chief Complaint/Reason for Admission:     EGD and colonoscopy    Alejandro Wayne is a 74 y.o. male.    Past Medical History:   Diagnosis Date    Basal cell carcinoma     BPH (benign prostatic hyperplasia)     Colon polyp     Hyperlipidemia     Hypertension     Liver cyst 1/11/2016    Prostate nodule     Squamous cell carcinoma      Past Surgical History:   Procedure Laterality Date    APPENDECTOMY      COLONOSCOPY N/A 6/22/2016    Procedure: COLONOSCOPY;  Surgeon: Marco Mathews MD;  Location: [facility] (4TH FLR);  Service: Endoscopy;  Laterality: N/A;    COLONOSCOPY W/ POLYPECTOMY      ENDOSCOPIC ULTRASOUND OF UPPER GASTROINTESTINAL TRACT N/A 6/24/2019    Procedure: ULTRASOUND, UPPER GI TRACT, ENDOSCOPIC;  Surgeon: Jeremy Saleem MD;  Location: [facility] (2ND FLR);  Service: Endoscopy;  Laterality: N/A;  For evaluation of low fecal elastase and loose stools and history of nonspecific finding of the EUS of the pancreas    ESOPHAGOGASTRODUODENOSCOPY      ESOPHAGOGASTRODUODENOSCOPY N/A 6/24/2019    Procedure: EGD (ESOPHAGOGASTRODUODENOSCOPY);  Surgeon: Jeremy Saleem MD;  Location: [facility] (2ND FLR);  Service: Endoscopy;  Laterality: N/A;  For evaluation of iron deficiency anemia    HERNIA REPAIR      KNEE ARTHROSCOPY W/ DEBRIDEMENT      TONSILLECTOMY, ADENOIDECTOMY      UPPER ENDOSCOPIC ULTRASOUND W/ FNA      VASECTOMY       Family History   Problem Relation Age of Onset    Cancer Mother         melanoma    Cancer Father         skin cancer    Celiac disease Neg Hx     Cirrhosis Neg Hx     Colon cancer Neg Hx     Colon polyps Neg Hx     Crohn's disease Neg Hx     Esophageal cancer Neg Hx     Inflammatory bowel disease Neg Hx     Irritable bowel syndrome Neg Hx     Liver cancer Neg Hx     Rectal cancer Neg Hx     Stomach cancer Neg Hx     Ulcerative colitis Neg Hx      Social History     Tobacco Use    Smoking  status: Former Smoker     Packs/day: 0.50     Years: 4.00     Pack years: 2.00     Types: Cigarettes     Last attempt to quit: 1966     Years since quittin.4    Smokeless tobacco: Never Used    Tobacco comment: as teenager   Substance Use Topics    Alcohol use: Yes     Alcohol/week: 14.0 standard drinks     Types: 14 Glasses of wine per week     Comment: social    Drug use: No       PTA Medications   Medication Sig    amLODIPine (NORVASC) 5 MG tablet Take 1 tablet (5 mg total) by mouth once daily.    ascorbic acid (VITAMIN C) 1000 MG tablet Take 1,000 mg by mouth once daily.    aspirin (ECOTRIN) 81 MG EC tablet Take 81 mg by mouth once daily.    cetirizine (ZYRTEC) 10 MG tablet Take 10 mg by mouth once daily.    diphenoxylate-atropine 2.5-0.025 mg (LOMOTIL) 2.5-0.025 mg per tablet 1-2 pills qid prn diarrhea    eszopiclone (LUNESTA) 2 MG Tab TAKE 1 TABLET BY MOUTH EVERY NIGHT AT BEDTIME AS NEEDED FOR SLEEP    fluorouracil (EFUDEX) 5 % cream     lipase-protease-amylase (CREON) 36,000-114,000- 180,000 unit CpDR Take 3 capsules by mouth 3 (three) times daily with meals. One capsule with every snack.  Not to exceed 12 pills a day.    multivitamin (MULTIVITAMIN) per tablet Take 1 tablet by mouth once daily.    rosuvastatin (CRESTOR) 10 MG tablet Take 1 tablet by mouth once daily.    tadalafil (CIALIS) 20 MG Tab Take 1 tablet (20 mg total) by mouth daily as needed.    triamcinolone acetonide 0.1% (KENALOG) 0.1 % ointment APPLY A SMALL AMOUNT TO THE AFFECTED AREA QD UTD    vitamin E 1000 UNIT capsule Take 1,000 Units by mouth once daily.     Review of patient's allergies indicates:   Allergen Reactions    Meloxicam      iarrhea        Review of Systems   Constitutional: Negative for chills, fever and weight loss.   Respiratory: Negative for shortness of breath and wheezing.    Cardiovascular: Negative for chest pain.   Gastrointestinal: Positive for diarrhea. Negative for abdominal pain, blood in  stool, constipation, heartburn, melena, nausea and vomiting.       Objective:      Vital Signs (Most Recent)       Vital Signs Range (Last 24H):  BP: ()/()   Arterial Line BP: ()/()     Physical Exam   Constitutional: He is oriented to person, place, and time. He appears well-developed and well-nourished.   Cardiovascular: Normal rate.   Pulmonary/Chest: Effort normal.   Neurological: He is alert and oriented to person, place, and time.   Skin: Skin is warm and dry.   Psychiatric: He has a normal mood and affect. His behavior is normal. Judgment and thought content normal.         Assessment:      There are no hospital problems to display for this patient.      Plan:        EGD and colonoscopy for loose stools/diarrhea and iron deficiency anemia.

## 2019-10-21 NOTE — ANESTHESIA POSTPROCEDURE EVALUATION
Anesthesia Post Evaluation    Patient: Alejandro Wayne    Procedure(s) Performed: Procedure(s) (LRB):  EGD (ESOPHAGOGASTRODUODENOSCOPY) (N/A)  COLONOSCOPY (N/A)    Final Anesthesia Type: general  Patient location during evaluation: PACU  Patient participation: Yes- Able to Participate  Level of consciousness: awake and alert  Post-procedure vital signs: reviewed and stable  Pain management: adequate  Airway patency: patent  PONV status at discharge: No PONV  Anesthetic complications: no      Cardiovascular status: blood pressure returned to baseline  Respiratory status: unassisted  Hydration status: euvolemic  Follow-up not needed.          Vitals Value Taken Time   /68 10/21/2019  8:40 AM   Temp 36.6 °C (97.9 °F) 10/21/2019  8:10 AM   Pulse 67 10/21/2019  8:40 AM   Resp 20 10/21/2019  8:40 AM   SpO2 97 % 10/21/2019  8:40 AM         No case tracking events are documented in the log.      Pain/Chetan Score: Chetan Score: 10 (10/21/2019  8:40 AM)

## 2019-10-21 NOTE — PLAN OF CARE
Patient instructed to go to outpatient lab and CT Scan afterwards. IV left in place. Verbalized understanding.

## 2019-10-21 NOTE — DISCHARGE INSTRUCTIONS

## 2019-10-21 NOTE — TELEPHONE ENCOUNTER
"----- Message from Marco Mathews MD sent at 10/18/2019  6:10 PM CDT -----  Ira  please tell patient that their Hepatitis A, B and C labs are negative but they have "No" immunity to them either.      There is currently No vaccination yet for Hepatitis C.    There is vaccinations for Hepatitis A and B,  and Recommend the Hepatitis A and B vaccination series.        Its a three part vaccination series:   Day 1, then again in 1 month, and then again in 6 months from first one.      Orders were  placed.    "

## 2019-10-21 NOTE — TELEPHONE ENCOUNTER
Called pt, no answer.  LM notifying pt of his f/u appt.  Left main number in case he needs to reschedule.

## 2019-10-21 NOTE — TRANSFER OF CARE
"Anesthesia Transfer of Care Note    Patient: Alejandro Wayne    Procedure(s) Performed: Procedure(s) (LRB):  EGD (ESOPHAGOGASTRODUODENOSCOPY) (N/A)  COLONOSCOPY (N/A)    Patient location: GI    Anesthesia Type: general    Transport from OR: Transported from OR on 2-3 L/min O2 by NC with adequate spontaneous ventilation    Post pain: adequate analgesia    Post assessment: no apparent anesthetic complications and tolerated procedure well    Post vital signs: stable    Level of consciousness: awake, alert and oriented    Nausea/Vomiting: no nausea/vomiting    Complications: none    Transfer of care protocol was followed      Last vitals:   Visit Vitals  /66 (BP Location: Left arm, Patient Position: Lying)   Pulse 74   Temp 36.8 °C (98.2 °F) (Temporal)   Resp 16   Ht 5' 7" (1.702 m)   Wt 72.6 kg (160 lb)   SpO2 95%   BMI 25.06 kg/m²     "

## 2019-10-21 NOTE — [employer]OVATION PATIENT INSTRUCTIONS
Discharge Summary/Instructions after an Endoscopic Procedure  Patient Name: Alejandro Wayne  Patient MRN: [id number]  Patient YOB: 1945  Monday, October 21, 2019  Marco Mathews MD  RESTRICTIONS:  During your procedure today, you received medications for sedation.  These   medications may affect your judgment, balance and coordination.  Therefore,   for 24 hours, you have the following restrictions:   - DO NOT drive a car, operate machinery, make legal/financial decisions,   sign important papers or drink alcohol.    ACTIVITY:  Today: no heavy lifting, straining or running due to procedural   sedation/anesthesia.  The following day: return to full activity including work.  DIET:  Eat and drink normally unless instructed otherwise.     TREATMENT FOR COMMON SIDE EFFECTS:  - Mild abdominal pain, nausea, belching, bloating or excessive gas:  rest,   eat lightly and use a heating pad.  - Sore Throat: treat with throat lozenges and/or gargle with warm salt   water.  - Because air was used during the procedure, expelling large amounts of air   from your rectum or belching is normal.  - If a bowel prep was taken, you may not have a bowel movement for 1-3 days.    This is normal.  SYMPTOMS TO WATCH FOR AND REPORT TO YOUR PHYSICIAN:  1. Abdominal pain or bloating, other than gas cramps.  2. Chest pain.  3. Back pain.  4. Signs of infection such as: chills or fever occurring within 24 hours   after the procedure.  5. Rectal bleeding, which would show as bright red, maroon, or black stools.   (A tablespoon of blood from the rectum is not serious, especially if   hemorrhoids are present.)  6. Vomiting.  7. Weakness or dizziness.  GO DIRECTLY TO THE NEAREST EMERGENCY ROOM IF YOU HAVE ANY OF THE FOLLOWING:      Difficulty breathing              Chills and/or fever over 101 F   Persistent vomiting and/or vomiting blood   Severe abdominal pain   Severe chest pain   Black, tarry stools   Bleeding- more than one  tablespoon   Any other symptom or condition that you feel may need urgent attention  Your doctor recommends these additional instructions:  If any biopsies were taken, your doctors clinic will contact you in 1 to 2   weeks with any results.  - Discharge patient to home.   - Await pathology results.   - Telephone endoscopist for pathology results in 2 weeks.   - Return to GI clinic at the next available appointment.   - The findings and recommendations were discussed with the patient.   - The findings and recommendations were discussed with the patient's family.     - Perform a colonoscopy today.  For questions, problems or results please call your physician - Marco Mathews MD at Work:  [phone].  [facility], EMERGENCY ROOM PHONE NUMBER: [phone]  IF A COMPLICATION OR EMERGENCY SITUATION ARISES AND YOU ARE UNABLE TO REACH   YOUR PHYSICIAN - GO DIRECTLY TO THE EMERGENCY ROOM.  Marco Mathews MD  10/21/2019 8:46:56 AM  This report has been verified and signed electronically.  [employer]

## 2019-10-21 NOTE — ANESTHESIA PREPROCEDURE EVALUATION
10/21/2019  Alejandro Wayne is a 74 y.o., male.    Past Medical History:   Diagnosis Date    Basal cell carcinoma     BPH (benign prostatic hyperplasia)     Colon polyp     Hyperlipidemia     Hypertension     Liver cyst 1/11/2016    Prostate nodule     Squamous cell carcinoma      Past Surgical History:   Procedure Laterality Date    APPENDECTOMY      COLONOSCOPY N/A 6/22/2016    Procedure: COLONOSCOPY;  Surgeon: Marco Mathews MD;  Location: [facility] (4TH FLR);  Service: Endoscopy;  Laterality: N/A;    COLONOSCOPY W/ POLYPECTOMY      ENDOSCOPIC ULTRASOUND OF UPPER GASTROINTESTINAL TRACT N/A 6/24/2019    Procedure: ULTRASOUND, UPPER GI TRACT, ENDOSCOPIC;  Surgeon: Jeremy Saleem MD;  Location: [facility] (2ND FLR);  Service: Endoscopy;  Laterality: N/A;  For evaluation of low fecal elastase and loose stools and history of nonspecific finding of the EUS of the pancreas    ESOPHAGOGASTRODUODENOSCOPY      ESOPHAGOGASTRODUODENOSCOPY N/A 6/24/2019    Procedure: EGD (ESOPHAGOGASTRODUODENOSCOPY);  Surgeon: Jeremy Saleem MD;  Location: [facility] (2ND FLR);  Service: Endoscopy;  Laterality: N/A;  For evaluation of iron deficiency anemia    HERNIA REPAIR      KNEE ARTHROSCOPY W/ DEBRIDEMENT      TONSILLECTOMY, ADENOIDECTOMY      UPPER ENDOSCOPIC ULTRASOUND W/ FNA      VASECTOMY         Anesthesia Evaluation    I have reviewed the Patient Summary Reports.     I have reviewed the Medications.     Review of Systems  Anesthesia Hx:  No problems with previous Anesthesia Denies Hx of Anesthetic complications  Neg history of prior surgery. Denies Family Hx of Anesthesia complications.   Denies Personal Hx of Anesthesia complications.   Social:  Former Smoker    Hematology/Oncology:  Hematology Normal   Oncology Normal     EENT/Dental:EENT/Dental Normal   Cardiovascular:   Exercise tolerance: good  Hypertension ECG has been reviewed.    Pulmonary:  Pulmonary Normal    Renal/:  Renal/ Normal     Hepatic/GI:  Hepatic/GI Normal    Musculoskeletal:  Musculoskeletal Normal    Neurological:  Neurology Normal    Endocrine:  Endocrine Normal    Dermatological:  Skin Normal    Psych:  Psychiatric Normal           Physical Exam  General:  Well nourished    Airway/Jaw/Neck:  Airway Findings: Mouth Opening: Normal Tongue: Normal  General Airway Assessment: Adult  Mallampati: II  TM Distance: Normal, at least 6 cm  Jaw/Neck Findings:  Micrognathia: Negative Mandibular Fracture: Negative    Neck ROM: Normal ROM  Neck Findings: Normal     Dental:  Dental Findings: In tact   Chest/Lungs:  Chest/Lungs Findings: Clear to auscultation, Normal Respiratory Rate     Heart/Vascular:  Heart Findings: Rate: Normal  Rhythm: Regular Rhythm  Sounds: Normal  Heart murmur: negative Vascular Findings: Normal    Abdomen:  Abdomen Findings:  Normal, Soft, Nontender     Musculoskeletal:  Musculoskeletal Findings: Normal   Skin:  Skin Findings: Normal    Mental Status:  Mental Status Findings:  Cooperative, Alert and Oriented         Anesthesia Plan  Type of Anesthesia, risks & benefits discussed:  Anesthesia Type:  general  Patient's Preference:   Intra-op Monitoring Plan: standard ASA monitors  Intra-op Monitoring Plan Comments:   Post Op Pain Control Plan:   Post Op Pain Control Plan Comments:   Induction:   IV  Beta Blocker:  Patient is not currently on a Beta-Blocker (No further documentation required).       Informed Consent: Patient understands risks and agrees with Anesthesia plan.  Questions answered. Anesthesia consent signed with patient.  ASA Score: 2     Day of Surgery Review of History & Physical:    H&P update referred to the provider.         Ready For Surgery From Anesthesia Perspective.

## 2019-10-21 NOTE — PROVATION PATIENT INSTRUCTIONS
Discharge Summary/Instructions after an Endoscopic Procedure  Patient Name: Alejandro Wayne  Patient MRN: [id number]  Patient YOB: 1945  Monday, October 21, 2019  Marco Mathews MD  RESTRICTIONS:  During your procedure today, you received medications for sedation.  These   medications may affect your judgment, balance and coordination.  Therefore,   for 24 hours, you have the following restrictions:   - DO NOT drive a car, operate machinery, make legal/financial decisions,   sign important papers or drink alcohol.    ACTIVITY:  Today: no heavy lifting, straining or running due to procedural   sedation/anesthesia.  The following day: return to full activity including work.  DIET:  Eat and drink normally unless instructed otherwise.     TREATMENT FOR COMMON SIDE EFFECTS:  - Mild abdominal pain, nausea, belching, bloating or excessive gas:  rest,   eat lightly and use a heating pad.  - Sore Throat: treat with throat lozenges and/or gargle with warm salt   water.  - Because air was used during the procedure, expelling large amounts of air   from your rectum or belching is normal.  - If a bowel prep was taken, you may not have a bowel movement for 1-3 days.    This is normal.  SYMPTOMS TO WATCH FOR AND REPORT TO YOUR PHYSICIAN:  1. Abdominal pain or bloating, other than gas cramps.  2. Chest pain.  3. Back pain.  4. Signs of infection such as: chills or fever occurring within 24 hours   after the procedure.  5. Rectal bleeding, which would show as bright red, maroon, or black stools.   (A tablespoon of blood from the rectum is not serious, especially if   hemorrhoids are present.)  6. Vomiting.  7. Weakness or dizziness.  GO DIRECTLY TO THE NEAREST EMERGENCY ROOM IF YOU HAVE ANY OF THE FOLLOWING:      Difficulty breathing              Chills and/or fever over 101 F   Persistent vomiting and/or vomiting blood   Severe abdominal pain   Severe chest pain   Black, tarry stools   Bleeding- more than one  tablespoon   Any other symptom or condition that you feel may need urgent attention  Your doctor recommends these additional instructions:  If any biopsies were taken, your doctors clinic will contact you in 1 to 2   weeks with any results.  - Discharge patient to home. Keep NPO. Keep well hydrated after CT scan   today.  - Check BMP today today.   - Perform a CT scan (computed tomography) of abdomen with contrast and   pelvis with contrast today. Orders placed.  - Await pathology results.   - Telephone endoscopist for pathology results in 2 weeks.   - Augmentin (amoxicillin/clavulanate) 875 mg by mouth every 12 hours for 10   days. (Rx sent to your pharmacy)  - Return to GI clinic in 1 week.   - Repeat colonoscopy in 8 weeks to check healing and per protocol.   - The findings and recommendations were discussed with the patient.   - The findings and recommendations were discussed with the patient's family.     - Refer to a colo-rectal surgeon at the next available appointment.  For questions, problems or results please call your physician - Marco Mathews MD at Work:  [phone].  [facility], EMERGENCY ROOM PHONE NUMBER: [phone]  IF A COMPLICATION OR EMERGENCY SITUATION ARISES AND YOU ARE UNABLE TO REACH   YOUR PHYSICIAN - GO DIRECTLY TO THE EMERGENCY ROOM.  Marco Mathews MD  10/21/2019 8:26:11 AM  This report has been verified and signed electronically.  [employer]

## 2019-10-22 ENCOUNTER — OFFICE VISIT (OUTPATIENT)
Dept: SURGERY | Facility: CLINIC | Age: 74
End: 2019-10-22
Payer: COMMERCIAL

## 2019-10-22 VITALS
HEART RATE: 80 BPM | HEIGHT: 67 IN | SYSTOLIC BLOOD PRESSURE: 132 MMHG | WEIGHT: 164.88 LBS | BODY MASS INDEX: 25.88 KG/M2 | DIASTOLIC BLOOD PRESSURE: 78 MMHG

## 2019-10-22 DIAGNOSIS — K57.32 DIVERTICULITIS OF LARGE INTESTINE WITHOUT PERFORATION OR ABSCESS WITHOUT BLEEDING: Primary | ICD-10-CM

## 2019-10-22 PROCEDURE — 3078F DIAST BP <80 MM HG: CPT | Mod: CPTII,S$GLB,, | Performed by: COLON & RECTAL SURGERY

## 2019-10-22 PROCEDURE — 99999 PR PBB SHADOW E&M-EST. PATIENT-LVL III: ICD-10-PCS | Mod: PBBFAC,,, | Performed by: COLON & RECTAL SURGERY

## 2019-10-22 PROCEDURE — 3075F SYST BP GE 130 - 139MM HG: CPT | Mod: CPTII,S$GLB,, | Performed by: COLON & RECTAL SURGERY

## 2019-10-22 PROCEDURE — 3075F PR MOST RECENT SYSTOLIC BLOOD PRESS GE 130-139MM HG: ICD-10-PCS | Mod: CPTII,S$GLB,, | Performed by: COLON & RECTAL SURGERY

## 2019-10-22 PROCEDURE — 3078F PR MOST RECENT DIASTOLIC BLOOD PRESSURE < 80 MM HG: ICD-10-PCS | Mod: CPTII,S$GLB,, | Performed by: COLON & RECTAL SURGERY

## 2019-10-22 PROCEDURE — 1101F PT FALLS ASSESS-DOCD LE1/YR: CPT | Mod: CPTII,S$GLB,, | Performed by: COLON & RECTAL SURGERY

## 2019-10-22 PROCEDURE — 99999 PR PBB SHADOW E&M-EST. PATIENT-LVL III: CPT | Mod: PBBFAC,,, | Performed by: COLON & RECTAL SURGERY

## 2019-10-22 PROCEDURE — 99203 OFFICE O/P NEW LOW 30 MIN: CPT | Mod: S$GLB,,, | Performed by: COLON & RECTAL SURGERY

## 2019-10-22 PROCEDURE — 99203 PR OFFICE/OUTPT VISIT, NEW, LEVL III, 30-44 MIN: ICD-10-PCS | Mod: S$GLB,,, | Performed by: COLON & RECTAL SURGERY

## 2019-10-22 PROCEDURE — 1101F PR PT FALLS ASSESS DOC 0-1 FALLS W/OUT INJ PAST YR: ICD-10-PCS | Mod: CPTII,S$GLB,, | Performed by: COLON & RECTAL SURGERY

## 2019-10-22 NOTE — LETTER
October 22, 2019      Marco Mathews MD  [address]  [facility] [address]           [facility]  [address]  [place] [address]  Phone: [phone]          Patient: Alejandro Wayne   MR Number: [id number]   YOB: 1945   Date of Visit: 10/22/2019       Dear Dr. Marco Mathews:    Thank you for referring Alejandro Wayne to me for evaluation. Attached you will find relevant portions of my assessment and plan of care.    If you have questions, please do not hesitate to call me. I look forward to following Alejandro Wayne along with you.    Sincerely,    LIZABETH Hall MD    Enclosure  CC:  No Recipients    If you would like to receive this communication electronically, please contact [email or web address] or [phone] to request more information on [employer] access.    For providers and/or their staff who would like to refer a patient to [facility], please contact us through our one-stop-shop provider referral line, [facility], at [phone].    If you feel you have received this communication in error or would no longer like to receive these types of communications, please e-mail [email or web address]

## 2019-10-22 NOTE — PROGRESS NOTES
CRS Office Visit History and Physical    Referring Md:   Marco Mathews Md  [address] [place] [address]    SUBJECTIVE:     Chief Complaint:  Abnormal colonoscopy    History of Present Illness:  Patient is a 74 y.o. male presents with history of abnormal colonoscopy.  He was noted to have evidence of edema, erythema and purulence emanating from what appeared to be a diverticula.  He has had multiple attacks of diverticulitis in the distant past.  He would have severe left lower quadrant abdominal pain and fevers.  He states that over the past several months he has had some intermittent fevers but no abdominal pain. Since starting Creon for pancreatic insufficiency his bowel movements have become more regular, formed to soft without blood.    Last Colonoscopy: 10-      Past Medical History:   Diagnosis Date    Basal cell carcinoma     BPH (benign prostatic hyperplasia)     Colon polyp     Hyperlipidemia     Hypertension     Liver cyst 1/11/2016    Prostate nodule     Squamous cell carcinoma        Past Surgical History:   Procedure Laterality Date    APPENDECTOMY      COLONOSCOPY N/A 6/22/2016    Procedure: COLONOSCOPY;  Surgeon: Marco Mathews MD;  Location: [facility] (4TH FLR);  Service: Endoscopy;  Laterality: N/A;    COLONOSCOPY N/A 10/21/2019    Procedure: COLONOSCOPY;  Surgeon: Marco Mathews MD;  Location: [facility] (4TH FLR);  Service: Endoscopy;  Laterality: N/A;  First case of the day with me next available     COLONOSCOPY W/ POLYPECTOMY      ENDOSCOPIC ULTRASOUND OF UPPER GASTROINTESTINAL TRACT N/A 6/24/2019    Procedure: ULTRASOUND, UPPER GI TRACT, ENDOSCOPIC;  Surgeon: Jeremy Saleem MD;  Location: [facility] (2ND FLR);  Service: Endoscopy;  Laterality: N/A;  For evaluation of low fecal elastase and loose stools and history of nonspecific finding of the EUS of the pancreas    ESOPHAGOGASTRODUODENOSCOPY      ESOPHAGOGASTRODUODENOSCOPY N/A 6/24/2019    Procedure:  EGD (ESOPHAGOGASTRODUODENOSCOPY);  Surgeon: Jeremy Saleem MD;  Location: [facility] (2ND FLR);  Service: Endoscopy;  Laterality: N/A;  For evaluation of iron deficiency anemia    ESOPHAGOGASTRODUODENOSCOPY N/A 10/21/2019    Procedure: EGD (ESOPHAGOGASTRODUODENOSCOPY);  Surgeon: Marco Mathews MD;  Location: [facility] (4TH FLR);  Service: Endoscopy;  Laterality: N/A;  First case of the day with me next available     HERNIA REPAIR      KNEE ARTHROSCOPY W/ DEBRIDEMENT      TONSILLECTOMY, ADENOIDECTOMY      UPPER ENDOSCOPIC ULTRASOUND W/ FNA      VASECTOMY         Review of patient's allergies indicates:   Allergen Reactions    Meloxicam      iarrhea       Current Outpatient Medications on File Prior to Visit   Medication Sig Dispense Refill    amLODIPine (NORVASC) 5 MG tablet Take 1 tablet (5 mg total) by mouth once daily. 90 tablet 3    amoxicillin-clavulanate 875-125mg (AUGMENTIN) 875-125 mg per tablet Take 1 tablet by mouth every 12 (twelve) hours. for 10 days 20 tablet 0    ascorbic acid (VITAMIN C) 1000 MG tablet Take 1,000 mg by mouth once daily.      aspirin (ECOTRIN) 81 MG EC tablet Take 81 mg by mouth once daily.      cetirizine (ZYRTEC) 10 MG tablet Take 10 mg by mouth once daily.      diphenoxylate-atropine 2.5-0.025 mg (LOMOTIL) 2.5-0.025 mg per tablet 1-2 pills qid prn diarrhea 50 tablet 0    eszopiclone (LUNESTA) 2 MG Tab TAKE 1 TABLET BY MOUTH EVERY NIGHT AT BEDTIME AS NEEDED FOR SLEEP 30 tablet 0    fluorouracil (EFUDEX) 5 % cream       lipase-protease-amylase (CREON) 36,000-114,000- 180,000 unit CpDR Take 3 capsules by mouth 3 (three) times daily with meals. One capsule with every snack.  Not to exceed 12 pills a day. 810 capsule 3    multivitamin (MULTIVITAMIN) per tablet Take 1 tablet by mouth once daily.      rosuvastatin (CRESTOR) 10 MG tablet Take 1 tablet by mouth once daily.  3    tadalafil (CIALIS) 20 MG Tab Take 1 tablet (20 mg total) by mouth daily as needed. 10 tablet  11    triamcinolone acetonide 0.1% (KENALOG) 0.1 % ointment APPLY A SMALL AMOUNT TO THE AFFECTED AREA QD UTD  1    vitamin E 1000 UNIT capsule Take 1,000 Units by mouth once daily.       No current facility-administered medications on file prior to visit.        Family History   Problem Relation Age of Onset    Cancer Mother         melanoma    Cancer Father         skin cancer    Celiac disease Neg Hx     Cirrhosis Neg Hx     Colon cancer Neg Hx     Colon polyps Neg Hx     Crohn's disease Neg Hx     Esophageal cancer Neg Hx     Inflammatory bowel disease Neg Hx     Irritable bowel syndrome Neg Hx     Liver cancer Neg Hx     Rectal cancer Neg Hx     Stomach cancer Neg Hx     Ulcerative colitis Neg Hx        Social History     Tobacco Use    Smoking status: Former Smoker     Packs/day: 0.50     Years: 4.00     Pack years: 2.00     Types: Cigarettes     Last attempt to quit: 1966     Years since quittin.4    Smokeless tobacco: Never Used    Tobacco comment: as teenager   Substance Use Topics    Alcohol use: Yes     Alcohol/week: 14.0 standard drinks     Types: 14 Glasses of wine per week     Comment: social    Drug use: No        Review of Systems:  ROS:  GENERAL: No fever, chills, fatigability or weight loss.  Integument:  No rashes, redness, icterus  CHEST: Denies HEADLEY, cyanosis, wheezing, cough and sputum production.  CARDIOVASCULAR: Denies chest pain, PND, orthopnea or reduced exercise tolerance.  GI:  Denies abd pain, dysphagia, nausea, vomiting, no hematemesis, no rectal pain  : Denies burning on urination, no hematuria, no bacteriuria  MSK:  No deformities, swelling, joint pain swelling  Neurologic:  No HAs, seizures, weakness, paresthesias, gait problems      OBJECTIVE:     Vital Signs (Most Recent)  There were no vitals taken for this visit.    Physical Exam:  General: White male in no distress   Skin/ Sclera anicteric  HEENT: anicteric, normocephalic, extraocular movements  intact   Neck trachea midline  Chest symmetric, nl excursions, no retractions  Respiratory: respirations are even and unlabored  COR RRR   Abdomen - inspection nl  soft NT ND.  no masses, no organomegaly  Extremities: Warm dry and intact.  NO CCE  Neuro: alert and oriented x 4.  Moves all extremities.             ASSESSMENT/PLAN:   Chronic and recurrent diverticulitis  History of pancreatic insufficiency    Recommend  Complete course of antibiotics  Return to clinic 1 month for repeat CT scan  Follow-up colonoscopy

## 2019-10-28 ENCOUNTER — TELEPHONE (OUTPATIENT)
Dept: ENDOSCOPY | Facility: HOSPITAL | Age: 74
End: 2019-10-28

## 2019-10-29 ENCOUNTER — TELEPHONE (OUTPATIENT)
Dept: ENDOSCOPY | Facility: HOSPITAL | Age: 74
End: 2019-10-29

## 2019-10-29 DIAGNOSIS — K86.81 EXOCRINE PANCREATIC INSUFFICIENCY: Primary | ICD-10-CM

## 2019-10-30 ENCOUNTER — OFFICE VISIT (OUTPATIENT)
Dept: DERMATOLOGY | Facility: CLINIC | Age: 74
End: 2019-10-30
Payer: COMMERCIAL

## 2019-10-30 ENCOUNTER — TELEPHONE (OUTPATIENT)
Dept: OPHTHALMOLOGY | Facility: CLINIC | Age: 74
End: 2019-10-30

## 2019-10-30 ENCOUNTER — LAB VISIT (OUTPATIENT)
Dept: LAB | Facility: HOSPITAL | Age: 74
End: 2019-10-30
Attending: INTERNAL MEDICINE
Payer: COMMERCIAL

## 2019-10-30 DIAGNOSIS — Z09 POSTOP CHECK: Primary | ICD-10-CM

## 2019-10-30 DIAGNOSIS — K86.81 EXOCRINE PANCREATIC INSUFFICIENCY: ICD-10-CM

## 2019-10-30 LAB — VIT B12 SERPL-MCNC: 1005 PG/ML (ref 210–950)

## 2019-10-30 PROCEDURE — 99024 PR POST-OP FOLLOW-UP VISIT: ICD-10-PCS | Mod: S$GLB,,, | Performed by: DERMATOLOGY

## 2019-10-30 PROCEDURE — 36415 COLL VENOUS BLD VENIPUNCTURE: CPT

## 2019-10-30 PROCEDURE — 99024 POSTOP FOLLOW-UP VISIT: CPT | Mod: S$GLB,,, | Performed by: DERMATOLOGY

## 2019-10-30 PROCEDURE — 82607 VITAMIN B-12: CPT

## 2019-10-30 RX ORDER — NEOMYCIN SULFATE, POLYMYXIN B SULFATE, AND DEXAMETHASONE 3.5; 10000; 1 MG/G; [USP'U]/G; MG/G
OINTMENT OPHTHALMIC NIGHTLY
Qty: 3.5 G | Refills: 3 | Status: SHIPPED | OUTPATIENT
Start: 2019-10-30 | End: 2019-11-19

## 2019-10-30 NOTE — TELEPHONE ENCOUNTER
Called and spoke to pt.  Pt is scheduled for labs on 10/30.  Pt asked for Dr. Mathews to give him a call.  Pt appreciated the call.

## 2019-10-30 NOTE — PROGRESS NOTES
74 y.o. male patient is here for suture removal following Mohs' surgery.    Patient reports no problems.    WOUND PE:  The R shin sutures intact. Wound healing well. Good skin edges. No signs or symptoms of infection.      IMPRESSION:  Healing operative site from Mohs' surgery SCC,R shin s/p Mohs' with CLC, postop day # 21.    PLAN:  Sutures removed today. Steri-strips applied.  Continue wound care.  Keep moist with Aquaphor.    RTC:  In 3-6 months with Sri East M.D. for skin check or sooner if new concern arises.

## 2019-11-18 ENCOUNTER — HOSPITAL ENCOUNTER (OUTPATIENT)
Dept: RADIOLOGY | Facility: HOSPITAL | Age: 74
Discharge: HOME | End: 2019-11-18
Attending: COLON & RECTAL SURGERY
Payer: COMMERCIAL

## 2019-11-18 DIAGNOSIS — K57.32 DIVERTICULITIS OF LARGE INTESTINE WITHOUT PERFORATION OR ABSCESS WITHOUT BLEEDING: ICD-10-CM

## 2019-11-18 PROCEDURE — 74177 CT ABDOMEN PELVIS WITH CONTRAST: ICD-10-PCS | Mod: 26,,, | Performed by: RADIOLOGY

## 2019-11-18 PROCEDURE — 74177 CT ABD & PELVIS W/CONTRAST: CPT | Mod: 26,,, | Performed by: RADIOLOGY

## 2019-11-18 PROCEDURE — 25500020 PHARM REV CODE 255: Performed by: COLON & RECTAL SURGERY

## 2019-11-18 PROCEDURE — 74177 CT ABD & PELVIS W/CONTRAST: CPT | Mod: TC

## 2019-11-18 RX ADMIN — IOHEXOL 15 ML: 350 INJECTION, SOLUTION INTRAVENOUS at 10:11

## 2019-11-18 RX ADMIN — IOHEXOL 30 ML: 350 INJECTION, SOLUTION INTRAVENOUS at 12:11

## 2019-11-18 RX ADMIN — IOHEXOL 75 ML: 350 INJECTION, SOLUTION INTRAVENOUS at 12:11

## 2019-11-18 RX ADMIN — IOHEXOL 15 ML: 350 INJECTION, SOLUTION INTRAVENOUS at 11:11

## 2019-11-20 ENCOUNTER — OFFICE VISIT (OUTPATIENT)
Dept: SURGERY | Facility: CLINIC | Age: 74
End: 2019-11-20
Payer: COMMERCIAL

## 2019-11-20 VITALS
WEIGHT: 168.88 LBS | BODY MASS INDEX: 26.51 KG/M2 | DIASTOLIC BLOOD PRESSURE: 70 MMHG | HEART RATE: 70 BPM | HEIGHT: 67 IN | SYSTOLIC BLOOD PRESSURE: 139 MMHG

## 2019-11-20 DIAGNOSIS — K57.32 DIVERTICULITIS LARGE INTESTINE W/O PERFORATION OR ABSCESS W/O BLEEDING: Primary | ICD-10-CM

## 2019-11-20 PROCEDURE — 99213 PR OFFICE/OUTPT VISIT, EST, LEVL III, 20-29 MIN: ICD-10-PCS | Mod: S$GLB,,, | Performed by: COLON & RECTAL SURGERY

## 2019-11-20 PROCEDURE — 99999 PR PBB SHADOW E&M-EST. PATIENT-LVL III: ICD-10-PCS | Mod: PBBFAC,,, | Performed by: COLON & RECTAL SURGERY

## 2019-11-20 PROCEDURE — 3075F PR MOST RECENT SYSTOLIC BLOOD PRESS GE 130-139MM HG: ICD-10-PCS | Mod: CPTII,S$GLB,, | Performed by: COLON & RECTAL SURGERY

## 2019-11-20 PROCEDURE — 3078F PR MOST RECENT DIASTOLIC BLOOD PRESSURE < 80 MM HG: ICD-10-PCS | Mod: CPTII,S$GLB,, | Performed by: COLON & RECTAL SURGERY

## 2019-11-20 PROCEDURE — 3078F DIAST BP <80 MM HG: CPT | Mod: CPTII,S$GLB,, | Performed by: COLON & RECTAL SURGERY

## 2019-11-20 PROCEDURE — 99213 OFFICE O/P EST LOW 20 MIN: CPT | Mod: S$GLB,,, | Performed by: COLON & RECTAL SURGERY

## 2019-11-20 PROCEDURE — 1126F PR PAIN SEVERITY QUANTIFIED, NO PAIN PRESENT: ICD-10-PCS | Mod: S$GLB,,, | Performed by: COLON & RECTAL SURGERY

## 2019-11-20 PROCEDURE — 1159F MED LIST DOCD IN RCRD: CPT | Mod: S$GLB,,, | Performed by: COLON & RECTAL SURGERY

## 2019-11-20 PROCEDURE — 1101F PR PT FALLS ASSESS DOC 0-1 FALLS W/OUT INJ PAST YR: ICD-10-PCS | Mod: CPTII,S$GLB,, | Performed by: COLON & RECTAL SURGERY

## 2019-11-20 PROCEDURE — 1159F PR MEDICATION LIST DOCUMENTED IN MEDICAL RECORD: ICD-10-PCS | Mod: S$GLB,,, | Performed by: COLON & RECTAL SURGERY

## 2019-11-20 PROCEDURE — 3075F SYST BP GE 130 - 139MM HG: CPT | Mod: CPTII,S$GLB,, | Performed by: COLON & RECTAL SURGERY

## 2019-11-20 PROCEDURE — 1126F AMNT PAIN NOTED NONE PRSNT: CPT | Mod: S$GLB,,, | Performed by: COLON & RECTAL SURGERY

## 2019-11-20 PROCEDURE — 99999 PR PBB SHADOW E&M-EST. PATIENT-LVL III: CPT | Mod: PBBFAC,,, | Performed by: COLON & RECTAL SURGERY

## 2019-11-20 PROCEDURE — 1101F PT FALLS ASSESS-DOCD LE1/YR: CPT | Mod: CPTII,S$GLB,, | Performed by: COLON & RECTAL SURGERY

## 2019-11-20 RX ORDER — ROSUVASTATIN CALCIUM 20 MG/1
TABLET, COATED ORAL
Refills: 3 | COMMUNITY
Start: 2019-11-16 | End: 2020-06-09

## 2019-11-20 NOTE — PROGRESS NOTES
HPI:  Alejandro Wayne is a 74 y.o. male with history of chronic diverticulitis.  He was treated with oral antibiotics.  He underwent repeat CT scan of the abdomen and pelvis.  See below.      Narrative     EXAMINATION:  CT ABDOMEN PELVIS WITH CONTRAST    CLINICAL HISTORY:  Diverticulitis, known, follow up; Diverticulitis of large intestine without perforation or abscess without bleeding    TECHNIQUE:  Low dose axial images, sagittal and coronal reformations were obtained from the lung bases to the pubic symphysis following the IV administration of 75 mL of Omnipaque 350 and the oral and rectal administration of 30 mL of Omnipaque 350.    COMPARISON:  October 21, 2019.    FINDINGS:  In the chest no significant volume of pleural or pericardial fluid.  Small hiatal hernia noted.  6 mm nodule right middle lobe series 2, image 4.  Unfortunately this area was not included on the multiple prior abdominal CT studies.  5 mm right lower lobe nodule series 2, image 13 similar to October 2019 and November 2015.  Approximate 4 mm left lower lobe nodule series 2, image 15 similar.    In the abdomen multiple hypodensities in the liver similar.  The larger are consistent with simple cyst.  Gallbladder is not distended.  Pancreas and spleen are normal.  No adrenal masses.  Subcentimeter hypodensities in the right kidney appears similar.  No convincing masses.    The aorta tapers normally.  Nonenlarged para-aortic nodes noted.    In the pelvis no convincing pelvic adenopathy.  Bladder and prostate unchanged.  Evaluation of the bowel demonstrates no rectal contrast.  There is persistent soft tissue stranding and wall thickening at the junction of the descending and sigmoid colon.  This is similar to prior.  More proximal bowel demonstrates diverticula.  No focal dilatation.  Appendix not definitely visualized.  Evaluation of the bones demonstrate no lytic or blastic lesions.  Spondylolysis at the lumbosacral level.      Impression        Persistent wall thickening and soft tissue stranding distal descending sigmoid colon junction.  Findings concerning for persistent diverticulitis.  Please note no rectal contrast visualized despite a rectal tube.    6 mm nodule right middle lobe series 2, image 4, not previously seen on prior studies as these studies did not include this area.  For a solid nodule 6-8 mm, Fleischner Society 2017 guidelines recommend follow up with non-contrast chest CT at 6-12 months and 18-24 months after discovery.    Additional findings as above.    This report was flagged in Epic as abnormal.    Yellow Pulmonary Nodule 2017 Alert: 6 mm right middle lobe nodule    Yellow Actionable Finding (Radiology: Volume 284: Number 1-July 2017)    Fleischner Guidelines do not apply in patients younger than 35 years, immunocompromised patients or patients with cancer. Risk assignment based on ACCP with low risk being less than 5% and high risk combining intermediate and high risk categories.    UNEXPECTED FINDINGS:  6 mm right middle lobe nodule    RECOMMENDATIONS:  See recommendations above      Electronically signed by: Leno Rosales MD  Date: 11/18/2019  Time: 12:53     Interval history:  Feels well. Having formed stools every day.  No longer having diarrhea.  No further fever which apparently he would have intermittently in the past.  He denies any change in his energy levels.  No rectal bleeding.      Past Medical History:   Diagnosis Date    Basal cell carcinoma     BPH (benign prostatic hyperplasia)     Colon polyp     Hyperlipidemia     Hypertension     Liver cyst 1/11/2016    Prostate nodule     Squamous cell carcinoma         Past Surgical History:   Procedure Laterality Date    APPENDECTOMY      COLONOSCOPY N/A 6/22/2016    Procedure: COLONOSCOPY;  Surgeon: Marco Mathews MD;  Location: [address];  Service: Endoscopy;  Laterality: N/A;    COLONOSCOPY N/A 10/21/2019    Procedure: COLONOSCOPY;  Surgeon:  Marco Mathews MD;  Location: [facility] (4TH FLR);  Service: Endoscopy;  Laterality: N/A;  First case of the day with me next available     COLONOSCOPY W/ POLYPECTOMY      ENDOSCOPIC ULTRASOUND OF UPPER GASTROINTESTINAL TRACT N/A 6/24/2019    Procedure: ULTRASOUND, UPPER GI TRACT, ENDOSCOPIC;  Surgeon: Jeremy Saleem MD;  Location: [facility] (2ND FLR);  Service: Endoscopy;  Laterality: N/A;  For evaluation of low fecal elastase and loose stools and history of nonspecific finding of the EUS of the pancreas    ESOPHAGOGASTRODUODENOSCOPY      ESOPHAGOGASTRODUODENOSCOPY N/A 6/24/2019    Procedure: EGD (ESOPHAGOGASTRODUODENOSCOPY);  Surgeon: Jeremy Saleem MD;  Location: [facility] (2ND FLR);  Service: Endoscopy;  Laterality: N/A;  For evaluation of iron deficiency anemia    ESOPHAGOGASTRODUODENOSCOPY N/A 10/21/2019    Procedure: EGD (ESOPHAGOGASTRODUODENOSCOPY);  Surgeon: Marco Mathews MD;  Location: [facility] (4TH FLR);  Service: Endoscopy;  Laterality: N/A;  First case of the day with me next available     HERNIA REPAIR      KNEE ARTHROSCOPY W/ DEBRIDEMENT      TONSILLECTOMY, ADENOIDECTOMY      UPPER ENDOSCOPIC ULTRASOUND W/ FNA      VASECTOMY         Review of patient's allergies indicates:   Allergen Reactions    Meloxicam      iarrhea       Family History   Problem Relation Age of Onset    Cancer Mother         melanoma    Cancer Father         skin cancer    Celiac disease Neg Hx     Cirrhosis Neg Hx     Colon cancer Neg Hx     Colon polyps Neg Hx     Crohn's disease Neg Hx     Esophageal cancer Neg Hx     Inflammatory bowel disease Neg Hx     Irritable bowel syndrome Neg Hx     Liver cancer Neg Hx     Rectal cancer Neg Hx     Stomach cancer Neg Hx     Ulcerative colitis Neg Hx        Social History     Socioeconomic History    Marital status:      Spouse name: Not on file    Number of children: Not on file    Years of education: Not on file    Highest education level: Not  "on file   Occupational History    Not on file   Social Needs    Financial resource strain: Not on file    Food insecurity:     Worry: Not on file     Inability: Not on file    Transportation needs:     Medical: Not on file     Non-medical: Not on file   Tobacco Use    Smoking status: Former Smoker     Packs/day: 0.50     Years: 4.00     Pack years: 2.00     Types: Cigarettes     Last attempt to quit: 1966     Years since quittin.5    Smokeless tobacco: Never Used    Tobacco comment: as teenager   Substance and Sexual Activity    Alcohol use: Yes     Alcohol/week: 14.0 standard drinks     Types: 14 Glasses of wine per week     Comment: social    Drug use: No    Sexual activity: Yes     Partners: Female   Lifestyle    Physical activity:     Days per week: Not on file     Minutes per session: Not on file    Stress: Not on file   Relationships    Social connections:     Talks on phone: Not on file     Gets together: Not on file     Attends Hinduism service: Not on file     Active member of club or organization: Not on file     Attends meetings of clubs or organizations: Not on file     Relationship status: Not on file   Other Topics Concern    Not on file   Social History Narrative    Not on file       ROS:  GENERAL: No fever, chills, fatigability or weight loss.  Integument: No rashes, redness, icterus  CHEST: Denies HEADLEY, cyanosis, wheezing, cough and sputum production.  CARDIOVASCULAR: Denies chest pain, PND, orthopnea or reduced exercise tolerance.  GI: Denies abd pain, dysphagia, nausea, vomiting, no hematemesis   : Denies burning on urination, no hematuria, no bacteriuria  MSK: No deformities, swelling, joint pain swelling  Neurologic: No HAs, seizures, weakness, paresthesias, gait problems    PE:  General appearance healthy, nontoxic  /70 (BP Location: Left arm, Patient Position: Sitting, BP Method: Large (Automatic))   Pulse 70   Ht 5' 7" (1.702 m)   Wt 76.6 kg (168 lb 14 oz)  "  BMI 26.45 kg/m²   Sclera/ Skin anicteric  AT NC EOMI  Neck supple trachea midline   Chest symmetric, nl excursion, no retractions, breathing comfortably  Abdomen  ND soft NT.  No masses, no organomegaly  EXT - no CCE  Neuro:  Mood/ affect nl, alert and oriented x 3, moves all ext's, gait nl      Assessment:  Chronic diverticulitis, improved following antibiotic treatment and high-fiber diet.  Persistent chronic inflammation of the left and sigmoid colon on CT scan without any appreciable change.    Plan:  Long discussion with the patient and his wife regarding his chronic diverticulitis and the persistent changes seen on CT scan.  Given his history of recurrent symptoms and intermittent fever over the years I would recommend elective resection and we discussed the details of such and the expected recuperation from laparoscopic left colectomy.  He wants to proceed with surgery. However, his wife has an acute medical condition which will require likely intervention, surgery. The 2 of them would like to wait until this is addressed and she has recovered before he would then undergo colectomy.    He will return after the holidays for planning for elective surgery.

## 2019-11-22 DIAGNOSIS — I10 ESSENTIAL HYPERTENSION: Primary | ICD-10-CM

## 2019-11-22 RX ORDER — AMLODIPINE BESYLATE 5 MG/1
5 TABLET ORAL DAILY
Qty: 90 TABLET | Refills: 3 | Status: SHIPPED | OUTPATIENT
Start: 2019-11-22 | End: 2020-09-21

## 2019-12-13 ENCOUNTER — TELEPHONE (OUTPATIENT)
Dept: SURGERY | Facility: CLINIC | Age: 74
End: 2019-12-13

## 2019-12-13 NOTE — TELEPHONE ENCOUNTER
Spoke with patient. Requesting one of the following dates for surgery: 1/2,1/3,1,9,1/0. Informed him we will confirm with Dr. Hall and call him back.

## 2019-12-13 NOTE — TELEPHONE ENCOUNTER
----- Message from Chrissie Collins sent at 12/13/2019 10:48 AM CST -----  Contact: pt [phone]  Alejandro Wayne calling regarding Appointment Access (message) for surgery with Dr Hall

## 2019-12-16 ENCOUNTER — TELEPHONE (OUTPATIENT)
Dept: SURGERY | Facility: CLINIC | Age: 74
End: 2019-12-16

## 2019-12-17 ENCOUNTER — TELEPHONE (OUTPATIENT)
Dept: SURGERY | Facility: CLINIC | Age: 74
End: 2019-12-17

## 2019-12-18 ENCOUNTER — TELEPHONE (OUTPATIENT)
Dept: SURGERY | Facility: CLINIC | Age: 74
End: 2019-12-18

## 2019-12-18 ENCOUNTER — PATIENT MESSAGE (OUTPATIENT)
Dept: SURGERY | Facility: CLINIC | Age: 74
End: 2019-12-18

## 2019-12-18 RX ORDER — MOXIFLOXACIN HYDROCHLORIDE 400 MG/1
400 TABLET ORAL DAILY
Qty: 14 TABLET | Refills: 1 | Status: SHIPPED | OUTPATIENT
Start: 2019-12-18 | End: 2020-11-10

## 2019-12-18 NOTE — TELEPHONE ENCOUNTER
----- Message from Chrissie Collins sent at 12/18/2019 10:29 AM CST -----  Contact: pt [phone]  Pt states that he's returning call from Kait. Please call back

## 2019-12-18 NOTE — TELEPHONE ENCOUNTER
Patient apparently has plans to travel in the near future and would like antibiotics to travel with just in case he gets diverticulitis I will prescribe him Avelox

## 2020-01-08 ENCOUNTER — HOSPITAL ENCOUNTER (OUTPATIENT)
Dept: RADIOLOGY | Facility: HOSPITAL | Age: 75
Discharge: HOME | End: 2020-01-08
Attending: INTERNAL MEDICINE
Payer: COMMERCIAL

## 2020-01-08 DIAGNOSIS — R74.8 ELEVATED LIVER ENZYMES: ICD-10-CM

## 2020-01-08 PROCEDURE — 76705 ECHO EXAM OF ABDOMEN: CPT | Mod: TC

## 2020-01-08 PROCEDURE — 76705 US ABDOMEN LIMITED: ICD-10-PCS | Mod: 26,,, | Performed by: RADIOLOGY

## 2020-01-08 PROCEDURE — 76705 ECHO EXAM OF ABDOMEN: CPT | Mod: 26,,, | Performed by: RADIOLOGY

## 2020-01-20 RX ORDER — AMLODIPINE BESYLATE 5 MG/1
TABLET ORAL
Qty: 90 TABLET | Refills: 0 | OUTPATIENT
Start: 2020-01-20

## 2020-04-30 ENCOUNTER — TELEPHONE (OUTPATIENT)
Dept: GASTROENTEROLOGY | Facility: CLINIC | Age: 75
End: 2020-04-30

## 2020-04-30 ENCOUNTER — PATIENT MESSAGE (OUTPATIENT)
Dept: GASTROENTEROLOGY | Facility: CLINIC | Age: 75
End: 2020-04-30

## 2020-04-30 ENCOUNTER — PATIENT MESSAGE (OUTPATIENT)
Dept: SURGERY | Facility: CLINIC | Age: 75
End: 2020-04-30

## 2020-04-30 DIAGNOSIS — K86.81 EXOCRINE PANCREATIC INSUFFICIENCY: ICD-10-CM

## 2020-04-30 DIAGNOSIS — Z51.81 ENCOUNTER FOR MONITORING LONG-TERM PROTON PUMP INHIBITOR THERAPY: ICD-10-CM

## 2020-04-30 DIAGNOSIS — Z79.899 ENCOUNTER FOR MONITORING LONG-TERM PROTON PUMP INHIBITOR THERAPY: ICD-10-CM

## 2020-04-30 DIAGNOSIS — K64.9 HEMORRHOIDS, UNSPECIFIED HEMORRHOID TYPE: Primary | ICD-10-CM

## 2020-04-30 DIAGNOSIS — K52.9 COLITIS: Primary | ICD-10-CM

## 2020-04-30 DIAGNOSIS — K52.9 COLITIS: ICD-10-CM

## 2020-04-30 RX ORDER — HYDROCORTISONE ACETATE 25 MG/1
25 SUPPOSITORY RECTAL EVERY 12 HOURS PRN
Qty: 12 SUPPOSITORY | Refills: 0 | Status: SHIPPED | OUTPATIENT
Start: 2020-04-30 | End: 2020-05-06

## 2020-04-30 RX ORDER — PANTOPRAZOLE SODIUM 40 MG/1
40 TABLET, DELAYED RELEASE ORAL
Qty: 90 TABLET | Refills: 3 | Status: SHIPPED | OUTPATIENT
Start: 2020-04-30 | End: 2020-04-30 | Stop reason: SDUPTHER

## 2020-04-30 RX ORDER — PANTOPRAZOLE SODIUM 40 MG/1
40 TABLET, DELAYED RELEASE ORAL
Qty: 90 TABLET | Refills: 3 | Status: SHIPPED | OUTPATIENT
Start: 2020-04-30 | End: 2020-11-24 | Stop reason: DRUGHIGH

## 2020-05-04 ENCOUNTER — TELEPHONE (OUTPATIENT)
Dept: GASTROENTEROLOGY | Facility: CLINIC | Age: 75
End: 2020-05-04

## 2020-05-04 ENCOUNTER — PATIENT MESSAGE (OUTPATIENT)
Dept: SURGERY | Facility: CLINIC | Age: 75
End: 2020-05-04

## 2020-05-04 ENCOUNTER — LAB VISIT (OUTPATIENT)
Dept: LAB | Facility: HOSPITAL | Age: 75
End: 2020-05-04
Attending: INTERNAL MEDICINE
Payer: COMMERCIAL

## 2020-05-04 ENCOUNTER — DOCUMENTATION ONLY (OUTPATIENT)
Dept: TRANSPLANT | Facility: CLINIC | Age: 75
End: 2020-05-04

## 2020-05-04 DIAGNOSIS — Z79.899 ENCOUNTER FOR MONITORING LONG-TERM PROTON PUMP INHIBITOR THERAPY: ICD-10-CM

## 2020-05-04 DIAGNOSIS — K86.81 EXOCRINE PANCREATIC INSUFFICIENCY: ICD-10-CM

## 2020-05-04 DIAGNOSIS — R79.1 ELEVATED INR: ICD-10-CM

## 2020-05-04 DIAGNOSIS — K52.9 COLITIS: ICD-10-CM

## 2020-05-04 DIAGNOSIS — Z51.81 ENCOUNTER FOR MONITORING LONG-TERM PROTON PUMP INHIBITOR THERAPY: ICD-10-CM

## 2020-05-04 DIAGNOSIS — D50.9 IRON DEFICIENCY ANEMIA, UNSPECIFIED IRON DEFICIENCY ANEMIA TYPE: Primary | ICD-10-CM

## 2020-05-04 DIAGNOSIS — K76.9 LIVER DISEASE: Primary | ICD-10-CM

## 2020-05-04 LAB
25(OH)D3+25(OH)D2 SERPL-MCNC: 39 NG/ML (ref 30–96)
ALBUMIN SERPL BCP-MCNC: 4 G/DL (ref 3.5–5.2)
ALP SERPL-CCNC: 53 U/L (ref 55–135)
ALT SERPL W/O P-5'-P-CCNC: 20 U/L (ref 10–44)
ANION GAP SERPL CALC-SCNC: 10 MMOL/L (ref 8–16)
AST SERPL-CCNC: 23 U/L (ref 10–40)
BASOPHILS # BLD AUTO: 0.1 K/UL (ref 0–0.2)
BASOPHILS NFR BLD: 1 % (ref 0–1.9)
BILIRUB DIRECT SERPL-MCNC: 0.1 MG/DL (ref 0.1–0.3)
BILIRUB SERPL-MCNC: 0.7 MG/DL (ref 0.1–1)
BUN SERPL-MCNC: 11 MG/DL (ref 8–23)
CALCIUM SERPL-MCNC: 8.4 MG/DL (ref 8.7–10.5)
CHLORIDE SERPL-SCNC: 101 MMOL/L (ref 95–110)
CO2 SERPL-SCNC: 25 MMOL/L (ref 23–29)
CREAT SERPL-MCNC: 0.6 MG/DL (ref 0.5–1.4)
DIFFERENTIAL METHOD: ABNORMAL
EOSINOPHIL # BLD AUTO: 0.2 K/UL (ref 0–0.5)
EOSINOPHIL NFR BLD: 2.2 % (ref 0–8)
ERYTHROCYTE [DISTWIDTH] IN BLOOD BY AUTOMATED COUNT: 13 % (ref 11.5–14.5)
EST. GFR  (AFRICAN AMERICAN): >60 ML/MIN/1.73 M^2
EST. GFR  (NON AFRICAN AMERICAN): >60 ML/MIN/1.73 M^2
FERRITIN SERPL-MCNC: 191 NG/ML (ref 20–300)
GLUCOSE SERPL-MCNC: 100 MG/DL (ref 70–110)
HCT VFR BLD AUTO: 37.8 % (ref 40–54)
HGB BLD-MCNC: 12.4 G/DL (ref 14–18)
IGA SERPL-MCNC: 228 MG/DL (ref 40–350)
IMM GRANULOCYTES # BLD AUTO: 0.05 K/UL (ref 0–0.04)
IMM GRANULOCYTES NFR BLD AUTO: 0.5 % (ref 0–0.5)
INR PPP: 1.3 (ref 0.8–1.2)
IRON SERPL-MCNC: 30 UG/DL (ref 45–160)
LIPASE SERPL-CCNC: 55 U/L (ref 4–60)
LYMPHOCYTES # BLD AUTO: 1 K/UL (ref 1–4.8)
LYMPHOCYTES NFR BLD: 9.9 % (ref 18–48)
MAGNESIUM SERPL-MCNC: 2.1 MG/DL (ref 1.6–2.6)
MCH RBC QN AUTO: 28.2 PG (ref 27–31)
MCHC RBC AUTO-ENTMCNC: 32.8 G/DL (ref 32–36)
MCV RBC AUTO: 86 FL (ref 82–98)
MONOCYTES # BLD AUTO: 1 K/UL (ref 0.3–1)
MONOCYTES NFR BLD: 10.6 % (ref 4–15)
NEUTROPHILS # BLD AUTO: 7.4 K/UL (ref 1.8–7.7)
NEUTROPHILS NFR BLD: 75.8 % (ref 38–73)
NRBC BLD-RTO: 0 /100 WBC
PLATELET # BLD AUTO: 328 K/UL (ref 150–350)
PMV BLD AUTO: 9.1 FL (ref 9.2–12.9)
POTASSIUM SERPL-SCNC: 3.5 MMOL/L (ref 3.5–5.1)
PROT SERPL-MCNC: 7.5 G/DL (ref 6–8.4)
PROTHROMBIN TIME: 13.6 SEC (ref 9–12.5)
RBC # BLD AUTO: 4.39 M/UL (ref 4.6–6.2)
SATURATED IRON: 11 % (ref 20–50)
SODIUM SERPL-SCNC: 136 MMOL/L (ref 136–145)
TOTAL IRON BINDING CAPACITY: 281 UG/DL (ref 250–450)
TRANSFERRIN SERPL-MCNC: 190 MG/DL (ref 200–375)
TSH SERPL DL<=0.005 MIU/L-ACNC: 1.97 UIU/ML (ref 0.34–5.6)
VIT B12 SERPL-MCNC: 972 PG/ML (ref 210–950)
WBC # BLD AUTO: 9.72 K/UL (ref 3.9–12.7)

## 2020-05-04 PROCEDURE — 83690 ASSAY OF LIPASE: CPT

## 2020-05-04 PROCEDURE — 82306 VITAMIN D 25 HYDROXY: CPT

## 2020-05-04 PROCEDURE — 86706 HEP B SURFACE ANTIBODY: CPT

## 2020-05-04 PROCEDURE — 84443 ASSAY THYROID STIM HORMONE: CPT

## 2020-05-04 PROCEDURE — 83735 ASSAY OF MAGNESIUM: CPT

## 2020-05-04 PROCEDURE — 86790 VIRUS ANTIBODY NOS: CPT

## 2020-05-04 PROCEDURE — 82607 VITAMIN B-12: CPT

## 2020-05-04 PROCEDURE — 82784 ASSAY IGA/IGD/IGG/IGM EACH: CPT

## 2020-05-04 PROCEDURE — 83516 IMMUNOASSAY NONANTIBODY: CPT

## 2020-05-04 PROCEDURE — 36415 COLL VENOUS BLD VENIPUNCTURE: CPT

## 2020-05-04 PROCEDURE — 83540 ASSAY OF IRON: CPT

## 2020-05-04 PROCEDURE — 85025 COMPLETE CBC W/AUTO DIFF WBC: CPT

## 2020-05-04 PROCEDURE — 82728 ASSAY OF FERRITIN: CPT

## 2020-05-04 PROCEDURE — 80076 HEPATIC FUNCTION PANEL: CPT

## 2020-05-04 PROCEDURE — 85610 PROTHROMBIN TIME: CPT

## 2020-05-04 PROCEDURE — 80048 BASIC METABOLIC PNL TOTAL CA: CPT

## 2020-05-04 RX ORDER — FERROUS SULFATE 325(65) MG
325 TABLET ORAL EVERY 12 HOURS
Qty: 60 TABLET | Refills: 4 | Status: SHIPPED | OUTPATIENT
Start: 2020-05-04 | End: 2020-12-24 | Stop reason: SDUPTHER

## 2020-05-04 RX ORDER — FERROUS SULFATE 325(65) MG
325 TABLET ORAL EVERY 12 HOURS
Qty: 60 TABLET | Refills: 4 | Status: SHIPPED | OUTPATIENT
Start: 2020-05-04 | End: 2021-04-30

## 2020-05-04 NOTE — NURSING
Pt records reviewed.   Pt will be referred to Hepatology. PT EP of Dr Zambrano.  Message to Dr Zambrano staff Randi Santos and staff box.   Initial referral received  from the [facility]que.   Referring Provider/diagnosis  MARJORIE SALINAS Provider:   Diagnosis: Liver disease           Referral letter sent to patient.

## 2020-05-04 NOTE — LETTER
May 4, 2020    Alejandro Wayne  [address] [place] [id number]      Dear Alejandro Wayne:    Your doctor has referred you to the [facility]. We are sending this letter to remind you to make an appointment with us to complete the referral process.     Please call us at [phone] to schedule an appointment. We look forward to seeing you soon.     If you received a call and have been scheduled, please disregard this letter.       Sincerely,        [facility]   [facility], [place] [id number] [phone]

## 2020-05-05 ENCOUNTER — PATIENT MESSAGE (OUTPATIENT)
Dept: HEPATOLOGY | Facility: CLINIC | Age: 75
End: 2020-05-05

## 2020-05-05 ENCOUNTER — LAB VISIT (OUTPATIENT)
Dept: LAB | Facility: HOSPITAL | Age: 75
End: 2020-05-05
Attending: INTERNAL MEDICINE
Payer: COMMERCIAL

## 2020-05-05 ENCOUNTER — TELEPHONE (OUTPATIENT)
Dept: GASTROENTEROLOGY | Facility: CLINIC | Age: 75
End: 2020-05-05

## 2020-05-05 DIAGNOSIS — R79.1 ELEVATED INR: ICD-10-CM

## 2020-05-05 DIAGNOSIS — D50.9 IRON DEFICIENCY ANEMIA, UNSPECIFIED IRON DEFICIENCY ANEMIA TYPE: ICD-10-CM

## 2020-05-05 LAB
BASOPHILS # BLD AUTO: 0.08 K/UL (ref 0–0.2)
BASOPHILS NFR BLD: 0.7 % (ref 0–1.9)
DIFFERENTIAL METHOD: ABNORMAL
EOSINOPHIL # BLD AUTO: 0.3 K/UL (ref 0–0.5)
EOSINOPHIL NFR BLD: 2.4 % (ref 0–8)
ERYTHROCYTE [DISTWIDTH] IN BLOOD BY AUTOMATED COUNT: 12.9 % (ref 11.5–14.5)
HCT VFR BLD AUTO: 33.7 % (ref 40–54)
HEPATITIS A ANTIBODY, IGG: NEGATIVE
HGB BLD-MCNC: 11.1 G/DL (ref 14–18)
IMM GRANULOCYTES # BLD AUTO: 0.05 K/UL (ref 0–0.04)
IMM GRANULOCYTES NFR BLD AUTO: 0.5 % (ref 0–0.5)
INR PPP: 1.3 (ref 0.8–1.2)
LYMPHOCYTES # BLD AUTO: 1.1 K/UL (ref 1–4.8)
LYMPHOCYTES NFR BLD: 9.6 % (ref 18–48)
MCH RBC QN AUTO: 28.1 PG (ref 27–31)
MCHC RBC AUTO-ENTMCNC: 32.9 G/DL (ref 32–36)
MCV RBC AUTO: 85 FL (ref 82–98)
MONOCYTES # BLD AUTO: 1.3 K/UL (ref 0.3–1)
MONOCYTES NFR BLD: 11.7 % (ref 4–15)
NEUTROPHILS # BLD AUTO: 8.3 K/UL (ref 1.8–7.7)
NEUTROPHILS NFR BLD: 75.1 % (ref 38–73)
NRBC BLD-RTO: 0 /100 WBC
PLATELET # BLD AUTO: 300 K/UL (ref 150–350)
PMV BLD AUTO: 9 FL (ref 9.2–12.9)
PROTHROMBIN TIME: 14.2 SEC (ref 9–12.5)
RBC # BLD AUTO: 3.95 M/UL (ref 4.6–6.2)
WBC # BLD AUTO: 10.99 K/UL (ref 3.9–12.7)

## 2020-05-05 PROCEDURE — 85025 COMPLETE CBC W/AUTO DIFF WBC: CPT

## 2020-05-05 PROCEDURE — 82728 ASSAY OF FERRITIN: CPT

## 2020-05-05 PROCEDURE — 85610 PROTHROMBIN TIME: CPT

## 2020-05-05 PROCEDURE — 36415 COLL VENOUS BLD VENIPUNCTURE: CPT

## 2020-05-05 NOTE — TELEPHONE ENCOUNTER
Spoke with patient and scheduled labs in [place] as requested for 2:15 today.  Pt verbalized understanding to all and has no further questions at this time.   ----- Message from Chrissie Collins sent at 5/5/2020  9:26 AM CDT -----  Contact: pt [phone]  Pt wants to speak with Nithya regarding labs. Please call

## 2020-05-06 ENCOUNTER — TELEPHONE (OUTPATIENT)
Dept: HEPATOLOGY | Facility: HOSPITAL | Age: 75
End: 2020-05-06

## 2020-05-06 DIAGNOSIS — K74.00 HEPATIC FIBROSIS: Primary | ICD-10-CM

## 2020-05-06 LAB — FERRITIN SERPL-MCNC: 194 NG/ML (ref 20–300)

## 2020-05-06 NOTE — TELEPHONE ENCOUNTER
Patient called and message relayed from Dr Marin. Pt agreed to do the MRI Elastography.  Appt scheduled for Friday 5/15/20 at 4:45 pm per request of the patient. Pt was able to see appt in My Chart. Appt letter placed in the mail.

## 2020-05-06 NOTE — TELEPHONE ENCOUNTER
----- Message from Marco Mathews MD sent at 5/5/2020  4:13 PM CDT -----  Dr. Tania Allen is repeat INR still 1.3.  He is not on Coumadin or blood thinners.  If you think he would benefit from estimating whether he may have compensated cirrhosis or significant liver Fibrosis with an MR Elastography could I trouble you to order that since I  not sure how to do that or how to interpret that result.    Thanks,    Marco

## 2020-05-07 ENCOUNTER — OFFICE VISIT (OUTPATIENT)
Dept: SURGERY | Facility: CLINIC | Age: 75
End: 2020-05-07
Payer: COMMERCIAL

## 2020-05-07 ENCOUNTER — TELEPHONE (OUTPATIENT)
Dept: GASTROENTEROLOGY | Facility: CLINIC | Age: 75
End: 2020-05-07

## 2020-05-07 ENCOUNTER — PATIENT MESSAGE (OUTPATIENT)
Dept: GASTROENTEROLOGY | Facility: CLINIC | Age: 75
End: 2020-05-07

## 2020-05-07 DIAGNOSIS — R19.7 DIARRHEA, UNSPECIFIED TYPE: Primary | ICD-10-CM

## 2020-05-07 DIAGNOSIS — R19.7 DIARRHEA, UNSPECIFIED TYPE: ICD-10-CM

## 2020-05-07 DIAGNOSIS — R53.83 FATIGUE, UNSPECIFIED TYPE: Primary | ICD-10-CM

## 2020-05-07 DIAGNOSIS — K57.32 DIVERTICULITIS LARGE INTESTINE W/O PERFORATION OR ABSCESS W/O BLEEDING: Primary | ICD-10-CM

## 2020-05-07 DIAGNOSIS — R19.5 LOOSE STOOLS: ICD-10-CM

## 2020-05-07 LAB
HBV SURFACE AB SER QL IA: NEGATIVE
HBV SURFACE AB SERPL IA-ACNC: <3 MIU/ML

## 2020-05-07 PROCEDURE — 99212 PR OFFICE/OUTPT VISIT, EST, LEVL II, 10-19 MIN: ICD-10-PCS | Mod: 95,,, | Performed by: COLON & RECTAL SURGERY

## 2020-05-07 PROCEDURE — 99212 OFFICE O/P EST SF 10 MIN: CPT | Mod: 95,,, | Performed by: COLON & RECTAL SURGERY

## 2020-05-07 NOTE — PROGRESS NOTES
"Anesthetic BS I that could get exactly via via The patient location is: home  The chief complaint leading to consultation is: follow up diverticulitis  Visit type: audio only  Total time spent with patient: 20  Each patient to whom he or she provides medical services by telemedicine is:  (1) informed of the relationship between the physician and patient and the respective role of any other health care provider with respect to management of the patient; and (2) notified that he or she may decline to receive medical services by telemedicine and may withdraw from such care at any time.    Notes:   HPI:  Alejandro Wayne is a 74 y.o. male with history of recurrent diverticulitis.   Last medical treated 11/2019.  Previously recommended that he undergo elective resection given multiple attacks.  He decided due to other family health priorities to forego surgery last Fall and now presents for consideration of scheduling surgery.      2-3 weeks - "runny stools", small amt blood. Urgent.. Had to close to BR. 6-7 x day.  Pain in lower abdomen.  Has had fatigue, feeling chills and fever.  Pantoprozole - helped with bloating.  Dicyclomine for spasms.   Frequent stools difficulty evacuating  Began to develop anal swelling and discomfort.  Better with anusol - hemorrhoid swelling, discomfort    Treated with Anusol by GI (Marco Mathews).  Better evacuations and improved hemorrhoids.  Not having as much pain in abdomen.        Cscope 10-  Findings:       The terminal ileum appeared normal. Biopsies were taken with a cold        forceps for histology. Estimated blood loss was minimal.       The perianal and digital rectal examinations were normal.       The retroflexed view of the distal rectum and anal verge was normal        and showed no anal or rectal abnormalities.       Multiple small and large-mouthed diverticula were found in the        recto-sigmoid colon, sigmoid colon, descending colon, transverse        colon and " ascending colon.       A patchy area of moderately erythematous mucosa was found in the        sigmoid colon with diverticula with pus c/w with acute        diverticulitis.       An area of moderately congested mucosa was found in the sigmoid        colon with pus c/w with acute diverticulitis    EGD 10-  Findings:       The examined duodenum was normal. Biopsies for histology were taken        with a cold forceps for evaluation of celiac disease. Biopsies were        taken with a cold forceps for histology. Estimated blood loss was        minimal.       The entire examined stomach was normal. Biopsies were taken with a        cold forceps for histology. Biopsies were taken with a cold forceps        for histology. Estimated blood loss was minimal.       The cardia and gastric fundus were normal on retroflexion.       No portal HTN and no gastric varices.       A 1 cm hiatal hernia was found. The proximal extent of the gastric        folds (end of tubular esophagus) was 38 cm from the incisors. The        hiatal narrowing was 39 cm from the incisors. The Z-line was 38 cm        from the incisors. Z-line was sharp. No esophagitis and no columnar        esophagus and no lesions seen with NBI or white light.       The Z-line was regular and was found 38 cm from the incisors. No        esophgeal varices.  Past Medical History:   Diagnosis Date    Basal cell carcinoma     BPH (benign prostatic hyperplasia)     Colon polyp     Hyperlipidemia     Hypertension     Liver cyst 1/11/2016    Prostate nodule     Squamous cell carcinoma         Past Surgical History:   Procedure Laterality Date    APPENDECTOMY      COLONOSCOPY N/A 6/22/2016    Procedure: COLONOSCOPY;  Surgeon: Marco Mathews MD;  Location: [address]);  Service: Endoscopy;  Laterality: N/A;    COLONOSCOPY N/A 10/21/2019    Procedure: COLONOSCOPY;  Surgeon: Marco Mathews MD;  Location: [facility] ([address] [place] [address]);  Service: Endoscopy;   Laterality: N/A;  First case of the day with me next available     COLONOSCOPY W/ POLYPECTOMY      ENDOSCOPIC ULTRASOUND OF UPPER GASTROINTESTINAL TRACT N/A 6/24/2019    Procedure: ULTRASOUND, UPPER GI TRACT, ENDOSCOPIC;  Surgeon: Jeremy Saleem MD;  Location: [facility] (2ND FLR);  Service: Endoscopy;  Laterality: N/A;  For evaluation of low fecal elastase and loose stools and history of nonspecific finding of the EUS of the pancreas    ESOPHAGOGASTRODUODENOSCOPY      ESOPHAGOGASTRODUODENOSCOPY N/A 6/24/2019    Procedure: EGD (ESOPHAGOGASTRODUODENOSCOPY);  Surgeon: Jeremy Saleem MD;  Location: [facility] (2ND FLR);  Service: Endoscopy;  Laterality: N/A;  For evaluation of iron deficiency anemia    ESOPHAGOGASTRODUODENOSCOPY N/A 10/21/2019    Procedure: EGD (ESOPHAGOGASTRODUODENOSCOPY);  Surgeon: Marco Mathews MD;  Location: [facility] (4TH FLR);  Service: Endoscopy;  Laterality: N/A;  First case of the day with me next available     HERNIA REPAIR      KNEE ARTHROSCOPY W/ DEBRIDEMENT      TONSILLECTOMY, ADENOIDECTOMY      UPPER ENDOSCOPIC ULTRASOUND W/ FNA      VASECTOMY         Review of patient's allergies indicates:   Allergen Reactions    Meloxicam      iarrhea       Family History   Problem Relation Age of Onset    Cancer Mother         melanoma    Cancer Father         skin cancer    Celiac disease Neg Hx     Cirrhosis Neg Hx     Colon cancer Neg Hx     Colon polyps Neg Hx     Crohn's disease Neg Hx     Esophageal cancer Neg Hx     Inflammatory bowel disease Neg Hx     Irritable bowel syndrome Neg Hx     Liver cancer Neg Hx     Rectal cancer Neg Hx     Stomach cancer Neg Hx     Ulcerative colitis Neg Hx        Social History     Socioeconomic History    Marital status:      Spouse name: Not on file    Number of children: Not on file    Years of education: Not on file    Highest education level: Not on file   Occupational History    Not on file   Social Needs    Financial  resource strain: Not on file    Food insecurity:     Worry: Not on file     Inability: Not on file    Transportation needs:     Medical: Not on file     Non-medical: Not on file   Tobacco Use    Smoking status: Former Smoker     Packs/day: 0.50     Years: 4.00     Pack years: 2.00     Types: Cigarettes     Last attempt to quit: 1966     Years since quittin.0    Smokeless tobacco: Never Used    Tobacco comment: as teenager   Substance and Sexual Activity    Alcohol use: Yes     Alcohol/week: 14.0 standard drinks     Types: 14 Glasses of wine per week     Comment: social    Drug use: No    Sexual activity: Yes     Partners: Female   Lifestyle    Physical activity:     Days per week: Not on file     Minutes per session: Not on file    Stress: Not on file   Relationships    Social connections:     Talks on phone: Not on file     Gets together: Not on file     Attends Zoroastrianism service: Not on file     Active member of club or organization: Not on file     Attends meetings of clubs or organizations: Not on file     Relationship status: Not on file   Other Topics Concern    Not on file   Social History Narrative    Not on file       ROS:  GENERAL: No fever, chills, fatigability or weight loss.  Integument: No rashes, redness, icterus  CHEST: Denies EHADLEY, cyanosis, wheezing, cough and sputum production.  CARDIOVASCULAR: Denies chest pain, PND, orthopnea or reduced exercise tolerance.  GI: Denies abd pain, dysphagia, nausea, vomiting, no hematemesis   : Denies burning on urination, no hematuria, no bacteriuria  MSK: No deformities, swelling, joint pain swelling  Neurologic: No HAs, seizures, weakness, paresthesias, gait problems    PE:  No exam      Labs  Hgb 11.7          Assessment:  Liver function - INR slight increase.  Off ETOH  Recent constipation  Symptomatic hemorrhoids  History of recurrent diverticulitis, multiple bouts  History of iron deficiency anemia.    H/o small pulm nodule   Loose  stools, lower abd pain, fever, chills fatigue - rule out COVID      Plan:  Rule out COVID due to GI symptoms, chills, fever and fatigue  CT chest fu nodule  CT abd/ pelvis - evaluate diverticulitis  May need repeat colonoscopy  Liver w/u per Hepatology

## 2020-05-08 LAB — TTG IGA SER-ACNC: 9 UNITS

## 2020-05-14 ENCOUNTER — LAB VISIT (OUTPATIENT)
Dept: LAB | Facility: HOSPITAL | Age: 75
End: 2020-05-14
Attending: INTERNAL MEDICINE
Payer: COMMERCIAL

## 2020-05-14 ENCOUNTER — HOSPITAL ENCOUNTER (OUTPATIENT)
Dept: RADIOLOGY | Facility: HOSPITAL | Age: 75
Discharge: HOME | End: 2020-05-14
Attending: COLON & RECTAL SURGERY
Payer: COMMERCIAL

## 2020-05-14 DIAGNOSIS — R19.7 DIARRHEA, UNSPECIFIED TYPE: ICD-10-CM

## 2020-05-14 DIAGNOSIS — R19.5 LOOSE STOOLS: ICD-10-CM

## 2020-05-14 DIAGNOSIS — R53.83 FATIGUE, UNSPECIFIED TYPE: ICD-10-CM

## 2020-05-14 DIAGNOSIS — K57.32 DIVERTICULITIS LARGE INTESTINE W/O PERFORATION OR ABSCESS W/O BLEEDING: ICD-10-CM

## 2020-05-14 DIAGNOSIS — Z01.818 PREOPERATIVE TESTING: Primary | ICD-10-CM

## 2020-05-14 LAB — SARS-COV-2 IGG SERPLBLD QL IA.RAPID: NEGATIVE

## 2020-05-14 PROCEDURE — 74177 CT ABD & PELVIS W/CONTRAST: CPT | Mod: 26,,, | Performed by: RADIOLOGY

## 2020-05-14 PROCEDURE — 25500020 PHARM REV CODE 255: Performed by: COLON & RECTAL SURGERY

## 2020-05-14 PROCEDURE — 74177 CT CHEST ABDOMEN PELVIS WITH CONTRAST (XPD): ICD-10-PCS | Mod: 26,,, | Performed by: RADIOLOGY

## 2020-05-14 PROCEDURE — 86769 SARS-COV-2 COVID-19 ANTIBODY: CPT

## 2020-05-14 PROCEDURE — 71260 CT CHEST ABDOMEN PELVIS WITH CONTRAST (XPD): ICD-10-PCS | Mod: 26,,, | Performed by: RADIOLOGY

## 2020-05-14 PROCEDURE — 71260 CT THORAX DX C+: CPT | Mod: 26,,, | Performed by: RADIOLOGY

## 2020-05-14 PROCEDURE — 74177 CT ABD & PELVIS W/CONTRAST: CPT | Mod: TC

## 2020-05-14 PROCEDURE — 36415 COLL VENOUS BLD VENIPUNCTURE: CPT

## 2020-05-14 RX ADMIN — IOHEXOL 75 ML: 350 INJECTION, SOLUTION INTRAVENOUS at 03:05

## 2020-05-14 RX ADMIN — IOHEXOL 15 ML: 350 INJECTION, SOLUTION INTRAVENOUS at 02:05

## 2020-05-14 RX ADMIN — IOHEXOL 15 ML: 350 INJECTION, SOLUTION INTRAVENOUS at 01:05

## 2020-05-15 ENCOUNTER — HOSPITAL ENCOUNTER (OUTPATIENT)
Dept: RADIOLOGY | Facility: HOSPITAL | Age: 75
Discharge: HOME | End: 2020-05-15
Attending: INTERNAL MEDICINE
Payer: COMMERCIAL

## 2020-05-15 DIAGNOSIS — K74.00 HEPATIC FIBROSIS: ICD-10-CM

## 2020-05-15 PROCEDURE — 76391 MR ELASTOGRAPHY: ICD-10-PCS | Mod: 26,,, | Performed by: RADIOLOGY

## 2020-05-15 PROCEDURE — 76391 MR ELASTOGRAPHY: CPT | Mod: TC

## 2020-05-15 PROCEDURE — 76391 MR ELASTOGRAPHY: CPT | Mod: 26,,, | Performed by: RADIOLOGY

## 2020-05-17 ENCOUNTER — TELEPHONE (OUTPATIENT)
Dept: ENDOSCOPY | Facility: HOSPITAL | Age: 75
End: 2020-05-17

## 2020-05-17 ENCOUNTER — PATIENT MESSAGE (OUTPATIENT)
Dept: GASTROENTEROLOGY | Facility: CLINIC | Age: 75
End: 2020-05-17

## 2020-05-17 DIAGNOSIS — E04.1 LEFT THYROID NODULE: Primary | ICD-10-CM

## 2020-05-17 DIAGNOSIS — T78.40XA ALLERGIC STATE, INITIAL ENCOUNTER: ICD-10-CM

## 2020-05-17 DIAGNOSIS — R09.89 CHRONIC THROAT CLEARING: ICD-10-CM

## 2020-05-18 ENCOUNTER — PATIENT MESSAGE (OUTPATIENT)
Dept: OTOLARYNGOLOGY | Facility: CLINIC | Age: 75
End: 2020-05-18

## 2020-05-18 ENCOUNTER — PATIENT MESSAGE (OUTPATIENT)
Dept: HEPATOLOGY | Facility: CLINIC | Age: 75
End: 2020-05-18

## 2020-05-19 ENCOUNTER — PATIENT MESSAGE (OUTPATIENT)
Dept: GASTROENTEROLOGY | Facility: CLINIC | Age: 75
End: 2020-05-19

## 2020-05-19 ENCOUNTER — TELEPHONE (OUTPATIENT)
Dept: SURGERY | Facility: CLINIC | Age: 75
End: 2020-05-19

## 2020-05-19 RX ORDER — AMOXICILLIN AND CLAVULANATE POTASSIUM 875; 125 MG/1; MG/1
1 TABLET, FILM COATED ORAL EVERY 12 HOURS
Qty: 28 TABLET | Refills: 0 | Status: SHIPPED | OUTPATIENT
Start: 2020-05-19 | End: 2020-11-10

## 2020-05-19 NOTE — TELEPHONE ENCOUNTER
"He reports that his symptoms of diarrhea and mild GI discomfort have resolved.  No fever ever although he had felt "warm."  Never has had cough.  Fatigue at end of day may have been due to activity during day.   Feels his symptoms may have been misinterpreted.  I do not feel that Covid testing is warranted.    Will offer him Augmentin given his CT scan findings.    "

## 2020-05-19 NOTE — TELEPHONE ENCOUNTER
Called and informed patient that his MR elastography was normal - no liver scarring. Pt expressed understanding.

## 2020-05-28 ENCOUNTER — OFFICE VISIT (OUTPATIENT)
Dept: OTOLARYNGOLOGY | Facility: CLINIC | Age: 75
End: 2020-05-28
Payer: COMMERCIAL

## 2020-05-28 ENCOUNTER — TELEPHONE (OUTPATIENT)
Dept: INTERNAL MEDICINE | Facility: CLINIC | Age: 75
End: 2020-05-28

## 2020-05-28 VITALS
HEART RATE: 75 BPM | SYSTOLIC BLOOD PRESSURE: 129 MMHG | BODY MASS INDEX: 25.1 KG/M2 | TEMPERATURE: 99 F | WEIGHT: 163.13 LBS | DIASTOLIC BLOOD PRESSURE: 73 MMHG

## 2020-05-28 DIAGNOSIS — E04.1 LEFT THYROID NODULE: ICD-10-CM

## 2020-05-28 DIAGNOSIS — E04.1 THYROID NODULE: Primary | ICD-10-CM

## 2020-05-28 DIAGNOSIS — J31.0 CHRONIC NONALLERGIC RHINITIS: ICD-10-CM

## 2020-05-28 PROCEDURE — 1159F MED LIST DOCD IN RCRD: CPT | Mod: S$GLB,,, | Performed by: OTOLARYNGOLOGY

## 2020-05-28 PROCEDURE — 1101F PT FALLS ASSESS-DOCD LE1/YR: CPT | Mod: CPTII,S$GLB,, | Performed by: OTOLARYNGOLOGY

## 2020-05-28 PROCEDURE — 99999 PR PBB SHADOW E&M-EST. PATIENT-LVL IV: ICD-10-PCS | Mod: PBBFAC,,, | Performed by: OTOLARYNGOLOGY

## 2020-05-28 PROCEDURE — 99203 PR OFFICE/OUTPT VISIT, NEW, LEVL III, 30-44 MIN: ICD-10-PCS | Mod: S$GLB,,, | Performed by: OTOLARYNGOLOGY

## 2020-05-28 PROCEDURE — 99999 PR PBB SHADOW E&M-EST. PATIENT-LVL IV: CPT | Mod: PBBFAC,,, | Performed by: OTOLARYNGOLOGY

## 2020-05-28 PROCEDURE — 1126F PR PAIN SEVERITY QUANTIFIED, NO PAIN PRESENT: ICD-10-PCS | Mod: S$GLB,,, | Performed by: OTOLARYNGOLOGY

## 2020-05-28 PROCEDURE — 1126F AMNT PAIN NOTED NONE PRSNT: CPT | Mod: S$GLB,,, | Performed by: OTOLARYNGOLOGY

## 2020-05-28 PROCEDURE — 3078F DIAST BP <80 MM HG: CPT | Mod: CPTII,S$GLB,, | Performed by: OTOLARYNGOLOGY

## 2020-05-28 PROCEDURE — 1101F PR PT FALLS ASSESS DOC 0-1 FALLS W/OUT INJ PAST YR: ICD-10-PCS | Mod: CPTII,S$GLB,, | Performed by: OTOLARYNGOLOGY

## 2020-05-28 PROCEDURE — 1159F PR MEDICATION LIST DOCUMENTED IN MEDICAL RECORD: ICD-10-PCS | Mod: S$GLB,,, | Performed by: OTOLARYNGOLOGY

## 2020-05-28 PROCEDURE — 3074F PR MOST RECENT SYSTOLIC BLOOD PRESSURE < 130 MM HG: ICD-10-PCS | Mod: CPTII,S$GLB,, | Performed by: OTOLARYNGOLOGY

## 2020-05-28 PROCEDURE — 3074F SYST BP LT 130 MM HG: CPT | Mod: CPTII,S$GLB,, | Performed by: OTOLARYNGOLOGY

## 2020-05-28 PROCEDURE — 3078F PR MOST RECENT DIASTOLIC BLOOD PRESSURE < 80 MM HG: ICD-10-PCS | Mod: CPTII,S$GLB,, | Performed by: OTOLARYNGOLOGY

## 2020-05-28 PROCEDURE — 99203 OFFICE O/P NEW LOW 30 MIN: CPT | Mod: S$GLB,,, | Performed by: OTOLARYNGOLOGY

## 2020-05-28 RX ORDER — FLUTICASONE PROPIONATE 50 MCG
2 SPRAY, SUSPENSION (ML) NASAL DAILY
Qty: 16 G | Refills: 0 | Status: SHIPPED | OUTPATIENT
Start: 2020-05-28 | End: 2020-06-23

## 2020-05-28 NOTE — PROGRESS NOTES
[facility]    Subjective:       Patient ID: Alejandro Wayne is a 75 y.o. male.    Chief Complaint: referral Dr. Gaspar    HPI  Alejandro Wayne is a 75 y.o. male who presents as a referral from Dr. Mathews for an incidentally detected left thyroid nodule during a workup for diverticulitis - he has surgery planned for later this summer. He is a longtime [facility] patient, formerly with Dr. Crews but has not had any thyroid issues. He complains of a chronic cough which he attributes to postnasal drip.  He denies dysphagia, odynophagia, throat pain, and otalgia. His voice is normal. There is no hemoptysis or hematemesis. He is breathing well. He has not noted any neck swelling. There is no personal or family history of thyroid disease or cancer, and he has not had radiation exposure to his knowledge. He has not had prior thyroid imaging.    Past Medical History:   Diagnosis Date    Basal cell carcinoma     BPH (benign prostatic hyperplasia)     Chronic nonallergic rhinitis 5/28/2020    Colon polyp     Hyperlipidemia     Hypertension     Liver cyst 1/11/2016    Prostate nodule     Squamous cell carcinoma     Thyroid nodule 5/28/2020     Past Surgical History:   Procedure Laterality Date    APPENDECTOMY      COLONOSCOPY N/A 6/22/2016    Procedure: COLONOSCOPY;  Surgeon: Marco Mathews MD;  Location: [facility] ([address] [place] [address]);  Service: Endoscopy;  Laterality: N/A;    COLONOSCOPY N/A 10/21/2019    Procedure: COLONOSCOPY;  Surgeon: Marco Mathews MD;  Location: [facility] ([address] [place] [address]);  Service: Endoscopy;  Laterality: N/A;  First case of the day with me next available     COLONOSCOPY W/ POLYPECTOMY      ENDOSCOPIC ULTRASOUND OF UPPER GASTROINTESTINAL TRACT N/A 6/24/2019    Procedure: ULTRASOUND, UPPER GI TRACT, ENDOSCOPIC;  Surgeon: Jeremy Saleem MD;  [facility]  Service: Endoscopy;  Laterality: N/A;  For evaluation of low fecal elastase and loose stools and  history of nonspecific finding of the EUS of the pancreas    ESOPHAGOGASTRODUODENOSCOPY      ESOPHAGOGASTRODUODENOSCOPY N/A 6/24/2019    Procedure: EGD (ESOPHAGOGASTRODUODENOSCOPY);  Surgeon: Jeremy Saleem MD;  Location: [facility] (2ND FLR);  Service: Endoscopy;  Laterality: N/A;  For evaluation of iron deficiency anemia    ESOPHAGOGASTRODUODENOSCOPY N/A 10/21/2019    Procedure: EGD (ESOPHAGOGASTRODUODENOSCOPY);  Surgeon: Marco Mathews MD;  Location: [facility] (4TH FLR);  Service: Endoscopy;  Laterality: N/A;  First case of the day with me next available     HERNIA REPAIR      KNEE ARTHROSCOPY W/ DEBRIDEMENT      TONSILLECTOMY, ADENOIDECTOMY      UPPER ENDOSCOPIC ULTRASOUND W/ FNA      VASECTOMY         Current Outpatient Medications:     amLODIPine (NORVASC) 5 MG tablet, Take 1 tablet (5 mg total) by mouth once daily., Disp: 90 tablet, Rfl: 3    amoxicillin-clavulanate 875-125mg (AUGMENTIN) 875-125 mg per tablet, Take 1 tablet by mouth every 12 (twelve) hours., Disp: 28 tablet, Rfl: 0    ascorbic acid (VITAMIN C) 1000 MG tablet, Take 1,000 mg by mouth once daily., Disp: , Rfl:     aspirin (ECOTRIN) 81 MG EC tablet, Take 81 mg by mouth once daily., Disp: , Rfl:     cetirizine (ZYRTEC) 10 MG tablet, Take 10 mg by mouth once daily., Disp: , Rfl:     diphenoxylate-atropine 2.5-0.025 mg (LOMOTIL) 2.5-0.025 mg per tablet, 1-2 pills qid prn diarrhea, Disp: 50 tablet, Rfl: 0    eszopiclone (LUNESTA) 2 MG Tab, TAKE 1 TABLET BY MOUTH EVERY NIGHT AT BEDTIME AS NEEDED FOR SLEEP, Disp: 30 tablet, Rfl: 1    ferrous sulfate (FEOSOL) 325 mg (65 mg iron) Tab tablet, Take 1 tablet (325 mg total) by mouth every 12 (twelve) hours., Disp: 60 tablet, Rfl: 4    ferrous sulfate (FEOSOL) 325 mg (65 mg iron) Tab tablet, Take 1 tablet (325 mg total) by mouth every 12 (twelve) hours., Disp: 60 tablet, Rfl: 4    fluorouracil (EFUDEX) 5 % cream, , Disp: , Rfl:     lipase-protease-amylase (CREON) 36,000-114,000- 180,000  unit CpDR, Take 3 capsules by mouth 3 (three) times daily with meals. One capsule with every snack.  Not to exceed 12 pills a day., Disp: 810 capsule, Rfl: 3    moxifloxacin (AVELOX) 400 mg tablet, Take 1 tablet (400 mg total) by mouth once daily., Disp: 14 tablet, Rfl: 1    multivitamin (MULTIVITAMIN) per tablet, Take 1 tablet by mouth once daily., Disp: , Rfl:     pantoprazole (PROTONIX) 40 MG tablet, Take 1 tablet (40 mg total) by mouth before breakfast. Take one pill every morning 45 minutes before breakfast in the morning., Disp: 90 tablet, Rfl: 3    rosuvastatin (CRESTOR) 10 MG tablet, TAKE 1 TABLET(10 MG) BY MOUTH EVERY DAY, Disp: 90 tablet, Rfl: 3    rosuvastatin (CRESTOR) 20 MG tablet, , Disp: , Rfl: 3    triamcinolone acetonide 0.1% (KENALOG) 0.1 % ointment, APPLY A SMALL AMOUNT TO THE AFFECTED AREA QD UTD, Disp: , Rfl: 1    vitamin E 1000 UNIT capsule, Take 1,000 Units by mouth once daily., Disp: , Rfl:     fluticasone propionate (FLONASE) 50 mcg/actuation nasal spray, 2 sprays (100 mcg total) by Each Nostril route once daily., Disp: 16 g, Rfl: 0    tadalafil (CIALIS) 20 MG Tab, Take 1 tablet (20 mg total) by mouth daily as needed., Disp: 10 tablet, Rfl: 11    Review of patient's allergies indicates:   Allergen Reactions    Meloxicam      iarrhea     Social History     Socioeconomic History    Marital status:      Spouse name: Not on file    Number of children: Not on file    Years of education: Not on file    Highest education level: Not on file   Occupational History    Not on file   Social Needs    Financial resource strain: Not on file    Food insecurity:     Worry: Not on file     Inability: Not on file    Transportation needs:     Medical: Not on file     Non-medical: Not on file   Tobacco Use    Smoking status: Former Smoker     Packs/day: 0.50     Years: 4.00     Pack years: 2.00     Types: Cigarettes     Last attempt to quit: 1966     Years since quittin.0     Smokeless tobacco: Never Used    Tobacco comment: as teenager   Substance and Sexual Activity    Alcohol use: Yes     Alcohol/week: 14.0 standard drinks     Types: 14 Glasses of wine per week     Comment: social    Drug use: No    Sexual activity: Yes     Partners: Female   Lifestyle    Physical activity:     Days per week: Not on file     Minutes per session: Not on file    Stress: Not on file   Relationships    Social connections:     Talks on phone: Not on file     Gets together: Not on file     Attends Taoist service: Not on file     Active member of club or organization: Not on file     Attends meetings of clubs or organizations: Not on file     Relationship status: Not on file   Other Topics Concern    Not on file   Social History Narrative    Not on file     Family History   Problem Relation Age of Onset    Cancer Mother         melanoma    Cancer Father         skin cancer    Celiac disease Neg Hx     Cirrhosis Neg Hx     Colon cancer Neg Hx     Colon polyps Neg Hx     Crohn's disease Neg Hx     Esophageal cancer Neg Hx     Inflammatory bowel disease Neg Hx     Irritable bowel syndrome Neg Hx     Liver cancer Neg Hx     Rectal cancer Neg Hx     Stomach cancer Neg Hx     Ulcerative colitis Neg Hx      Review of Systems   Constitutional: Negative for fatigue, fever and unexpected weight change.   HENT: Negative for ear discharge, facial swelling, hearing loss, mouth sores, rhinorrhea, sore throat, tinnitus, trouble swallowing and voice change.    Eyes: Negative for pain and visual disturbance.   Respiratory: Negative for cough and shortness of breath.    Cardiovascular: Negative for chest pain and palpitations.   Gastrointestinal: Negative for abdominal pain, constipation and diarrhea.        Ongoing workup for diverticulitis     Genitourinary: Negative for difficulty urinating and dysuria.   Musculoskeletal: Positive for arthralgias. Negative for back pain and neck pain.   Skin:  Negative for color change and rash.   Neurological: Negative for dizziness, seizures and headaches.   Hematological: Negative for adenopathy. Does not bruise/bleed easily.   Psychiatric/Behavioral: Negative for agitation. The patient is not nervous/anxious.        Objective:     Physical Exam   Constitutional: He is oriented to person, place, and time. He appears well-developed and well-nourished. He is cooperative.   HENT:   Head: Normocephalic and atraumatic.   Right Ear: Hearing, tympanic membrane, external ear and ear canal normal.   Left Ear: Hearing, tympanic membrane, external ear and ear canal normal.   Nose: Nose normal. No rhinorrhea, nasal deformity or septal deviation. Right sinus exhibits no maxillary sinus tenderness and no frontal sinus tenderness. Left sinus exhibits no maxillary sinus tenderness and no frontal sinus tenderness.   Mouth/Throat: Uvula is midline, oropharynx is clear and moist and mucous membranes are normal. He does not have dentures. No oral lesions. No trismus in the jaw. No oropharyngeal exudate or posterior oropharyngeal edema.   .   Eyes: Pupils are equal, round, and reactive to light. Conjunctivae and EOM are normal. Right eye exhibits no chemosis. Left eye exhibits no chemosis.   Neck: Trachea normal, normal range of motion and phonation normal. Neck supple. No JVD present. No tracheal tenderness present. No tracheal deviation and no edema present. No thyroid mass and no thyromegaly present.       Salivary glands - there are no lesions, and there is no asymmetry of the parotid and submandibular glands   Cardiovascular: Normal rate, regular rhythm and intact distal pulses.   Pulmonary/Chest: Effort normal. No accessory muscle usage or stridor. No tachypnea. No respiratory distress.   Abdominal: Normal appearance. He exhibits no distension. There is no guarding.   Lymphadenopathy:     He has no cervical adenopathy.        Right cervical: No deep cervical and no posterior cervical  adenopathy present.       Left cervical: No deep cervical and no posterior cervical adenopathy present.        Right: No supraclavicular adenopathy present.        Left: No supraclavicular adenopathy present.   Neurological: He is alert and oriented to person, place, and time. No cranial nerve deficit. Gait normal.   Skin: Skin is warm and dry. No lesion noted.   Psychiatric: He has a normal mood and affect. His speech is normal.   Vitals reviewed.       Assessment & Plan:       Problem List Items Addressed This Visit     Thyroid nodule - Primary     Alejandro Wayne is a 75 y.o. male with an incidentally detected left thyroid nodule on recent chest imaging. He is completely asymptomatic, outside of a chronic cough related to longstanding postnasal drip. We discussed how common thyroid nodules are in the general population. However, I explained that the vast majority of thyroid nodules are not malignant and that there are certain steps that we have to take to evaluate thyroid nodules for malignancy. He needs an ultrasound, and may need an ultrasound-guided FNA, if additional evaluation is indicated. I will check some baseline labs, too.     Time was allowed for questions, and all questions were answered to his apparent satisfaction.               Relevant Orders    US Soft Tissue Head Neck Thyroid (Completed)    TSH    PTH, intact    T4, free    Chronic nonallergic rhinitis    Relevant Medications    fluticasone propionate (FLONASE) 50 mcg/actuation nasal spray      Other Visit Diagnoses     Left thyroid nodule

## 2020-05-28 NOTE — LETTER
June 2, 2020      Marco Gaspar, DO  [address] [facility]  [facility] [address]           [facility]  [address]  [facility] [address]  Phone: [phone]  Fax: [phone]          Patient: Alejandro Wayne   MR Number: [id number]   YOB: 1945   Date of Visit: 5/28/2020       Dear Dr. Marco Gaspar:    Thank you for referring Alejandro Wayne to me for evaluation. Attached you will find relevant portions of my assessment and plan of care.    If you have questions, please do not hesitate to call me. I look forward to following Alejandro Wayne along with you.    Sincerely,    Yan Kern MD    Enclosure  CC:  No Recipients    If you would like to receive this communication electronically, please contact [email or web address] or [phone] to request more information on [facility] [facility] access.    For providers and/or their staff who would like to refer a patient to [facility], please contact us through our one-stop-shop provider referral line, [facility], at [phone].    If you feel you have received this communication in error or would no longer like to receive these types of communications, please e-mail [email or web address]

## 2020-05-29 ENCOUNTER — HOSPITAL ENCOUNTER (OUTPATIENT)
Dept: RADIOLOGY | Facility: HOSPITAL | Age: 75
Discharge: HOME | End: 2020-05-29
Attending: OTOLARYNGOLOGY
Payer: COMMERCIAL

## 2020-05-29 ENCOUNTER — PATIENT MESSAGE (OUTPATIENT)
Dept: OTOLARYNGOLOGY | Facility: CLINIC | Age: 75
End: 2020-05-29

## 2020-05-29 DIAGNOSIS — E04.1 THYROID NODULE: ICD-10-CM

## 2020-05-29 DIAGNOSIS — E04.1 THYROID NODULE: Primary | ICD-10-CM

## 2020-05-29 PROCEDURE — 76536 US SOFT TISSUE HEAD NECK THYROID: ICD-10-PCS | Mod: 26,,, | Performed by: INTERNAL MEDICINE

## 2020-05-29 PROCEDURE — 76536 US EXAM OF HEAD AND NECK: CPT | Mod: TC

## 2020-05-29 PROCEDURE — 76536 US EXAM OF HEAD AND NECK: CPT | Mod: 26,,, | Performed by: INTERNAL MEDICINE

## 2020-05-30 ENCOUNTER — TELEPHONE (OUTPATIENT)
Dept: INTERNAL MEDICINE | Facility: CLINIC | Age: 75
End: 2020-05-30

## 2020-05-30 DIAGNOSIS — Z20.822 EXPOSURE TO COVID-19 VIRUS: Primary | ICD-10-CM

## 2020-06-02 NOTE — ASSESSMENT & PLAN NOTE
Alejandro Wayne is a 75 y.o. male with an incidentally detected left thyroid nodule on recent chest imaging. He is completely asymptomatic, outside of a chronic cough related to longstanding postnasal drip. We discussed how common thyroid nodules are in the general population. However, I explained that the vast majority of thyroid nodules are not malignant and that there are certain steps that we have to take to evaluate thyroid nodules for malignancy. He needs an ultrasound, and may need an ultrasound-guided FNA, if additional evaluation is indicated. I will check some baseline labs, too.     Time was allowed for questions, and all questions were answered to his apparent satisfaction.

## 2020-06-09 ENCOUNTER — TELEPHONE (OUTPATIENT)
Dept: INTERNAL MEDICINE | Facility: CLINIC | Age: 75
End: 2020-06-09

## 2020-06-17 ENCOUNTER — LAB VISIT (OUTPATIENT)
Dept: INTERNAL MEDICINE | Facility: CLINIC | Age: 75
End: 2020-06-17
Payer: COMMERCIAL

## 2020-06-17 DIAGNOSIS — Z20.822 EXPOSURE TO COVID-19 VIRUS: ICD-10-CM

## 2020-06-17 PROCEDURE — U0003 INFECTIOUS AGENT DETECTION BY NUCLEIC ACID (DNA OR RNA); SEVERE ACUTE RESPIRATORY SYNDROME CORONAVIRUS 2 (SARS-COV-2) (CORONAVIRUS DISEASE [COVID-19]), AMPLIFIED PROBE TECHNIQUE, MAKING USE OF HIGH THROUGHPUT TECHNOLOGIES AS DESCRIBED BY CMS-2020-01-R: HCPCS

## 2020-06-18 LAB — SARS-COV-2 RNA RESP QL NAA+PROBE: NOT DETECTED

## 2020-07-14 ENCOUNTER — LAB VISIT (OUTPATIENT)
Dept: INTERNAL MEDICINE | Facility: CLINIC | Age: 75
End: 2020-07-14
Payer: COMMERCIAL

## 2020-07-14 DIAGNOSIS — Z20.828 EXPOSURE TO SARS-ASSOCIATED CORONAVIRUS: ICD-10-CM

## 2020-07-14 PROCEDURE — U0003 INFECTIOUS AGENT DETECTION BY NUCLEIC ACID (DNA OR RNA); SEVERE ACUTE RESPIRATORY SYNDROME CORONAVIRUS 2 (SARS-COV-2) (CORONAVIRUS DISEASE [COVID-19]), AMPLIFIED PROBE TECHNIQUE, MAKING USE OF HIGH THROUGHPUT TECHNOLOGIES AS DESCRIBED BY CMS-2020-01-R: HCPCS

## 2020-07-14 RX ORDER — TADALAFIL 20 MG/1
20 TABLET ORAL DAILY PRN
Qty: 10 TABLET | Refills: 11 | Status: SHIPPED | OUTPATIENT
Start: 2020-07-14 | End: 2020-07-24

## 2020-07-16 LAB — SARS-COV-2 RNA RESP QL NAA+PROBE: NOT DETECTED

## 2020-08-04 ENCOUNTER — TELEPHONE (OUTPATIENT)
Dept: OPTOMETRY | Facility: CLINIC | Age: 75
End: 2020-08-04

## 2020-08-04 ENCOUNTER — OFFICE VISIT (OUTPATIENT)
Dept: OPTOMETRY | Facility: CLINIC | Age: 75
End: 2020-08-04
Payer: COMMERCIAL

## 2020-08-04 DIAGNOSIS — H52.13 MYOPIA WITH ASTIGMATISM AND PRESBYOPIA, BILATERAL: Primary | ICD-10-CM

## 2020-08-04 DIAGNOSIS — H25.13 NUCLEAR SCLEROSIS OF BOTH EYES: ICD-10-CM

## 2020-08-04 DIAGNOSIS — H52.203 MYOPIA WITH ASTIGMATISM AND PRESBYOPIA, BILATERAL: Primary | ICD-10-CM

## 2020-08-04 DIAGNOSIS — H52.4 MYOPIA WITH ASTIGMATISM AND PRESBYOPIA, BILATERAL: Primary | ICD-10-CM

## 2020-08-04 DIAGNOSIS — H40.1111 PRIMARY OPEN ANGLE GLAUCOMA (POAG) OF RIGHT EYE, MILD STAGE: ICD-10-CM

## 2020-08-04 PROCEDURE — 92133 CPTRZD OPH DX IMG PST SGM ON: CPT | Mod: S$GLB,,, | Performed by: OPTOMETRIST

## 2020-08-04 PROCEDURE — 92014 COMPRE OPH EXAM EST PT 1/>: CPT | Mod: S$GLB,,, | Performed by: OPTOMETRIST

## 2020-08-04 PROCEDURE — 92015 DETERMINE REFRACTIVE STATE: CPT | Mod: S$GLB,,, | Performed by: OPTOMETRIST

## 2020-08-04 PROCEDURE — 99999 PR PBB SHADOW E&M-EST. PATIENT-LVL III: ICD-10-PCS | Mod: PBBFAC,,, | Performed by: OPTOMETRIST

## 2020-08-04 PROCEDURE — 92014 PR EYE EXAM, EST PATIENT,COMPREHESV: ICD-10-PCS | Mod: S$GLB,,, | Performed by: OPTOMETRIST

## 2020-08-04 PROCEDURE — 92133 OCT, OPTIC NERVE - OU - BOTH EYES: ICD-10-PCS | Mod: S$GLB,,, | Performed by: OPTOMETRIST

## 2020-08-04 PROCEDURE — 99999 PR PBB SHADOW E&M-EST. PATIENT-LVL III: CPT | Mod: PBBFAC,,, | Performed by: OPTOMETRIST

## 2020-08-04 PROCEDURE — 92015 PR REFRACTION: ICD-10-PCS | Mod: S$GLB,,, | Performed by: OPTOMETRIST

## 2020-08-04 RX ORDER — LATANOPROST 50 UG/ML
1 SOLUTION/ DROPS OPHTHALMIC NIGHTLY
Qty: 2.5 ML | Refills: 4 | Status: SHIPPED | OUTPATIENT
Start: 2020-08-04 | End: 2020-12-24

## 2020-08-04 NOTE — PROGRESS NOTES
HPI     Mr. Alejandro Wayne was referred by self for a complete exam.    Patient complains of broken glasses. Currently wearing a very old pair   today. No complaints today, just needs new glasses.    Would patient like a refraction today? yes     Paitent denies diplopia, headaches, flashes/floaters, itching, tearing,   burning, redness, and pain.    (-)drops  (-)diabetes    OCULAR HISTORY  Last Eye Exam: 3 years with [facility]  (-)eye surgery   (-)diagnosed or treated for any eye conditions or diseases, n/a    FAMILY HISTORY  (-)Glaucoma        Last edited by Kylee Sommers, OD on 8/4/2020  1:28 PM. (History)            Assessment /Plan     For exam results, see Encounter Report.    Myopia with astigmatism and presbyopia, bilateral    Primary open angle glaucoma (POAG) of right eye, mild stage  -     OCT, Optic Nerve - OU - Both Eyes; Future    Nuclear sclerosis of both eyes      1. Updated SRx. Moderate change from habitual. Monitor yearly.     2. Educated pt. C/d ratio OD>OS. Last eye exam ~3 years ago was WNL pet pt. (-)FHx. WNL IOP. Will order Baseline RNFL OCT.     RNFL OCT  OD: Significant RNFL thinning TI and G  OS: WNL. (-)RNFL thinning    Called patient with findings. Rx latanoprost 1 gtt OD only, QHS. Will monitor 1 month with IOP check + HVF.     3. Educated pt on findings. Mild to moderate NS OU. Causing decrease in BCVA (20/25 OD and 20/25 OS) however pt asymptomatic. No need for removal at this time. Monitor yearly.       RTC in 1 month for IOP check + HVF or sooner if needed.

## 2020-08-28 ENCOUNTER — LAB VISIT (OUTPATIENT)
Dept: INTERNAL MEDICINE | Facility: CLINIC | Age: 75
End: 2020-08-28
Payer: COMMERCIAL

## 2020-08-28 DIAGNOSIS — Z20.828 EXPOSURE TO SARS-ASSOCIATED CORONAVIRUS: ICD-10-CM

## 2020-08-28 LAB — SARS-COV-2 RNA RESP QL NAA+PROBE: NOT DETECTED

## 2020-08-28 PROCEDURE — U0003 INFECTIOUS AGENT DETECTION BY NUCLEIC ACID (DNA OR RNA); SEVERE ACUTE RESPIRATORY SYNDROME CORONAVIRUS 2 (SARS-COV-2) (CORONAVIRUS DISEASE [COVID-19]), AMPLIFIED PROBE TECHNIQUE, MAKING USE OF HIGH THROUGHPUT TECHNOLOGIES AS DESCRIBED BY CMS-2020-01-R: HCPCS

## 2020-09-11 DIAGNOSIS — H40.1111 PRIMARY OPEN ANGLE GLAUCOMA (POAG) OF RIGHT EYE, MILD STAGE: Primary | ICD-10-CM

## 2020-09-15 ENCOUNTER — OFFICE VISIT (OUTPATIENT)
Dept: OPTOMETRY | Facility: CLINIC | Age: 75
End: 2020-09-15
Payer: COMMERCIAL

## 2020-09-15 ENCOUNTER — CLINICAL SUPPORT (OUTPATIENT)
Dept: OPHTHALMOLOGY | Facility: CLINIC | Age: 75
End: 2020-09-15
Payer: COMMERCIAL

## 2020-09-15 DIAGNOSIS — H40.1111 PRIMARY OPEN ANGLE GLAUCOMA (POAG) OF RIGHT EYE, MILD STAGE: ICD-10-CM

## 2020-09-15 PROCEDURE — 92083 EXTENDED VISUAL FIELD XM: CPT | Mod: S$GLB,,, | Performed by: OPTOMETRIST

## 2020-09-15 PROCEDURE — 99999 PR PBB SHADOW E&M-EST. PATIENT-LVL II: ICD-10-PCS | Mod: PBBFAC,,, | Performed by: OPTOMETRIST

## 2020-09-15 PROCEDURE — 99999 PR PBB SHADOW E&M-EST. PATIENT-LVL II: CPT | Mod: PBBFAC,,, | Performed by: OPTOMETRIST

## 2020-09-15 PROCEDURE — 92083 CONJUNCTIVORHINOSTOMY, W/O TUBE - OS - LEFT EYE: ICD-10-PCS | Mod: S$GLB,,, | Performed by: OPTOMETRIST

## 2020-09-15 PROCEDURE — 92012 INTRM OPH EXAM EST PATIENT: CPT | Mod: S$GLB,,, | Performed by: OPTOMETRIST

## 2020-09-15 PROCEDURE — 92012 PR EYE EXAM, EST PATIENT,INTERMED: ICD-10-PCS | Mod: S$GLB,,, | Performed by: OPTOMETRIST

## 2020-09-15 NOTE — PROGRESS NOTES
HVF done ou/rel/fix/coop. Good ou/chart checked and asked patient about latex allergy, none noted./Rx used: -2.50 + 3.50 x 15 /od -2.75 + 3.25 x 155/os-[facility]

## 2020-09-15 NOTE — PROGRESS NOTES
HPI     Patient in for progress check.    Being followed for (diagnosis):   IOP check + HVF today    Date last seen:  8/4/20    Doctor last seen:  Dr Sommers    Prescribed eye medications(s) using:  Latanoprost QHS OD. HAS NO USED GTT   IN THE PAST 6 DAYS. He left his gtts in [place]    OTC eye medication(s) using:  None    Signs/symptoms of condition resolved/better/stable/worse?:  Unchanged    Last edited by Kylee Sommers, OD on 9/15/2020 12:14 PM. (History)            Assessment /Plan     For exam results, see Encounter Report.    Primary open angle glaucoma (POAG) of right eye, mild stage  -     Russo Visual Field - Intermediate - OU - Both Eyes      Educated pt on findings. Due to noncompliance with gtt, IOP check unreliable. IOP OD today 18, last exam (08/04/2020) was 16. However patient hasnt used latanoprost in 6-7 days.   Will re-schedule IOP check and f/u in ~1 month. Discussed importance of gtt therapy and risk for LOV/blindness if glaucoma is not controlled.   HVF preformed today. See results below. Reviewed with patient. Correlation with HVF defect and previous OCT findings.     24-2 HVF  OD: Reliable scan (FL: 2/11, FP: 0%, FN: 0%, GHT: ONL, VFI: 94%). Superior nasal steps with early superior arcuate. Correlates with inferior temporal thinning on previous OCT. Monitor for repeatability and possible progression.   OS: Unreliable scan (FL: 3/10xx, FP: 11%, FN: 0%, GHT: WNL, VFI: 100%). WNL. (-)visual field defects.       RTC in 1 month for IOP check, repeat HVF/OCT in 3-4 months.

## 2020-09-20 ENCOUNTER — PATIENT MESSAGE (OUTPATIENT)
Dept: SURGERY | Facility: CLINIC | Age: 75
End: 2020-09-20

## 2020-09-29 ENCOUNTER — PATIENT MESSAGE (OUTPATIENT)
Dept: OPTOMETRY | Facility: CLINIC | Age: 75
End: 2020-09-29

## 2020-09-30 ENCOUNTER — PATIENT MESSAGE (OUTPATIENT)
Dept: ENDOSCOPY | Facility: HOSPITAL | Age: 75
End: 2020-09-30

## 2020-09-30 ENCOUNTER — LAB VISIT (OUTPATIENT)
Dept: LAB | Facility: HOSPITAL | Age: 75
End: 2020-09-30
Attending: COLON & RECTAL SURGERY
Payer: COMMERCIAL

## 2020-09-30 ENCOUNTER — TELEPHONE (OUTPATIENT)
Dept: ENDOSCOPY | Facility: HOSPITAL | Age: 75
End: 2020-09-30

## 2020-09-30 ENCOUNTER — OFFICE VISIT (OUTPATIENT)
Dept: SURGERY | Facility: CLINIC | Age: 75
End: 2020-09-30
Payer: COMMERCIAL

## 2020-09-30 VITALS
HEIGHT: 66 IN | DIASTOLIC BLOOD PRESSURE: 80 MMHG | SYSTOLIC BLOOD PRESSURE: 125 MMHG | BODY MASS INDEX: 26.62 KG/M2 | WEIGHT: 165.63 LBS

## 2020-09-30 DIAGNOSIS — K62.89 PROCTITIS: ICD-10-CM

## 2020-09-30 DIAGNOSIS — K57.32 DIVERTICULITIS OF LARGE INTESTINE, UNSPECIFIED BLEEDING STATUS, UNSPECIFIED COMPLICATION STATUS: ICD-10-CM

## 2020-09-30 DIAGNOSIS — K57.32 DIVERTICULITIS OF LARGE INTESTINE, UNSPECIFIED BLEEDING STATUS, UNSPECIFIED COMPLICATION STATUS: Primary | ICD-10-CM

## 2020-09-30 DIAGNOSIS — Z12.11 SPECIAL SCREENING FOR MALIGNANT NEOPLASMS, COLON: Primary | ICD-10-CM

## 2020-09-30 DIAGNOSIS — K62.5 ANAL BLEEDING: ICD-10-CM

## 2020-09-30 LAB
ALBUMIN SERPL BCP-MCNC: 3.9 G/DL (ref 3.5–5.2)
ALP SERPL-CCNC: 68 U/L (ref 55–135)
ALT SERPL W/O P-5'-P-CCNC: 20 U/L (ref 10–44)
ANION GAP SERPL CALC-SCNC: 9 MMOL/L (ref 8–16)
AST SERPL-CCNC: 19 U/L (ref 10–40)
BASOPHILS # BLD AUTO: 0.12 K/UL (ref 0–0.2)
BASOPHILS NFR BLD: 1.6 % (ref 0–1.9)
BILIRUB SERPL-MCNC: 0.6 MG/DL (ref 0.1–1)
BUN SERPL-MCNC: 12 MG/DL (ref 8–23)
CALCIUM SERPL-MCNC: 8.9 MG/DL (ref 8.7–10.5)
CHLORIDE SERPL-SCNC: 101 MMOL/L (ref 95–110)
CO2 SERPL-SCNC: 25 MMOL/L (ref 23–29)
CREAT SERPL-MCNC: 0.7 MG/DL (ref 0.5–1.4)
CRP SERPL-MCNC: 13.9 MG/L (ref 0–8.2)
DIFFERENTIAL METHOD: ABNORMAL
EOSINOPHIL # BLD AUTO: 0.3 K/UL (ref 0–0.5)
EOSINOPHIL NFR BLD: 3.9 % (ref 0–8)
ERYTHROCYTE [DISTWIDTH] IN BLOOD BY AUTOMATED COUNT: 13.3 % (ref 11.5–14.5)
EST. GFR  (AFRICAN AMERICAN): >60 ML/MIN/1.73 M^2
EST. GFR  (NON AFRICAN AMERICAN): >60 ML/MIN/1.73 M^2
GLUCOSE SERPL-MCNC: 102 MG/DL (ref 70–110)
HCT VFR BLD AUTO: 40.7 % (ref 40–54)
HGB BLD-MCNC: 12.8 G/DL (ref 14–18)
IMM GRANULOCYTES # BLD AUTO: 0.04 K/UL (ref 0–0.04)
IMM GRANULOCYTES NFR BLD AUTO: 0.5 % (ref 0–0.5)
LYMPHOCYTES # BLD AUTO: 0.9 K/UL (ref 1–4.8)
LYMPHOCYTES NFR BLD: 12.1 % (ref 18–48)
MCH RBC QN AUTO: 28.1 PG (ref 27–31)
MCHC RBC AUTO-ENTMCNC: 31.4 G/DL (ref 32–36)
MCV RBC AUTO: 89 FL (ref 82–98)
MONOCYTES # BLD AUTO: 0.8 K/UL (ref 0.3–1)
MONOCYTES NFR BLD: 10.6 % (ref 4–15)
NEUTROPHILS # BLD AUTO: 5.5 K/UL (ref 1.8–7.7)
NEUTROPHILS NFR BLD: 71.3 % (ref 38–73)
NRBC BLD-RTO: 0 /100 WBC
PLATELET # BLD AUTO: 280 K/UL (ref 150–350)
PMV BLD AUTO: 9.5 FL (ref 9.2–12.9)
POTASSIUM SERPL-SCNC: 4.2 MMOL/L (ref 3.5–5.1)
PREALB SERPL-MCNC: 18 MG/DL (ref 20–43)
PROT SERPL-MCNC: 7.3 G/DL (ref 6–8.4)
RBC # BLD AUTO: 4.56 M/UL (ref 4.6–6.2)
SODIUM SERPL-SCNC: 135 MMOL/L (ref 136–145)
WBC # BLD AUTO: 7.7 K/UL (ref 3.9–12.7)

## 2020-09-30 PROCEDURE — 1126F PR PAIN SEVERITY QUANTIFIED, NO PAIN PRESENT: ICD-10-PCS | Mod: S$GLB,,, | Performed by: COLON & RECTAL SURGERY

## 2020-09-30 PROCEDURE — 36415 COLL VENOUS BLD VENIPUNCTURE: CPT

## 2020-09-30 PROCEDURE — 85025 COMPLETE CBC W/AUTO DIFF WBC: CPT

## 2020-09-30 PROCEDURE — 99213 OFFICE O/P EST LOW 20 MIN: CPT | Mod: 25,S$GLB,, | Performed by: COLON & RECTAL SURGERY

## 2020-09-30 PROCEDURE — 3074F PR MOST RECENT SYSTOLIC BLOOD PRESSURE < 130 MM HG: ICD-10-PCS | Mod: CPTII,S$GLB,, | Performed by: COLON & RECTAL SURGERY

## 2020-09-30 PROCEDURE — 46600 PR DIAG2STIC A2SCOPY: ICD-10-PCS | Mod: S$GLB,,, | Performed by: COLON & RECTAL SURGERY

## 2020-09-30 PROCEDURE — 1101F PR PT FALLS ASSESS DOC 0-1 FALLS W/OUT INJ PAST YR: ICD-10-PCS | Mod: CPTII,S$GLB,, | Performed by: COLON & RECTAL SURGERY

## 2020-09-30 PROCEDURE — 80053 COMPREHEN METABOLIC PANEL: CPT

## 2020-09-30 PROCEDURE — 99213 PR OFFICE/OUTPT VISIT, EST, LEVL III, 20-29 MIN: ICD-10-PCS | Mod: 25,S$GLB,, | Performed by: COLON & RECTAL SURGERY

## 2020-09-30 PROCEDURE — 1126F AMNT PAIN NOTED NONE PRSNT: CPT | Mod: S$GLB,,, | Performed by: COLON & RECTAL SURGERY

## 2020-09-30 PROCEDURE — 84134 ASSAY OF PREALBUMIN: CPT

## 2020-09-30 PROCEDURE — 1101F PT FALLS ASSESS-DOCD LE1/YR: CPT | Mod: CPTII,S$GLB,, | Performed by: COLON & RECTAL SURGERY

## 2020-09-30 PROCEDURE — 1159F PR MEDICATION LIST DOCUMENTED IN MEDICAL RECORD: ICD-10-PCS | Mod: S$GLB,,, | Performed by: COLON & RECTAL SURGERY

## 2020-09-30 PROCEDURE — 46600 DIAGNOSTIC ANOSCOPY SPX: CPT | Mod: S$GLB,,, | Performed by: COLON & RECTAL SURGERY

## 2020-09-30 PROCEDURE — 3079F DIAST BP 80-89 MM HG: CPT | Mod: CPTII,S$GLB,, | Performed by: COLON & RECTAL SURGERY

## 2020-09-30 PROCEDURE — 99999 PR PBB SHADOW E&M-EST. PATIENT-LVL IV: CPT | Mod: PBBFAC,,, | Performed by: COLON & RECTAL SURGERY

## 2020-09-30 PROCEDURE — 99999 PR PBB SHADOW E&M-EST. PATIENT-LVL IV: ICD-10-PCS | Mod: PBBFAC,,, | Performed by: COLON & RECTAL SURGERY

## 2020-09-30 PROCEDURE — 86140 C-REACTIVE PROTEIN: CPT

## 2020-09-30 PROCEDURE — 1159F MED LIST DOCD IN RCRD: CPT | Mod: S$GLB,,, | Performed by: COLON & RECTAL SURGERY

## 2020-09-30 PROCEDURE — 3074F SYST BP LT 130 MM HG: CPT | Mod: CPTII,S$GLB,, | Performed by: COLON & RECTAL SURGERY

## 2020-09-30 PROCEDURE — 3079F PR MOST RECENT DIASTOLIC BLOOD PRESSURE 80-89 MM HG: ICD-10-PCS | Mod: CPTII,S$GLB,, | Performed by: COLON & RECTAL SURGERY

## 2020-09-30 RX ORDER — SODIUM, POTASSIUM,MAG SULFATES 17.5-3.13G
1 SOLUTION, RECONSTITUTED, ORAL ORAL DAILY
Qty: 1 KIT | Refills: 0 | Status: SHIPPED | OUTPATIENT
Start: 2020-09-30 | End: 2020-10-02

## 2020-09-30 NOTE — TELEPHONE ENCOUNTER
Patient is scheduled for Colonoscopy procedure on 10/5/20 with Dr. Hall on 4th floor endoscopy unit.    Thanks,  Sintia

## 2020-09-30 NOTE — PROGRESS NOTES
"HPI:  Alejandro Wayne is a 75 y.o. male with history of diverticulitis.   See colonoscopy below    Colonoscopy 9-2019  Impression:           - The examined portion of the ileum was normal.                         Biopsied.                         - Diverticulosis in the recto-sigmoid colon, in the                         sigmoid colon, in the descending colon, in the                         transverse colon and in the ascending colon.                         - Erythematous mucosa in the sigmoid colon.                         - Congested mucosa in the sigmoid colon.                         - Suspect Diverticulitis with pus c/w with acute                         diverticulitis     Denies abd pain.  C/o hemorrhoids.  Protrusion.  Discomfort.  4-5 BMs per day.  Loose to semiformed.  Occasional blood.  No fever but has had "chills."  Wt stable 160 casey.        Past Medical History:   Diagnosis Date    Basal cell carcinoma     BPH (benign prostatic hyperplasia)     Chronic nonallergic rhinitis 5/28/2020    Colon polyp     Hyperlipidemia     Hypertension     Liver cyst 1/11/2016    Prostate nodule     Squamous cell carcinoma     Thyroid nodule 5/28/2020        Past Surgical History:   Procedure Laterality Date    APPENDECTOMY      COLONOSCOPY N/A 6/22/2016    Procedure: COLONOSCOPY;  Surgeon: Marco Mathews MD;  Location: [facility] (4TH FLR);  Service: Endoscopy;  Laterality: N/A;    COLONOSCOPY N/A 10/21/2019    Procedure: COLONOSCOPY;  Surgeon: Marco Mathews MD;  Location: [facility] (4TH FLR);  Service: Endoscopy;  Laterality: N/A;  First case of the day with me next available     COLONOSCOPY W/ POLYPECTOMY      ENDOSCOPIC ULTRASOUND OF UPPER GASTROINTESTINAL TRACT N/A 6/24/2019    Procedure: ULTRASOUND, UPPER GI TRACT, ENDOSCOPIC;  Surgeon: Jeremy Saleem MD;  Location: [facility] (2ND FLR);  Service: Endoscopy;  Laterality: N/A;  For evaluation of low fecal elastase and loose stools and history of " nonspecific finding of the EUS of the pancreas    ESOPHAGOGASTRODUODENOSCOPY      ESOPHAGOGASTRODUODENOSCOPY N/A 2019    Procedure: EGD (ESOPHAGOGASTRODUODENOSCOPY);  Surgeon: Jeremy Saleem MD;  Location: [facility] (2ND FLR);  Service: Endoscopy;  Laterality: N/A;  For evaluation of iron deficiency anemia    ESOPHAGOGASTRODUODENOSCOPY N/A 10/21/2019    Procedure: EGD (ESOPHAGOGASTRODUODENOSCOPY);  Surgeon: Marco Mathews MD;  Location: [facility] (4TH FLR);  Service: Endoscopy;  Laterality: N/A;  First case of the day with me next available     HERNIA REPAIR      KNEE ARTHROSCOPY W/ DEBRIDEMENT      TONSILLECTOMY, ADENOIDECTOMY      UPPER ENDOSCOPIC ULTRASOUND W/ FNA      VASECTOMY         Review of patient's allergies indicates:   Allergen Reactions    Meloxicam      iarrhea       Family History   Problem Relation Age of Onset    Cancer Mother         melanoma    Cancer Father         skin cancer    Celiac disease Neg Hx     Cirrhosis Neg Hx     Colon cancer Neg Hx     Colon polyps Neg Hx     Crohn's disease Neg Hx     Esophageal cancer Neg Hx     Inflammatory bowel disease Neg Hx     Irritable bowel syndrome Neg Hx     Liver cancer Neg Hx     Rectal cancer Neg Hx     Stomach cancer Neg Hx     Ulcerative colitis Neg Hx        Social History     Socioeconomic History    Marital status:      Spouse name: Not on file    Number of children: Not on file    Years of education: Not on file    Highest education level: Not on file   Occupational History    Not on file   Social Needs    Financial resource strain: Not on file    Food insecurity     Worry: Not on file     Inability: Not on file    Transportation needs     Medical: Not on file     Non-medical: Not on file   Tobacco Use    Smoking status: Former Smoker     Packs/day: 0.50     Years: 4.00     Pack years: 2.00     Types: Cigarettes     Quit date: 1966     Years since quittin.4    Smokeless tobacco: Never  "Used    Tobacco comment: as teenager   Substance and Sexual Activity    Alcohol use: Yes     Alcohol/week: 14.0 standard drinks     Types: 14 Glasses of wine per week     Comment: social    Drug use: No    Sexual activity: Yes     Partners: Female   Lifestyle    Physical activity     Days per week: Not on file     Minutes per session: Not on file    Stress: Not on file   Relationships    Social connections     Talks on phone: Not on file     Gets together: Not on file     Attends Rastafarian service: Not on file     Active member of club or organization: Not on file     Attends meetings of clubs or organizations: Not on file     Relationship status: Not on file   Other Topics Concern    Not on file   Social History Narrative    Not on file       ROS:  GENERAL: No fever, chills, fatigability or weight loss.  Integument: No rashes, redness, icterus  CHEST: Denies HEADLEY, cyanosis, wheezing, cough and sputum production.  CARDIOVASCULAR: Denies chest pain, PND, orthopnea or reduced exercise tolerance.  GI: Denies abd pain, dysphagia, nausea, vomiting, no hematemesis   : Denies burning on urination, no hematuria, no bacteriuria  MSK: No deformities, swelling, joint pain swelling  Neurologic: No HAs, seizures, weakness, paresthesias, gait problems    PE:  General appearance well appearing in NAD  /80 (BP Location: Left arm, Patient Position: Sitting, BP Method: Large (Manual))   Ht 5' 6" (1.676 m)   Wt 75.1 kg (165 lb 9.6 oz)   BMI 26.73 kg/m²     Sclera/ Skin anicteric  LN none palpable  AT NC EOMI  Neck supple trachea midline   Chest symmetric, nl excursion, no retractions, breathing comfortably  Abdomen  ND soft NT.  no masses, no organomegaly  EXT - no CCE  Neuro:  Mood/ affect nl, alert and oriented x 3, moves all ext's, gait nl    Rectal  Inspection         Mild protrusion, edema.  No fissure.  No thrombosis  EVE increased tone, ulcerated mucosa.        Assessment:  Proctitis?  H/o " diverticulitis    Plan:  Colonoscopy  Further recommendations based on findings, biopsies    Anoscopy Procedure Note:   Verbal consent obtained    Indications:  History of hemorrhoids    Post procedure diagnosis:  Same, proctitis    Procedure: anoscopy    Surgeon GÉNESIS    Assistant: Donavon    Specimen none    Findings:  Ulcerated mucosa, distal rectum    Right posterior hemorrhoid grade 2  Right anterior hemorrhoid grade 2  Left lateral hemorrhoid grade 2    Pt tolerated procedure well.      No complications.

## 2020-10-02 ENCOUNTER — LAB VISIT (OUTPATIENT)
Dept: SURGERY | Facility: CLINIC | Age: 75
End: 2020-10-02
Payer: COMMERCIAL

## 2020-10-02 DIAGNOSIS — Z12.11 SPECIAL SCREENING FOR MALIGNANT NEOPLASMS, COLON: ICD-10-CM

## 2020-10-02 LAB — SARS-COV-2 RNA RESP QL NAA+PROBE: NOT DETECTED

## 2020-10-02 PROCEDURE — U0003 INFECTIOUS AGENT DETECTION BY NUCLEIC ACID (DNA OR RNA); SEVERE ACUTE RESPIRATORY SYNDROME CORONAVIRUS 2 (SARS-COV-2) (CORONAVIRUS DISEASE [COVID-19]), AMPLIFIED PROBE TECHNIQUE, MAKING USE OF HIGH THROUGHPUT TECHNOLOGIES AS DESCRIBED BY CMS-2020-01-R: HCPCS

## 2020-10-05 ENCOUNTER — ANESTHESIA (OUTPATIENT)
Dept: ENDOSCOPY | Facility: HOSPITAL | Age: 75
End: 2020-10-05
Payer: COMMERCIAL

## 2020-10-05 ENCOUNTER — HOSPITAL ENCOUNTER (OUTPATIENT)
Facility: HOSPITAL | Age: 75
Discharge: HOME | End: 2020-10-05
Attending: COLON & RECTAL SURGERY | Admitting: COLON & RECTAL SURGERY
Payer: COMMERCIAL

## 2020-10-05 ENCOUNTER — ANESTHESIA EVENT (OUTPATIENT)
Dept: ENDOSCOPY | Facility: HOSPITAL | Age: 75
End: 2020-10-05
Payer: COMMERCIAL

## 2020-10-05 VITALS
SYSTOLIC BLOOD PRESSURE: 126 MMHG | TEMPERATURE: 98 F | OXYGEN SATURATION: 99 % | HEIGHT: 66 IN | RESPIRATION RATE: 16 BRPM | HEART RATE: 77 BPM | DIASTOLIC BLOOD PRESSURE: 69 MMHG | BODY MASS INDEX: 25.71 KG/M2 | WEIGHT: 160 LBS

## 2020-10-05 DIAGNOSIS — K62.89 PROCTITIS: ICD-10-CM

## 2020-10-05 PROCEDURE — 45380 COLONOSCOPY AND BIOPSY: CPT | Mod: ,,, | Performed by: COLON & RECTAL SURGERY

## 2020-10-05 PROCEDURE — E9220 PRA ENDO ANESTHESIA: HCPCS | Mod: ,,, | Performed by: NURSE ANESTHETIST, CERTIFIED REGISTERED

## 2020-10-05 PROCEDURE — 88305 TISSUE EXAM BY PATHOLOGIST: ICD-10-PCS | Mod: 26,,, | Performed by: PATHOLOGY

## 2020-10-05 PROCEDURE — 27201012 HC FORCEPS, HOT/COLD, DISP: Performed by: COLON & RECTAL SURGERY

## 2020-10-05 PROCEDURE — 25000003 PHARM REV CODE 250: Performed by: COLON & RECTAL SURGERY

## 2020-10-05 PROCEDURE — 88305 TISSUE EXAM BY PATHOLOGIST: CPT | Mod: 26,,, | Performed by: PATHOLOGY

## 2020-10-05 PROCEDURE — 45380 COLONOSCOPY AND BIOPSY: CPT | Performed by: COLON & RECTAL SURGERY

## 2020-10-05 PROCEDURE — 88305 TISSUE EXAM BY PATHOLOGIST: CPT | Performed by: PATHOLOGY

## 2020-10-05 PROCEDURE — 45380 PR COLONOSCOPY,BIOPSY: ICD-10-PCS | Mod: ,,, | Performed by: COLON & RECTAL SURGERY

## 2020-10-05 PROCEDURE — 37000009 HC ANESTHESIA EA ADD 15 MINS: Performed by: COLON & RECTAL SURGERY

## 2020-10-05 PROCEDURE — E9220 PRA ENDO ANESTHESIA: ICD-10-PCS | Mod: ,,, | Performed by: NURSE ANESTHETIST, CERTIFIED REGISTERED

## 2020-10-05 PROCEDURE — 63600175 PHARM REV CODE 636 W HCPCS: Performed by: NURSE ANESTHETIST, CERTIFIED REGISTERED

## 2020-10-05 PROCEDURE — 37000008 HC ANESTHESIA 1ST 15 MINUTES: Performed by: COLON & RECTAL SURGERY

## 2020-10-05 RX ORDER — PROPOFOL 10 MG/ML
INJECTION, EMULSION INTRAVENOUS CONTINUOUS PRN
Status: DISCONTINUED | OUTPATIENT
Start: 2020-10-05 | End: 2020-10-05

## 2020-10-05 RX ORDER — PROPOFOL 10 MG/ML
INJECTION, EMULSION INTRAVENOUS
Status: DISCONTINUED | OUTPATIENT
Start: 2020-10-05 | End: 2020-10-05

## 2020-10-05 RX ORDER — LIDOCAINE HCL/PF 100 MG/5ML
SYRINGE (ML) INTRAVENOUS
Status: DISCONTINUED | OUTPATIENT
Start: 2020-10-05 | End: 2020-10-05

## 2020-10-05 RX ORDER — SODIUM CHLORIDE 9 MG/ML
INJECTION, SOLUTION INTRAVENOUS CONTINUOUS
Status: DISCONTINUED | OUTPATIENT
Start: 2020-10-05 | End: 2020-10-05 | Stop reason: HOSPADM

## 2020-10-05 RX ADMIN — SODIUM CHLORIDE: 0.9 INJECTION, SOLUTION INTRAVENOUS at 11:10

## 2020-10-05 RX ADMIN — SODIUM CHLORIDE: 0.9 INJECTION, SOLUTION INTRAVENOUS at 10:10

## 2020-10-05 RX ADMIN — PROPOFOL 200 MCG/KG/MIN: 10 INJECTION, EMULSION INTRAVENOUS at 11:10

## 2020-10-05 RX ADMIN — PROPOFOL 30 MG: 10 INJECTION, EMULSION INTRAVENOUS at 11:10

## 2020-10-05 RX ADMIN — Medication 60 MG: at 11:10

## 2020-10-05 RX ADMIN — PROPOFOL 70 MG: 10 INJECTION, EMULSION INTRAVENOUS at 11:10

## 2020-10-05 NOTE — [employer] PATIENT INSTRUCTIONS
Discharge Summary/Instructions after an Endoscopic Procedure  Patient Name: Alejandro Wayne  Patient MRN: [id number]  Patient YOB: 1945  Monday, October 5, 2020  Joe Hall MD  RESTRICTIONS:  During your procedure today, you received medications for sedation.  These   medications may affect your judgment, balance and coordination.  Therefore,   for 24 hours, you have the following restrictions:   - DO NOT drive a car, operate machinery, make legal/financial decisions,   sign important papers or drink alcohol.    ACTIVITY:  Today: no heavy lifting, straining or running due to procedural   sedation/anesthesia.  The following day: return to full activity including work.  DIET:  Eat and drink normally unless instructed otherwise.     TREATMENT FOR COMMON SIDE EFFECTS:  - Mild abdominal pain, nausea, belching, bloating or excessive gas:  rest,   eat lightly and use a heating pad.  - Sore Throat: treat with throat lozenges and/or gargle with warm salt   water.  - Because air was used during the procedure, expelling large amounts of air   from your rectum or belching is normal.  - If a bowel prep was taken, you may not have a bowel movement for 1-3 days.    This is normal.  SYMPTOMS TO WATCH FOR AND REPORT TO YOUR PHYSICIAN:  1. Abdominal pain or bloating, other than gas cramps.  2. Chest pain.  3. Back pain.  4. Signs of infection such as: chills or fever occurring within 24 hours   after the procedure.  5. Rectal bleeding, which would show as bright red, maroon, or black stools.   (A tablespoon of blood from the rectum is not serious, especially if   hemorrhoids are present.)  6. Vomiting.  7. Weakness or dizziness.  GO DIRECTLY TO THE NEAREST EMERGENCY ROOM IF YOU HAVE ANY OF THE FOLLOWING:      Difficulty breathing              Chills and/or fever over 101 F   Persistent vomiting and/or vomiting blood   Severe abdominal pain   Severe chest pain   Black, tarry stools   Bleeding- more than one tablespoon   Any  other symptom or condition that you feel may need urgent attention  Your doctor recommends these additional instructions:  If any biopsies were taken, your doctors clinic will contact you in 1 to 2   weeks with any results.  - Discharge patient to home (ambulatory).   - Patient has a contact number available for emergencies.  The signs and   symptoms of potential delayed complications were discussed with the   patient.  Return to normal activities tomorrow.  Written discharge   instructions were provided to the patient.   - Resume previous diet.   - Continue present medications.   - Await pathology results.   - Repeat colonoscopy in 6 months for surveillance based on pathology   results.   - Return to GI office in 2 weeks.  For questions, problems or results please call your physician - Joe Hall MD at Work:  [phone].  [facility], EMERGENCY ROOM PHONE NUMBER: [phone]  IF A COMPLICATION OR EMERGENCY SITUATION ARISES AND YOU ARE UNABLE TO REACH   YOUR PHYSICIAN - GO DIRECTLY TO THE EMERGENCY ROOM.  Joe Hall MD  10/5/2020 12:09:26 PM  This report has been verified and signed electronically.  [employer]

## 2020-10-05 NOTE — ANESTHESIA PREPROCEDURE EVALUATION
10/05/2020  Alejandro Wayne is a 75 y.o., male.    Past Medical History:   Diagnosis Date    Basal cell carcinoma     BPH (benign prostatic hyperplasia)     Chronic nonallergic rhinitis 5/28/2020    Colon polyp     Hyperlipidemia     Hypertension     Liver cyst 1/11/2016    Prostate nodule     Squamous cell carcinoma     Thyroid nodule 5/28/2020         Pre-op Assessment    I have reviewed the Patient Summary Reports.       I have reviewed the Medications.     Review of Systems  Anesthesia Hx:  No problems with previous Anesthesia Denies Hx of Anesthetic complications  Neg history of prior surgery. Denies Family Hx of Anesthesia complications.   Denies Personal Hx of Anesthesia complications.   Social:  Former Smoker    Hematology/Oncology:  Hematology Normal   Oncology Normal     EENT/Dental:EENT/Dental Normal   Cardiovascular:   Exercise tolerance: good Hypertension hyperlipidemia ECG has been reviewed.    Pulmonary:  Pulmonary Normal    Renal/:  Renal/ Normal     Hepatic/GI:  Hepatic/GI Normal Bowel Prep.    Musculoskeletal:  Musculoskeletal Normal    Neurological:  Neurology Normal    Endocrine:  Endocrine Normal    Dermatological:  Skin Normal    Psych:  Psychiatric Normal           Physical Exam  General:  Well nourished    Airway/Jaw/Neck:  Airway Findings: Mouth Opening: Normal Tongue: Normal  General Airway Assessment: Adult  Mallampati: II  TM Distance: Normal, at least 6 cm  Jaw/Neck Findings:  Micrognathia: Negative Mandibular Fracture: Negative    Neck ROM: Normal ROM  Neck Findings: Normal     Dental:  Dental Findings: In tact   Chest/Lungs:  Chest/Lungs Findings: Clear to auscultation, Normal Respiratory Rate     Heart/Vascular:  Heart Findings: Rate: Normal  Rhythm: Regular Rhythm  Sounds: Normal  Heart murmur: negative Vascular Findings: Normal    Abdomen:  Abdomen Findings:   Normal, Soft, Nontender     Musculoskeletal:  Musculoskeletal Findings: Normal   Skin:  Skin Findings: Normal    Mental Status:  Mental Status Findings:  Cooperative, Alert and Oriented         Anesthesia Plan  Type of Anesthesia, risks & benefits discussed:  Anesthesia Type:  general  Patient's Preference:   Intra-op Monitoring Plan: standard ASA monitors  Intra-op Monitoring Plan Comments:   Post Op Pain Control Plan: IV/PO Opioids PRN  Post Op Pain Control Plan Comments:   Induction:   IV  Beta Blocker:  Patient is not currently on a Beta-Blocker (No further documentation required).       Informed Consent: Patient understands risks and agrees with Anesthesia plan.  Questions answered. Anesthesia consent signed with patient.  ASA Score: 2     Day of Surgery Review of History & Physical:    H&P update referred to the provider.         Ready For Surgery From Anesthesia Perspective.

## 2020-10-05 NOTE — TRANSFER OF CARE
"Anesthesia Transfer of Care Note    Patient: Alejandro Wayne    Procedure(s) Performed: Procedure(s) (LRB):  COLONOSCOPY (N/A)    Patient location: GI    Anesthesia Type: general    Transport from OR: Transported from OR on 6-10 L/min O2 by face mask with adequate spontaneous ventilation    Post pain: adequate analgesia    Post assessment: no apparent anesthetic complications and tolerated procedure well    Post vital signs: stable    Level of consciousness: awake    Nausea/Vomiting: no nausea/vomiting    Complications: none    Transfer of care protocol was followed      Last vitals:   Visit Vitals  BP (!) 140/74   Pulse 86   Temp 36.7 °C (98 °F)   Resp 15   Ht 5' 6" (1.676 m)   Wt 72.6 kg (160 lb)   SpO2 96%   BMI 25.82 kg/m²     "

## 2020-10-05 NOTE — DISCHARGE INSTRUCTIONS

## 2020-10-05 NOTE — H&P
COLONOSCOPY HISTORY AND PHYSICAL EXAM    Procedure : Colonoscopy      INDICATIONS: proctitis vs. diverticulitis    Family Hx of CRC: None    Last Colonoscopy:  1 year ago  Findings: Diverticulitis       Past Medical History:   Diagnosis Date    Basal cell carcinoma     BPH (benign prostatic hyperplasia)     Chronic nonallergic rhinitis 2020    Colon polyp     Hyperlipidemia     Hypertension     Liver cyst 2016    Prostate nodule     Squamous cell carcinoma     Thyroid nodule 2020     Sedation Problems: NO  Family History   Problem Relation Age of Onset    Cancer Mother         melanoma    Cancer Father         skin cancer    Celiac disease Neg Hx     Cirrhosis Neg Hx     Colon cancer Neg Hx     Colon polyps Neg Hx     Crohn's disease Neg Hx     Esophageal cancer Neg Hx     Inflammatory bowel disease Neg Hx     Irritable bowel syndrome Neg Hx     Liver cancer Neg Hx     Rectal cancer Neg Hx     Stomach cancer Neg Hx     Ulcerative colitis Neg Hx      Fam Hx of Sedation Problems: NO  Social History     Socioeconomic History    Marital status:      Spouse name: Not on file    Number of children: Not on file    Years of education: Not on file    Highest education level: Not on file   Occupational History    Not on file   Social Needs    Financial resource strain: Not on file    Food insecurity     Worry: Not on file     Inability: Not on file    Transportation needs     Medical: Not on file     Non-medical: Not on file   Tobacco Use    Smoking status: Former Smoker     Packs/day: 0.50     Years: 4.00     Pack years: 2.00     Types: Cigarettes     Quit date: 1966     Years since quittin.4    Smokeless tobacco: Never Used    Tobacco comment: as teenager   Substance and Sexual Activity    Alcohol use: Yes     Alcohol/week: 14.0 standard drinks     Types: 14 Glasses of wine per week     Comment: social    Drug use: No    Sexual activity: Yes     Partners:  "Female   Lifestyle    Physical activity     Days per week: Not on file     Minutes per session: Not on file    Stress: Not on file   Relationships    Social connections     Talks on phone: Not on file     Gets together: Not on file     Attends Shinto service: Not on file     Active member of club or organization: Not on file     Attends meetings of clubs or organizations: Not on file     Relationship status: Not on file   Other Topics Concern    Not on file   Social History Narrative    Not on file       Review of Systems - Negative except   Respiratory ROS: no dyspnea  Cardiovascular ROS: no exertional chest pain  Gastrointestinal ROS: NO abdominal discomfort,  NO rectal bleeding  Musculoskeletal ROS: no muscular pain  Neurological ROS: no recent stroke    Physical Exam:  BP (!) 140/74   Pulse 86   Temp 98 °F (36.7 °C)   Resp 15   Ht 5' 6" (1.676 m)   Wt 72.6 kg (160 lb)   SpO2 96%   BMI 25.82 kg/m²   General: no distress  Head: normocephalic  Mallampati Score   Neck: supple, symmetrical, trachea midline  Lungs:  clear to auscultation bilaterally and normal respiratory effort  Heart: regular rate and rhythm and no murmur  Abdomen: soft, non-tender non-distented; bowel sounds normal; no masses,  no organomegaly  Extremities: no cyanosis or edema, or clubbing      PLAN  COLONOSCOPY.    SedationPlan :MAC    The details of the procedure, the possible need for biopsy or polypectomy and the potential risks including bleeding, perforation, missed polyps were discussed in detail.      "

## 2020-10-05 NOTE — ANESTHESIA POSTPROCEDURE EVALUATION
Anesthesia Post Evaluation    Patient: Alejandro Wayne    Procedure(s) Performed: Procedure(s) (LRB):  COLONOSCOPY (N/A)    Final Anesthesia Type: general    Patient location during evaluation: PACU  Patient participation: Yes- Able to Participate  Level of consciousness: awake and alert  Post-procedure vital signs: reviewed and stable  Pain management: adequate  Airway patency: patent    PONV status at discharge: No PONV  Anesthetic complications: no      Cardiovascular status: blood pressure returned to baseline  Respiratory status: spontaneous ventilation and room air  Hydration status: euvolemic  Follow-up not needed.          Vitals Value Taken Time   /69 10/05/20 1227   Temp 36.7 °C (98 °F) 10/05/20 1209   Pulse 77 10/05/20 1227   Resp 16 10/05/20 1227   SpO2 99 % 10/05/20 1227         Event Time   Out of Recovery 12:39:48         Pain/Chetan Score: Chetan Score: 10 (10/5/2020 12:29 PM)

## 2020-10-07 ENCOUNTER — TELEPHONE (OUTPATIENT)
Dept: SURGERY | Facility: CLINIC | Age: 75
End: 2020-10-07

## 2020-10-07 LAB
COMMENT: NORMAL
FINAL PATHOLOGIC DIAGNOSIS: NORMAL
GROSS: NORMAL
Lab: NORMAL

## 2020-10-07 RX ORDER — SENNOSIDES 25 MG/1
1 TABLET, FILM COATED ORAL 4 TIMES DAILY
Qty: 30 G | Refills: 0 | Status: SHIPPED | OUTPATIENT
Start: 2020-10-07 | End: 2020-10-08 | Stop reason: SDUPTHER

## 2020-10-07 NOTE — TELEPHONE ENCOUNTER
Discussed path with pt.    Will refer to Dr Castro for med therapy of colitis  [facility] for anal discomfort.

## 2020-10-08 ENCOUNTER — TELEPHONE (OUTPATIENT)
Dept: GASTROENTEROLOGY | Facility: HOSPITAL | Age: 75
End: 2020-10-08

## 2020-10-08 DIAGNOSIS — K52.9 IBD (INFLAMMATORY BOWEL DISEASE): Primary | ICD-10-CM

## 2020-10-08 DIAGNOSIS — K64.4 EXTERNAL HEMORRHOID: Primary | ICD-10-CM

## 2020-10-08 RX ORDER — SENNOSIDES 25 MG/1
1 TABLET, FILM COATED ORAL 4 TIMES DAILY
Qty: 30 G | Refills: 0 | Status: SHIPPED | OUTPATIENT
Start: 2020-10-08 | End: 2020-10-21 | Stop reason: SDUPTHER

## 2020-10-15 ENCOUNTER — TELEPHONE (OUTPATIENT)
Dept: SURGERY | Facility: CLINIC | Age: 75
End: 2020-10-15

## 2020-10-15 NOTE — TELEPHONE ENCOUNTER
LOC [facility] w/pt for ov 10/21/2020.   TO HOLD LATANOPROST 12/12/18.

## 2020-10-19 ENCOUNTER — LAB VISIT (OUTPATIENT)
Dept: LAB | Facility: HOSPITAL | Age: 75
End: 2020-10-19
Attending: INTERNAL MEDICINE
Payer: COMMERCIAL

## 2020-10-19 DIAGNOSIS — K52.9 IBD (INFLAMMATORY BOWEL DISEASE): ICD-10-CM

## 2020-10-19 LAB
25(OH)D3+25(OH)D2 SERPL-MCNC: 36 NG/ML (ref 30–96)
ALBUMIN SERPL BCP-MCNC: 3.7 G/DL (ref 3.5–5.2)
ALP SERPL-CCNC: 68 U/L (ref 55–135)
ALT SERPL W/O P-5'-P-CCNC: 19 U/L (ref 10–44)
ANION GAP SERPL CALC-SCNC: 8 MMOL/L (ref 8–16)
AST SERPL-CCNC: 19 U/L (ref 10–40)
BASOPHILS # BLD AUTO: 0.09 K/UL (ref 0–0.2)
BASOPHILS NFR BLD: 1.5 % (ref 0–1.9)
BILIRUB SERPL-MCNC: 0.5 MG/DL (ref 0.1–1)
BUN SERPL-MCNC: 11 MG/DL (ref 8–23)
CALCIUM SERPL-MCNC: 8.7 MG/DL (ref 8.7–10.5)
CHLORIDE SERPL-SCNC: 103 MMOL/L (ref 95–110)
CO2 SERPL-SCNC: 27 MMOL/L (ref 23–29)
CREAT SERPL-MCNC: 0.7 MG/DL (ref 0.5–1.4)
CRP SERPL-MCNC: 16.2 MG/L (ref 0–8.2)
DIFFERENTIAL METHOD: ABNORMAL
EOSINOPHIL # BLD AUTO: 0.3 K/UL (ref 0–0.5)
EOSINOPHIL NFR BLD: 5.7 % (ref 0–8)
ERYTHROCYTE [DISTWIDTH] IN BLOOD BY AUTOMATED COUNT: 13.5 % (ref 11.5–14.5)
EST. GFR  (AFRICAN AMERICAN): >60 ML/MIN/1.73 M^2
EST. GFR  (NON AFRICAN AMERICAN): >60 ML/MIN/1.73 M^2
FERRITIN SERPL-MCNC: 137 NG/ML (ref 20–300)
GLUCOSE SERPL-MCNC: 109 MG/DL (ref 70–110)
HCT VFR BLD AUTO: 41.1 % (ref 40–54)
HGB BLD-MCNC: 12.9 G/DL (ref 14–18)
IMM GRANULOCYTES # BLD AUTO: 0.02 K/UL (ref 0–0.04)
IMM GRANULOCYTES NFR BLD AUTO: 0.3 % (ref 0–0.5)
IRON SERPL-MCNC: 64 UG/DL (ref 45–160)
LIPASE SERPL-CCNC: 42 U/L (ref 4–60)
LYMPHOCYTES # BLD AUTO: 0.9 K/UL (ref 1–4.8)
LYMPHOCYTES NFR BLD: 14.3 % (ref 18–48)
MAGNESIUM SERPL-MCNC: 2.1 MG/DL (ref 1.6–2.6)
MCH RBC QN AUTO: 27.9 PG (ref 27–31)
MCHC RBC AUTO-ENTMCNC: 31.4 G/DL (ref 32–36)
MCV RBC AUTO: 89 FL (ref 82–98)
MONOCYTES # BLD AUTO: 0.6 K/UL (ref 0.3–1)
MONOCYTES NFR BLD: 10.2 % (ref 4–15)
NEUTROPHILS # BLD AUTO: 4.1 K/UL (ref 1.8–7.7)
NEUTROPHILS NFR BLD: 68 % (ref 38–73)
NRBC BLD-RTO: 0 /100 WBC
PLATELET # BLD AUTO: 289 K/UL (ref 150–350)
PMV BLD AUTO: 9.5 FL (ref 9.2–12.9)
POTASSIUM SERPL-SCNC: 4.1 MMOL/L (ref 3.5–5.1)
PROT SERPL-MCNC: 7.2 G/DL (ref 6–8.4)
RBC # BLD AUTO: 4.62 M/UL (ref 4.6–6.2)
SARS-COV-2 IGG SERPLBLD QL IA.RAPID: NEGATIVE
SATURATED IRON: 21 % (ref 20–50)
SODIUM SERPL-SCNC: 138 MMOL/L (ref 136–145)
TOTAL IRON BINDING CAPACITY: 312 UG/DL (ref 250–450)
TRANSFERRIN SERPL-MCNC: 211 MG/DL (ref 200–375)
VIT B12 SERPL-MCNC: 642 PG/ML (ref 210–950)
WBC # BLD AUTO: 6 K/UL (ref 3.9–12.7)

## 2020-10-19 PROCEDURE — 83735 ASSAY OF MAGNESIUM: CPT

## 2020-10-19 PROCEDURE — 82306 VITAMIN D 25 HYDROXY: CPT

## 2020-10-19 PROCEDURE — 85025 COMPLETE CBC W/AUTO DIFF WBC: CPT

## 2020-10-19 PROCEDURE — 86769 SARS-COV-2 COVID-19 ANTIBODY: CPT

## 2020-10-19 PROCEDURE — 82728 ASSAY OF FERRITIN: CPT

## 2020-10-19 PROCEDURE — 36415 COLL VENOUS BLD VENIPUNCTURE: CPT

## 2020-10-19 PROCEDURE — 86140 C-REACTIVE PROTEIN: CPT

## 2020-10-19 PROCEDURE — 83540 ASSAY OF IRON: CPT

## 2020-10-19 PROCEDURE — 86682 HELMINTH ANTIBODY: CPT

## 2020-10-19 PROCEDURE — 83690 ASSAY OF LIPASE: CPT

## 2020-10-19 PROCEDURE — 80053 COMPREHEN METABOLIC PANEL: CPT

## 2020-10-19 PROCEDURE — 86706 HEP B SURFACE ANTIBODY: CPT

## 2020-10-19 PROCEDURE — 86790 VIRUS ANTIBODY NOS: CPT

## 2020-10-19 PROCEDURE — 82607 VITAMIN B-12: CPT

## 2020-10-19 PROCEDURE — 86753 PROTOZOA ANTIBODY NOS: CPT

## 2020-10-20 LAB — HEPATITIS A ANTIBODY, IGG: NEGATIVE

## 2020-10-21 ENCOUNTER — OFFICE VISIT (OUTPATIENT)
Dept: SURGERY | Facility: CLINIC | Age: 75
End: 2020-10-21
Payer: COMMERCIAL

## 2020-10-21 ENCOUNTER — PATIENT MESSAGE (OUTPATIENT)
Dept: GASTROENTEROLOGY | Facility: CLINIC | Age: 75
End: 2020-10-21

## 2020-10-21 VITALS
HEART RATE: 78 BPM | SYSTOLIC BLOOD PRESSURE: 120 MMHG | HEIGHT: 66 IN | DIASTOLIC BLOOD PRESSURE: 72 MMHG | BODY MASS INDEX: 26.08 KG/M2 | WEIGHT: 162.31 LBS

## 2020-10-21 DIAGNOSIS — K64.4 EXTERNAL HEMORRHOID: ICD-10-CM

## 2020-10-21 DIAGNOSIS — K51.918 ACUTE ULCERATIVE COLITIS WITH OTHER COMPLICATION: Primary | ICD-10-CM

## 2020-10-21 DIAGNOSIS — K52.9 COLITIS: Primary | ICD-10-CM

## 2020-10-21 LAB
HBV SURFACE AB SER QL IA: NEGATIVE
HBV SURFACE AB SERPL IA-ACNC: <3 MIU/ML
STRONGYLOIDES ANTIBODY IGG: NEGATIVE

## 2020-10-21 PROCEDURE — 1126F AMNT PAIN NOTED NONE PRSNT: CPT | Mod: S$GLB,,, | Performed by: COLON & RECTAL SURGERY

## 2020-10-21 PROCEDURE — 99213 PR OFFICE/OUTPT VISIT, EST, LEVL III, 20-29 MIN: ICD-10-PCS | Mod: S$GLB,,, | Performed by: COLON & RECTAL SURGERY

## 2020-10-21 PROCEDURE — 1159F MED LIST DOCD IN RCRD: CPT | Mod: S$GLB,,, | Performed by: COLON & RECTAL SURGERY

## 2020-10-21 PROCEDURE — 3078F DIAST BP <80 MM HG: CPT | Mod: CPTII,S$GLB,, | Performed by: COLON & RECTAL SURGERY

## 2020-10-21 PROCEDURE — 3074F PR MOST RECENT SYSTOLIC BLOOD PRESSURE < 130 MM HG: ICD-10-PCS | Mod: CPTII,S$GLB,, | Performed by: COLON & RECTAL SURGERY

## 2020-10-21 PROCEDURE — 99213 OFFICE O/P EST LOW 20 MIN: CPT | Mod: S$GLB,,, | Performed by: COLON & RECTAL SURGERY

## 2020-10-21 PROCEDURE — 1159F PR MEDICATION LIST DOCUMENTED IN MEDICAL RECORD: ICD-10-PCS | Mod: S$GLB,,, | Performed by: COLON & RECTAL SURGERY

## 2020-10-21 PROCEDURE — 99999 PR PBB SHADOW E&M-EST. PATIENT-LVL III: CPT | Mod: PBBFAC,,, | Performed by: COLON & RECTAL SURGERY

## 2020-10-21 PROCEDURE — 1101F PT FALLS ASSESS-DOCD LE1/YR: CPT | Mod: CPTII,S$GLB,, | Performed by: COLON & RECTAL SURGERY

## 2020-10-21 PROCEDURE — 3074F SYST BP LT 130 MM HG: CPT | Mod: CPTII,S$GLB,, | Performed by: COLON & RECTAL SURGERY

## 2020-10-21 PROCEDURE — 1126F PR PAIN SEVERITY QUANTIFIED, NO PAIN PRESENT: ICD-10-PCS | Mod: S$GLB,,, | Performed by: COLON & RECTAL SURGERY

## 2020-10-21 PROCEDURE — 99999 PR PBB SHADOW E&M-EST. PATIENT-LVL III: ICD-10-PCS | Mod: PBBFAC,,, | Performed by: COLON & RECTAL SURGERY

## 2020-10-21 PROCEDURE — 3078F PR MOST RECENT DIASTOLIC BLOOD PRESSURE < 80 MM HG: ICD-10-PCS | Mod: CPTII,S$GLB,, | Performed by: COLON & RECTAL SURGERY

## 2020-10-21 PROCEDURE — 1101F PR PT FALLS ASSESS DOC 0-1 FALLS W/OUT INJ PAST YR: ICD-10-PCS | Mod: CPTII,S$GLB,, | Performed by: COLON & RECTAL SURGERY

## 2020-10-21 RX ORDER — SENNOSIDES 25 MG/1
1 TABLET, FILM COATED ORAL 4 TIMES DAILY
Qty: 30 G | Refills: 0 | Status: SHIPPED | OUTPATIENT
Start: 2020-10-21 | End: 2020-11-10

## 2020-10-21 NOTE — PROGRESS NOTES
HPI:  Alejandro Wayne is a 75 y.o. male with history of loose stools and anal discomfort.  He underwent colonoscopy which revealed mucosal inflammation involving the rectum, left colon in a continuous fashion.  Biopsies were obtained    .1.  Terminal ileum (biopsy):   - Benign small intestinal mucosa, no histologic abnormality   - No active enteritis; no dysplasia or malignancy   2.  Right colon (biopsy):   - Chronic and superficial acute colitis (no acute cryptitis or cryptal   abscesses, see comment)   - No dysplasia or malignancy   3.  Transverse colon (biopsy):   - Chronic inactive colitis (see comment)   - No active colitis; no dysplasia or malignancy   4.  Left colon (biopsy):   - Chronic colitis, with focal mild activity (one focus of acute cryptitis,   see comment)   - No dysplasia or malignancy   5.  Sigmoid colon (biopsy):   - Chronic colitis, with focally moderate to severe activity (acute cryptitis,   crypt abscesses and focal necrosis, see comment)   - No dysplasia or malignancy   6.  Rectum (biopsy):   - Chronic colitis, with focally mild to moderate activity (crypt abscesses   and acute cryptitis, see comment)   - One intramucosal granuloma   - No dysplasia or malignancy   7.  Distal rectum (biopsy):   - Chronic colitis, with focally moderate to severe activity (acute cryptitis,   crypt abscesses and focal necrosis with granulation tissue, see comment)   - No dysplasia or malignancy     He reports that since the colonoscopy he has felt much better.  He denies any abdominal pain.  His bowel movements are much more formed.  He states that he often feels the urge to go after eating a meal.  He is having 2-3 bowel movements a day.  He denies any blood.  Occasionally they are loose but generally much more formed.  He states that his hemorrhoidal symptoms are related to residue and he has swelling of his underwear even though he has wiped clean.    He is scheduled to see Dr. Castro next week for colitis         Past Medical History:   Diagnosis Date    Basal cell carcinoma     BPH (benign prostatic hyperplasia)     Chronic nonallergic rhinitis 5/28/2020    Colon polyp     Hyperlipidemia     Hypertension     Liver cyst 1/11/2016    Prostate nodule     Squamous cell carcinoma     Thyroid nodule 5/28/2020        Past Surgical History:   Procedure Laterality Date    APPENDECTOMY      COLONOSCOPY N/A 6/22/2016    Procedure: COLONOSCOPY;  Surgeon: Marco Mathews MD;  Location: [facility] ENDO (4TH FLR);  Service: Endoscopy;  Laterality: N/A;    COLONOSCOPY N/A 10/21/2019    Procedure: COLONOSCOPY;  Surgeon: Marco Mathews MD;  Location: [facility] ENDO (4TH FLR);  Service: Endoscopy;  Laterality: N/A;  First case of the day with me next available     COLONOSCOPY N/A 10/5/2020    Procedure: COLONOSCOPY;  Surgeon: LIZABETH Hall MD;  Location: [facility] ENDO (4TH FLR);  Service: Endoscopy;  Laterality: N/A;    COLONOSCOPY W/ POLYPECTOMY      ENDOSCOPIC ULTRASOUND OF UPPER GASTROINTESTINAL TRACT N/A 6/24/2019    Procedure: ULTRASOUND, UPPER GI TRACT, ENDOSCOPIC;  Surgeon: Jeremy Saleem MD;  Location: [facility] (2ND FLR);  Service: Endoscopy;  Laterality: N/A;  For evaluation of low fecal elastase and loose stools and history of nonspecific finding of the EUS of the pancreas    ESOPHAGOGASTRODUODENOSCOPY      ESOPHAGOGASTRODUODENOSCOPY N/A 6/24/2019    Procedure: EGD (ESOPHAGOGASTRODUODENOSCOPY);  Surgeon: Jeremy Saleem MD;  Location: [facility] (2ND FLR);  Service: Endoscopy;  Laterality: N/A;  For evaluation of iron deficiency anemia    ESOPHAGOGASTRODUODENOSCOPY N/A 10/21/2019    Procedure: EGD (ESOPHAGOGASTRODUODENOSCOPY);  Surgeon: Marco Mathews MD;  Location: [facility] ENDO (4TH FLR);  Service: Endoscopy;  Laterality: N/A;  First case of the day with me next available     HERNIA REPAIR      KNEE ARTHROSCOPY W/ DEBRIDEMENT      TONSILLECTOMY, ADENOIDECTOMY      UPPER ENDOSCOPIC ULTRASOUND W/ FNA       VASECTOMY         Review of patient's allergies indicates:  No Known Allergies    Family History   Problem Relation Age of Onset    Cancer Mother         melanoma    Cancer Father         skin cancer    Celiac disease Neg Hx     Cirrhosis Neg Hx     Colon cancer Neg Hx     Colon polyps Neg Hx     Crohn's disease Neg Hx     Esophageal cancer Neg Hx     Inflammatory bowel disease Neg Hx     Irritable bowel syndrome Neg Hx     Liver cancer Neg Hx     Rectal cancer Neg Hx     Stomach cancer Neg Hx     Ulcerative colitis Neg Hx        Social History     Socioeconomic History    Marital status:      Spouse name: Not on file    Number of children: Not on file    Years of education: Not on file    Highest education level: Not on file   Occupational History    Not on file   Social Needs    Financial resource strain: Not on file    Food insecurity     Worry: Not on file     Inability: Not on file    Transportation needs     Medical: Not on file     Non-medical: Not on file   Tobacco Use    Smoking status: Former Smoker     Packs/day: 0.50     Years: 4.00     Pack years: 2.00     Types: Cigarettes     Quit date: 1966     Years since quittin.4    Smokeless tobacco: Never Used    Tobacco comment: as teenager   Substance and Sexual Activity    Alcohol use: Yes     Alcohol/week: 14.0 standard drinks     Types: 14 Glasses of wine per week     Comment: social    Drug use: No    Sexual activity: Yes     Partners: Female   Lifestyle    Physical activity     Days per week: Not on file     Minutes per session: Not on file    Stress: Not on file   Relationships    Social connections     Talks on phone: Not on file     Gets together: Not on file     Attends Buddhism service: Not on file     Active member of club or organization: Not on file     Attends meetings of clubs or organizations: Not on file     Relationship status: Not on file   Other Topics Concern    Not on file   Social History  "Narrative    Not on file       ROS:  GENERAL: No fever, chills, fatigability or weight loss.  Integument: No rashes, redness, icterus  CHEST: Denies HEADLEY, cyanosis, wheezing, cough and sputum production.  CARDIOVASCULAR: Denies chest pain, PND, orthopnea or reduced exercise tolerance.  GI: Denies abd pain, dysphagia, nausea, vomiting, no hematemesis   : Denies burning on urination, no hematuria, no bacteriuria  MSK: No deformities, swelling, joint pain swelling  Neurologic: No HAs, seizures, weakness, paresthesias, gait problems    PE:  General appearance well  /72 (BP Location: Left arm, Patient Position: Sitting, BP Method: Large (Automatic))   Pulse 78   Ht 5' 6" (1.676 m)   Wt 73.6 kg (162 lb 4.8 oz)   BMI 26.20 kg/m²   Sclera/ Skin anicteric  AT NC EOMI  Neck supple trachea midline   Chest symmetric, nl excursion, no retractions, breathing comfortably  EXT - no CCE  Neuro:  Mood/ affect nl, alert and oriented x 3, moves all ext's, gait nl        Assessment:  Pan colitis, mild symptomatology at this point without any bleeding or diarrhea.  Anorectal discomfort secondary to fecal residue and complete evacuations secondary to soft stools    Plan:  Continue high-fiber diet Metamucil  Hemorrhoid cream p.r.n.  Follow-up with gastroenterology for specific colitis treatment.  No indication for surgical intervention regarding colitis or hemorrhoidal symptoms      "

## 2020-10-22 LAB — E HISTOLYT AB SER IA-ACNC: NEGATIVE

## 2020-10-27 ENCOUNTER — PATIENT MESSAGE (OUTPATIENT)
Dept: GASTROENTEROLOGY | Facility: CLINIC | Age: 75
End: 2020-10-27

## 2020-10-27 PROCEDURE — 99358 PR PROLONGED SERV,NO CONTACT,1ST HR: ICD-10-PCS | Mod: S$GLB,,, | Performed by: INTERNAL MEDICINE

## 2020-10-27 PROCEDURE — 99358 PROLONG SERVICE W/O CONTACT: CPT | Mod: S$GLB,,, | Performed by: INTERNAL MEDICINE

## 2020-11-10 ENCOUNTER — OFFICE VISIT (OUTPATIENT)
Dept: GASTROENTEROLOGY | Facility: CLINIC | Age: 75
End: 2020-11-10
Payer: COMMERCIAL

## 2020-11-10 ENCOUNTER — PATIENT MESSAGE (OUTPATIENT)
Dept: GASTROENTEROLOGY | Facility: CLINIC | Age: 75
End: 2020-11-10

## 2020-11-10 DIAGNOSIS — K50.10 CROHN'S DISEASE OF COLON WITHOUT COMPLICATION: Primary | ICD-10-CM

## 2020-11-10 PROCEDURE — 1159F PR MEDICATION LIST DOCUMENTED IN MEDICAL RECORD: ICD-10-PCS | Mod: S$GLB,,, | Performed by: INTERNAL MEDICINE

## 2020-11-10 PROCEDURE — 1101F PT FALLS ASSESS-DOCD LE1/YR: CPT | Mod: CPTII,S$GLB,, | Performed by: INTERNAL MEDICINE

## 2020-11-10 PROCEDURE — 1101F PR PT FALLS ASSESS DOC 0-1 FALLS W/OUT INJ PAST YR: ICD-10-PCS | Mod: CPTII,S$GLB,, | Performed by: INTERNAL MEDICINE

## 2020-11-10 PROCEDURE — 99214 PR OFFICE/OUTPT VISIT, EST, LEVL IV, 30-39 MIN: ICD-10-PCS | Mod: S$GLB,,, | Performed by: INTERNAL MEDICINE

## 2020-11-10 PROCEDURE — 1159F MED LIST DOCD IN RCRD: CPT | Mod: S$GLB,,, | Performed by: INTERNAL MEDICINE

## 2020-11-10 PROCEDURE — 99214 OFFICE O/P EST MOD 30 MIN: CPT | Mod: S$GLB,,, | Performed by: INTERNAL MEDICINE

## 2020-11-10 RX ORDER — MESALAMINE 1.2 G/1
2.4 TABLET, DELAYED RELEASE ORAL
Qty: 60 TABLET | Refills: 11 | Status: SHIPPED | OUTPATIENT
Start: 2020-11-10 | End: 2020-11-18

## 2020-11-10 RX ORDER — MESALAMINE 1000 MG/1
1000 SUPPOSITORY RECTAL 2 TIMES DAILY
Qty: 60 SUPPOSITORY | Refills: 1 | Status: SHIPPED | OUTPATIENT
Start: 2020-11-10 | End: 2020-12-10

## 2020-11-10 NOTE — PATIENT INSTRUCTIONS
1. Submit stool sample  2. Start Lialda 2 pills daily  3. Start Canasa suppositories twice a day for 2 weeks then go to once a day  4. Follow up in 6 weeks.  5. Get Shingles vaccine

## 2020-11-10 NOTE — LETTER
November 10, 2020      Marco Mathews MD  [address]  [facility] [address]           [facility]  [address]  [facility] [address]  Phone: [phone]  Fax: [phone]          Patient: Alejandro Wayne   MR Number: [id number]   YOB: 1945   Date of Visit: 11/10/2020       Dear Dr. Marco Mathews:    Thank you for referring Alejandro Wayne to me for evaluation. Attached you will find relevant portions of my assessment and plan of care.    If you have questions, please do not hesitate to call me. I look forward to following Alejandro Wayne along with you.    Sincerely,    Aidan Castro MD    Enclosure  CC:  No Recipients    If you would like to receive this communication electronically, please contact [email or web address] or [phone] to request more information on [employer] access.    For providers and/or their staff who would like to refer a patient to [facility], please contact us through our one-stop-shop provider referral line, [facility], at [phone].    If you feel you have received this communication in error or would no longer like to receive these types of communications, please e-mail [email or web address]

## 2020-11-10 NOTE — PROGRESS NOTES
I spent 30 minutes on 10/27/20 reviewing Alejandro Wayne medical records prior to clinic visit on 11/10/20.       Answers for HPI/ROS submitted by the patient on 11/10/2020   abdominal pain: Yes

## 2020-11-10 NOTE — PROGRESS NOTES
[facility]          Inflammatory Bowel Disease New Patient Consultation Note         TODAY'S VISIT DATE:  11/10/2020    Reason for Consult:    Chief Complaint   Patient presents with    Crohn's Disease       PCP: Edward Sheppard      Referring MD:   Dr. Marco Mathews    History of Present Illness:  Alejandro Wayne who is a 75 y.o. male is being seen today at the [facility] on 11/10/2020 for inflammatory bowel disease- Crohn's disease.  He is here today for evaluation of newly diagnosed Crohn's disease of the colon.  His history is noted below.  He reports that since his colonoscopy he is actually doing quite a bit better.  He has been having anywhere between 3 and 4 bowel movements per day.  They are typically formed but he always has a lot of urgency.  The bowel movements are typically stimulated by eating.  He has not seen any blood in the stools.  He does continue to have issues with hemorrhoids and has had some issues with fecal seepage.  He continues to take Creon for previous history of pancreatic insufficiency.    IBD History:  He has had several years of intermittent gastrointestinal problems.  He was initially seen by Dr. Mathews in November of 2015 with complaints of chronic diarrhea.  At that time he was found to have a low fecal elastase level consistent with pancreatic insufficiency.  Endoscopic ultrasound was performed and found changes consistent with mild chronic pancreatitis.  He has a history of chronic alcohol use.  A CT scan was also performed around that time which showed changes consistent with diverticulitis.  In June of 2016 he underwent a screening colonoscopy which revealed a normal terminal ileum, multiple diverticula in the sigmoid, descending, transverse, and ascending colon.  There was also some moderately erythematous mucosa in the mid sigmoid and distal sigmoid colon and biopsies were taken which did not show  any significant features.  In October 2018 he had a repeat CT scan done because of abdominal pain and he was again found to have multiple diverticula as well as some luminal narrowing and wall thickening and changes consistent with sigmoid diverticulitis.  In October 2019 he underwent an upper endoscopy and colonoscopy because of iron deficiency anemia and diarrhea.  The upper endoscopy revealed a small hiatal hernia but was otherwise normal.  Colonoscopy revealed a normal terminal ileum, multiple diverticula, moderately erythematous mucosa in the sigmoid colon with purulent exudate consistent with acute diverticulitis.  A CT scan again revealed changes with diverticulitis.  In November 2019 he was seen in follow-up by Colorectal surgery and a partial colectomy was recommended because of recurrent diverticulitis.  This was delayed due to family medical issues.  In May of this year he presented to Colorectal surgery Clinic again with complaints of loose stools.  A repeat CT scan showed finding similar to all of his previous CT scans.  He underwent colonoscopy on October 5, 2020 because of ongoing diarrhea issues and was found to have inflamed ulcerated mucosa in the anus and rectum, sigmoid colon, distal descending colon.  The more proximal colon appeared normal.  There was also a mild anal stricture.  Biopsies from this colonoscopy revealed a normal terminal ileum.  Biopsies of the right colon revealed chronic and superficial acute colitis.  Left colon biopsies revealed chronic colitis as well as at least 1 rectal biopsy revealing an intramucosal granuloma.    IBD Details:  Dx Date: 10/5/2020  Disease type/distribution:  Crohn's disease/colonic involvement (mostly rectum and sigmoid but with some mild descending inflammation endoscopically and more proximal colon involved histologically)  Current Treatment:  None  Start Date:  Response:   Optimized:   Adverse reactions:   Prior surgeries:  None  CRP Elevation:  Y calprotectin:  Pending  Disease Complications:  None  Extraintestinal manifestations:  None  Prior treatments:   Steroids:  None  5ASA:  None  IMM:  None  TNF Inh:  None   Anti-Integrin:  None   IL 12/23:  None  ADDISON Inh:  None    Previous Clinical Trials:  None    Last Colonoscopy:  October 5, 2020-moderate inflammation of the rectum with less severe inflammation of the sigmoid and descending colon.  Remainder of the colon was normal appearing other diverticular disease.  Biopsies with pan colitis.  Ileum was normal and biopsies of the ileum were normal.    Other Endoscopies:  Multiple previous colonoscopy reports reviewed with findings of inflammation in the sigmoid colon most consistent with segmental colitis associated with diverticular disease verses diverticulitis    Imaging:   MRE:  None   CT:  May 2020-no evidence of small-bowel inflammation   Other:  None    Pertinent Labs:  Lab Results   Component Value Date    SEDRATE 16 (H) 10/08/2018    CRP 16.2 (H) 10/19/2020     Lab Results   Component Value Date    TTGIGA 9 05/04/2020     05/04/2020     Lab Results   Component Value Date    TSH 1.965 05/04/2020     Lab Results   Component Value Date    UNTYYAZX29OZ 36 10/19/2020    CNTXRUTY73 642 10/19/2020     Lab Results   Component Value Date    HEPBSAG Negative 11/23/2015    HEPCAB Negative 02/07/2019     No results found for: DDW73FQJV  Lab Results   Component Value Date    NIL 0.050 10/19/2020    MITOGENNIL 3.840 10/19/2020     No results found for: TPTMINTERP, TPMTRESULT  Lab Results   Component Value Date    STOOLCULTURE  05/15/2020     No Salmonella,Shigella,Vibrio,Campylobacter,Yersinia isolated.    TWGRRFAFBW1G Negative 05/15/2020    TRXARYDJQL2J Negative 05/15/2020    CDIFFICILEAN Negative 05/15/2020    CDIFFTOX Negative 05/15/2020     No results found for: CALPROTECTIN    Therapeutic Drug Monitoring Labs:  No results found for: PROMETH  No results found for: ANSADAINIT, INFLIXIMAB,  INFLIXINTERP    Vaccinations:  No results found for: HEPBSAB  Lab Results   Component Value Date    HEPAIGG Negative 10/19/2020     No results found for: VARICELLAZOS, VARICELLAINT  Immunization History   Administered Date(s) Administered    Hepatitis A, Adult 03/01/2016    Influenza - High Dose - PF (65 years and older) 10/15/2013, 09/10/2014, 10/24/2015, 12/13/2016, 10/02/2017, 09/11/2018    Influenza - Quadrivalent 09/18/2019    Influenza - Quadrivalent - High Dose - PF (65 years and older) 08/25/2020    Influenza Whole 09/01/2015    Pneumococcal Conjugate - 13 Valent 03/01/2016, 12/13/2016    Pneumococcal Polysaccharide - 23 Valent 09/10/2014    Tdap 03/01/2016    Zoster 11/10/2014         Review of Systems  Review of Systems   Constitutional: Negative for chills, fever and weight loss.   HENT: Negative for sore throat.    Eyes: Negative for pain, discharge and redness.   Respiratory: Negative for cough, shortness of breath and wheezing.    Cardiovascular: Negative for chest pain and leg swelling.   Gastrointestinal: Positive for abdominal pain. Negative for blood in stool, constipation, diarrhea, heartburn, melena, nausea and vomiting.   Genitourinary: Negative for dysuria and frequency.   Musculoskeletal: Negative for back pain, joint pain and myalgias.   Skin: Negative for rash.   Neurological: Negative for focal weakness and seizures.   Endo/Heme/Allergies: Does not bruise/bleed easily.   Psychiatric/Behavioral: Negative for depression. The patient is not nervous/anxious.        Medical History:   Past Medical History:   Diagnosis Date    Basal cell carcinoma     BPH (benign prostatic hyperplasia)     Chronic nonallergic rhinitis 5/28/2020    Colon polyp     Hyperlipidemia     Hypertension     Liver cyst 1/11/2016    Prostate nodule     Squamous cell carcinoma     Thyroid nodule 5/28/2020       Surgical History:  Past Surgical History:   Procedure Laterality Date    APPENDECTOMY       COLONOSCOPY N/A 6/22/2016    Procedure: COLONOSCOPY;  Surgeon: Marco Mathews MD;  Location: [facility] ENDO (4TH FLR);  Service: Endoscopy;  Laterality: N/A;    COLONOSCOPY N/A 10/21/2019    Procedure: COLONOSCOPY;  Surgeon: Marco Mathews MD;  Location: [facility] ENDO (4TH FLR);  Service: Endoscopy;  Laterality: N/A;  First case of the day with me next available     COLONOSCOPY N/A 10/5/2020    Procedure: COLONOSCOPY;  Surgeon: LIZABETH Hall MD;  Location: [facility] ENDO (4TH FLR);  Service: Endoscopy;  Laterality: N/A;    COLONOSCOPY W/ POLYPECTOMY      ENDOSCOPIC ULTRASOUND OF UPPER GASTROINTESTINAL TRACT N/A 6/24/2019    Procedure: ULTRASOUND, UPPER GI TRACT, ENDOSCOPIC;  Surgeon: Jeremy Saleem MD;  Location: [facility] (2ND FLR);  Service: Endoscopy;  Laterality: N/A;  For evaluation of low fecal elastase and loose stools and history of nonspecific finding of the EUS of the pancreas    ESOPHAGOGASTRODUODENOSCOPY      ESOPHAGOGASTRODUODENOSCOPY N/A 6/24/2019    Procedure: EGD (ESOPHAGOGASTRODUODENOSCOPY);  Surgeon: Jeremy Saleem MD;  Location: [facility] (2ND FLR);  Service: Endoscopy;  Laterality: N/A;  For evaluation of iron deficiency anemia    ESOPHAGOGASTRODUODENOSCOPY N/A 10/21/2019    Procedure: EGD (ESOPHAGOGASTRODUODENOSCOPY);  Surgeon: Marco Mathews MD;  Location: [facility] (4TH FLR);  Service: Endoscopy;  Laterality: N/A;  First case of the day with me next available     HERNIA REPAIR      KNEE ARTHROSCOPY W/ DEBRIDEMENT      TONSILLECTOMY, ADENOIDECTOMY      UPPER ENDOSCOPIC ULTRASOUND W/ FNA      VASECTOMY         Family History:   Family History   Problem Relation Age of Onset    Cancer Mother         melanoma    Cancer Father         skin cancer    Celiac disease Neg Hx     Cirrhosis Neg Hx     Colon cancer Neg Hx     Colon polyps Neg Hx     Crohn's disease Neg Hx     Esophageal cancer Neg Hx     Inflammatory bowel disease Neg Hx     Irritable bowel syndrome Neg Hx     Liver  cancer Neg Hx     Rectal cancer Neg Hx     Stomach cancer Neg Hx     Ulcerative colitis Neg Hx        Social History:   Social History     Tobacco Use    Smoking status: Former Smoker     Packs/day: 0.50     Years: 4.00     Pack years: 2.00     Types: Cigarettes     Quit date: 1966     Years since quittin.5    Smokeless tobacco: Never Used    Tobacco comment: as teenager   Substance Use Topics    Alcohol use: Yes     Alcohol/week: 14.0 standard drinks     Types: 14 Glasses of wine per week     Comment: social    Drug use: No       Allergies: Reviewed    Home Medications:   Medication List with Changes/Refills   New Medications    MESALAMINE (CANASA) 1000 MG SUPP    Place 1 suppository (1,000 mg total) rectally 2 (two) times daily.    MESALAMINE (LIALDA) 1.2 GRAM TBEC    Take 2 tablets (2.4 g total) by mouth daily with breakfast. BRAND ONLY   Current Medications    AMLODIPINE (NORVASC) 5 MG TABLET    TAKE 1 TABLET BY MOUTH EVERY DAY    ASCORBIC ACID (VITAMIN C) 1000 MG TABLET    Take 1,000 mg by mouth once daily.    ASPIRIN (ECOTRIN) 81 MG EC TABLET    Take 81 mg by mouth once daily.    CETIRIZINE (ZYRTEC) 10 MG TABLET    Take 10 mg by mouth once daily.    CREON 36,000-114,000- 180,000 UNIT CPDR    TAKE 3 CAPSULES BY MOUTH THREE TIMES DAILY WITH MEALS. TAKE 1 CAPSULE BY MOUTH WITH EVERY SNACK. DO NOT EXCEED 12 CAPSULE DAILY AS DIRECTED    DIPHENOXYLATE-ATROPINE 2.5-0.025 MG (LOMOTIL) 2.5-0.025 MG PER TABLET    1-2 pills qid prn diarrhea    FERROUS SULFATE (FEOSOL) 325 MG (65 MG IRON) TAB TABLET    Take 1 tablet (325 mg total) by mouth every 12 (twelve) hours.    FERROUS SULFATE (FEOSOL) 325 MG (65 MG IRON) TAB TABLET    Take 1 tablet (325 mg total) by mouth every 12 (twelve) hours.    FLUOROURACIL (EFUDEX) 5 % CREAM        LATANOPROST (XALATAN) 0.005 % OPHTHALMIC SOLUTION    Place 1 drop into the right eye every evening.    MULTIVITAMIN (MULTIVITAMIN) PER TABLET    Take 1 tablet by mouth once daily.     PANTOPRAZOLE (PROTONIX) 40 MG TABLET    Take 1 tablet (40 mg total) by mouth before breakfast. Take one pill every morning 45 minutes before breakfast in the morning.    ROSUVASTATIN (CRESTOR) 20 MG TABLET    TAKE 1 TABLET(20 MG) BY MOUTH EVERY DAY    TADALAFIL (CIALIS) 20 MG TAB    TAKE 1 TABLET(20 MG) BY MOUTH DAILY AS NEEDED   Discontinued Medications    AMOXICILLIN-CLAVULANATE 875-125MG (AUGMENTIN) 875-125 MG PER TABLET    Take 1 tablet by mouth every 12 (twelve) hours.    ESZOPICLONE (LUNESTA) 2 MG TAB    TAKE 1 TABLET BY MOUTH EVERY NIGHT AT BEDTIME AS NEEDED SLEEP    FLUTICASONE PROPIONATE (FLONASE) 50 MCG/ACTUATION NASAL SPRAY    SHAKE LIQUID AND USE 2 SPRAYS(100 MCG) IN EACH NOSTRIL EVERY DAY    LIDOCAINE 5 % CREA    Apply 1 Film topically 4 (four) times daily.    MOXIFLOXACIN (AVELOX) 400 MG TABLET    Take 1 tablet (400 mg total) by mouth once daily.    ROSUVASTATIN (CRESTOR) 10 MG TABLET    TAKE 1 TABLET(10 MG) BY MOUTH EVERY DAY    TRIAMCINOLONE ACETONIDE 0.1% (KENALOG) 0.1 % OINTMENT    APPLY A SMALL AMOUNT TO THE AFFECTED AREA QD UTD    VITAMIN E 1000 UNIT CAPSULE    Take 1,000 Units by mouth once daily.       Physical Exam:  Vital Signs:  There were no vitals taken for this visit.  There is no height or weight on file to calculate BMI.    Physical Exam   Constitutional: He is oriented to person, place, and time. He appears well-developed and well-nourished. No distress.   HENT:   Head: Normocephalic and atraumatic.   Eyes: Pupils are equal, round, and reactive to light. No scleral icterus.   Neck: No thyromegaly present.   Cardiovascular: Normal rate and regular rhythm. Exam reveals no friction rub.   No murmur heard.  Pulmonary/Chest: Effort normal and breath sounds normal. He has no wheezes. He has no rales.   Abdominal: Soft. Bowel sounds are normal. He exhibits no distension and no mass. There is no abdominal tenderness. There is no rebound and no guarding.   Musculoskeletal:         General:  No edema.   Lymphadenopathy:     He has no cervical adenopathy.   Neurological: He is alert and oriented to person, place, and time.   Skin: No rash noted.   Psychiatric: He has a normal mood and affect. His behavior is normal. Judgment and thought content normal.   Nursing note and vitals reviewed.      Labs: reviewed and pertinent noted above    Assessment/Plan:  Alejandro Wayne is a 75 y.o. male with Crohn's colitis. The following issues were addresssed:    1. Crohn's disease of colon without complication      1.  Crohn's disease:  Based on his most recent endoscopic appearance and biopsies if it appears that he has Crohn's colitis.  There does not appear to be any significant small intestinal involvement.  And a his previous endoscopic evaluation has not been entirely consistent with Crohn's colitis and was more consistent with either diverticulitis or segmental colitis associated with diverticular disease.  We discussed the etiology of Crohn's disease and potential complications including fistula formation and stricture formation.  At this time, based on his endoscopic appearance and clinical features a he has mild Crohn's colitis.  I recommend that we check a fecal calprotectin to assess his baseline.  Will plan to start Lialda 2.4 g daily as well as Canasa suppositories twice daily for 2 weeks and then decreasing to once daily.  He will continue once daily dosing until he follows up in the office.      Ex smoker:  - recommended to continue smoking cessation  - discussed in detail that Crohn's disease can worsen with smoking and may make patient more resistant to treatment    # IBD specific health maintenance:  Colon cancer surveillance:  Up-to-date    Annual:  - Eye exam:  Discuss in the future  - Skin exam (if on IMM/TNF):  Discuss in the future  - reminded pt to use sunblock/hats/sunprotective clothing  - PAP (if immunosuppressed):  Not applicable    DEXA:  Discuss in the future-recommended due to  age    Vitamin D:  Normal    Vaccines:    Influenza:  Up-to-date   Pneumovax:     PCV13:  Up-to-date    PSV23:  Up-to-date   HAV:  Discuss vaccination in the future   HBV:  Discuss vaccination in the future   Tdap:  Up-to-date   MMR:  Up-to-date   VZV:  History of chickenpox as a child   HZV:  Ordered today   HPV:  Not applicable   Meningococcus:  Not applicable    Follow up: Follow up in about 6 weeks (around 12/22/2020).    Thank you again for sending Alejandro Wayne to see Dr. Yury Castro today at the [facility]. Please don't hesitate to contact Dr. Castro if there are any questions regarding this evaluation, or if you have any other patients with inflammatory bowel disease for whom you would like a consultation. You can reach Dr. Castro at [phone] or by email at [email or web address]    Aidan Castro MD  Department of Gastroenterology  Inflammatory Bowel Disease

## 2020-11-12 ENCOUNTER — PATIENT MESSAGE (OUTPATIENT)
Dept: GASTROENTEROLOGY | Facility: CLINIC | Age: 75
End: 2020-11-12

## 2020-11-13 NOTE — TELEPHONE ENCOUNTER
JUAN PABLO muro John was denied     [facility] MEDICAL APPEALS [facility] [address] [address],[place],[address] Phone:[phone] Fax:[phone];   Important - Please read the below note on eAppeals: Please reference the denial letter for information on the rights for an appeal, rationale for the denial, and how to submit an appeal including if any information is needed to support the appeal. Note about urgent situations - Generally, an urgent situation is one which, in the opinion of the provider, the health of the patient may be in serious jeopardy or may experience pain that cannot be adequately controlled while waiting for a decision on the appeal.;

## 2020-11-17 ENCOUNTER — PATIENT MESSAGE (OUTPATIENT)
Dept: GASTROENTEROLOGY | Facility: CLINIC | Age: 75
End: 2020-11-17

## 2020-11-18 ENCOUNTER — PATIENT MESSAGE (OUTPATIENT)
Dept: GASTROENTEROLOGY | Facility: CLINIC | Age: 75
End: 2020-11-18

## 2020-11-18 RX ORDER — MESALAMINE 400 MG/1
800 CAPSULE, DELAYED RELEASE ORAL 3 TIMES DAILY
Qty: 180 CAPSULE | Refills: 5 | Status: SHIPPED | OUTPATIENT
Start: 2020-11-18 | End: 2020-12-24

## 2020-11-18 NOTE — TELEPHONE ENCOUNTER
Call and spoke to pt   Advise pt to get a copy of rx drug formulary or a list of medication coved under his plan   pt expressed understanding

## 2020-11-21 ENCOUNTER — PATIENT MESSAGE (OUTPATIENT)
Dept: GASTROENTEROLOGY | Facility: CLINIC | Age: 75
End: 2020-11-21

## 2020-11-23 RX ORDER — MESALAMINE 1.2 G/1
2.4 TABLET, DELAYED RELEASE ORAL
Qty: 60 TABLET | Refills: 11 | Status: SHIPPED | OUTPATIENT
Start: 2020-11-23 | End: 2020-11-24

## 2020-11-24 ENCOUNTER — PATIENT MESSAGE (OUTPATIENT)
Dept: GASTROENTEROLOGY | Facility: CLINIC | Age: 75
End: 2020-11-24

## 2020-11-24 DIAGNOSIS — K86.81 EXOCRINE PANCREATIC INSUFFICIENCY: ICD-10-CM

## 2020-11-24 RX ORDER — PANTOPRAZOLE SODIUM 40 MG/1
40 TABLET, DELAYED RELEASE ORAL
Qty: 90 TABLET | Refills: 3 | OUTPATIENT
Start: 2020-11-24 | End: 2021-11-24

## 2020-11-24 RX ORDER — PANTOPRAZOLE SODIUM 20 MG/1
20 TABLET, DELAYED RELEASE ORAL
Qty: 90 TABLET | Refills: 3 | Status: SHIPPED | OUTPATIENT
Start: 2020-11-24 | End: 2020-12-15 | Stop reason: SDUPTHER

## 2020-11-24 RX ORDER — MESALAMINE 1.2 G/1
2.4 TABLET, DELAYED RELEASE ORAL
Qty: 180 TABLET | Refills: 3 | Status: SHIPPED | OUTPATIENT
Start: 2020-11-24 | End: 2020-12-24 | Stop reason: SDUPTHER

## 2020-11-25 NOTE — TELEPHONE ENCOUNTER
Called and spoke to pt.  Pt is scheduled for labs on 11/17 @ 8:30 am.  Pt aware of fasting 10 hours prior to labs.  Pt accepted appt.     No chest pain or complaints. Trying to find a ride home. Remains in sinus rhythm. Gait steady when up walking around.         Kimberlee Eddy RN  11/25/20 [id number]

## 2020-12-15 ENCOUNTER — PATIENT MESSAGE (OUTPATIENT)
Dept: GASTROENTEROLOGY | Facility: CLINIC | Age: 75
End: 2020-12-15

## 2020-12-15 DIAGNOSIS — K21.9 GASTROESOPHAGEAL REFLUX DISEASE, UNSPECIFIED WHETHER ESOPHAGITIS PRESENT: Primary | ICD-10-CM

## 2020-12-15 RX ORDER — PANTOPRAZOLE SODIUM 20 MG/1
20 TABLET, DELAYED RELEASE ORAL
Qty: 90 TABLET | Refills: 3 | Status: SHIPPED | OUTPATIENT
Start: 2020-12-15 | End: 2021-04-30

## 2020-12-17 ENCOUNTER — TELEPHONE (OUTPATIENT)
Dept: GASTROENTEROLOGY | Facility: CLINIC | Age: 75
End: 2020-12-17

## 2020-12-17 NOTE — TELEPHONE ENCOUNTER
MA spoke with [facility] pharmacy. The pharmacist is aware patient was prescribed Pantoprazole 20 mg.     She verbalized understanding.

## 2020-12-23 ENCOUNTER — LAB VISIT (OUTPATIENT)
Dept: INTERNAL MEDICINE | Facility: CLINIC | Age: 75
End: 2020-12-23
Payer: COMMERCIAL

## 2020-12-23 DIAGNOSIS — Z20.828 EXPOSURE TO SARS-ASSOCIATED CORONAVIRUS: ICD-10-CM

## 2020-12-23 PROCEDURE — U0003 INFECTIOUS AGENT DETECTION BY NUCLEIC ACID (DNA OR RNA); SEVERE ACUTE RESPIRATORY SYNDROME CORONAVIRUS 2 (SARS-COV-2) (CORONAVIRUS DISEASE [COVID-19]), AMPLIFIED PROBE TECHNIQUE, MAKING USE OF HIGH THROUGHPUT TECHNOLOGIES AS DESCRIBED BY CMS-2020-01-R: HCPCS

## 2020-12-24 ENCOUNTER — OFFICE VISIT (OUTPATIENT)
Dept: GASTROENTEROLOGY | Facility: CLINIC | Age: 75
End: 2020-12-24
Payer: COMMERCIAL

## 2020-12-24 VITALS
DIASTOLIC BLOOD PRESSURE: 79 MMHG | BODY MASS INDEX: 26.96 KG/M2 | SYSTOLIC BLOOD PRESSURE: 135 MMHG | TEMPERATURE: 98 F | WEIGHT: 167.75 LBS | OXYGEN SATURATION: 95 % | HEART RATE: 95 BPM | HEIGHT: 66 IN

## 2020-12-24 DIAGNOSIS — K86.81 EXOCRINE PANCREATIC INSUFFICIENCY: ICD-10-CM

## 2020-12-24 DIAGNOSIS — K50.10 CROHN'S DISEASE OF COLON WITHOUT COMPLICATION: Primary | ICD-10-CM

## 2020-12-24 LAB — SARS-COV-2 RNA RESP QL NAA+PROBE: NOT DETECTED

## 2020-12-24 PROCEDURE — 1126F PR PAIN SEVERITY QUANTIFIED, NO PAIN PRESENT: ICD-10-PCS | Mod: S$GLB,,, | Performed by: INTERNAL MEDICINE

## 2020-12-24 PROCEDURE — 1159F MED LIST DOCD IN RCRD: CPT | Mod: S$GLB,,, | Performed by: INTERNAL MEDICINE

## 2020-12-24 PROCEDURE — 1101F PT FALLS ASSESS-DOCD LE1/YR: CPT | Mod: CPTII,S$GLB,, | Performed by: INTERNAL MEDICINE

## 2020-12-24 PROCEDURE — 3075F SYST BP GE 130 - 139MM HG: CPT | Mod: CPTII,S$GLB,, | Performed by: INTERNAL MEDICINE

## 2020-12-24 PROCEDURE — 3288F FALL RISK ASSESSMENT DOCD: CPT | Mod: CPTII,S$GLB,, | Performed by: INTERNAL MEDICINE

## 2020-12-24 PROCEDURE — 3078F DIAST BP <80 MM HG: CPT | Mod: CPTII,S$GLB,, | Performed by: INTERNAL MEDICINE

## 2020-12-24 PROCEDURE — 1101F PR PT FALLS ASSESS DOC 0-1 FALLS W/OUT INJ PAST YR: ICD-10-PCS | Mod: CPTII,S$GLB,, | Performed by: INTERNAL MEDICINE

## 2020-12-24 PROCEDURE — 3078F PR MOST RECENT DIASTOLIC BLOOD PRESSURE < 80 MM HG: ICD-10-PCS | Mod: CPTII,S$GLB,, | Performed by: INTERNAL MEDICINE

## 2020-12-24 PROCEDURE — 3288F PR FALLS RISK ASSESSMENT DOCUMENTED: ICD-10-PCS | Mod: CPTII,S$GLB,, | Performed by: INTERNAL MEDICINE

## 2020-12-24 PROCEDURE — 3075F PR MOST RECENT SYSTOLIC BLOOD PRESS GE 130-139MM HG: ICD-10-PCS | Mod: CPTII,S$GLB,, | Performed by: INTERNAL MEDICINE

## 2020-12-24 PROCEDURE — 1159F PR MEDICATION LIST DOCUMENTED IN MEDICAL RECORD: ICD-10-PCS | Mod: S$GLB,,, | Performed by: INTERNAL MEDICINE

## 2020-12-24 PROCEDURE — 99214 OFFICE O/P EST MOD 30 MIN: CPT | Mod: S$GLB,,, | Performed by: INTERNAL MEDICINE

## 2020-12-24 PROCEDURE — 99214 PR OFFICE/OUTPT VISIT, EST, LEVL IV, 30-39 MIN: ICD-10-PCS | Mod: S$GLB,,, | Performed by: INTERNAL MEDICINE

## 2020-12-24 PROCEDURE — 1126F AMNT PAIN NOTED NONE PRSNT: CPT | Mod: S$GLB,,, | Performed by: INTERNAL MEDICINE

## 2020-12-24 RX ORDER — MESALAMINE 1.2 G/1
2.4 TABLET, DELAYED RELEASE ORAL
Qty: 180 TABLET | Refills: 3 | Status: SHIPPED | OUTPATIENT
Start: 2020-12-24 | End: 2021-03-19

## 2020-12-24 NOTE — PROGRESS NOTES
[facility]          Inflammatory Bowel Disease Follow Up Note         TODAY'S VISIT DATE:  12/24/2020    Reason for Consult:    Chief Complaint   Patient presents with    Crohn's Disease       PCP: Edward Sheppard      Referring MD:   No ref. provider found    History of Present Illness:  Alejandro Wayne who is a 75 y.o. male is being seen today at the [facility] on 12/24/2020 for inflammatory bowel disease- Crohn's disease.  He is here today for a follow-up visit.  He started Lialda in November.  He never received the Canasa suppositories.  He has only been taking 1 of the Lialda tablets daily.  He has noted a significant improvement.  He feels like this has improved the consistency of his stools significantly.  He does continue to have about 3-4 bowel movements per day but he has noticed a marked improvement.  He continues to have issues with urgency and loose stools after abdominal cramping following just about every meal.  This has been a chronic issue for him.    IBD History:  He has had several years of intermittent gastrointestinal problems.  He was initially seen by Dr. Mathews in November of 2015 with complaints of chronic diarrhea.  At that time he was found to have a low fecal elastase level consistent with pancreatic insufficiency.  Endoscopic ultrasound was performed and found changes consistent with mild chronic pancreatitis.  He has a history of chronic alcohol use.  A CT scan was also performed around that time which showed changes consistent with diverticulitis.  In June of 2016 he underwent a screening colonoscopy which revealed a normal terminal ileum, multiple diverticula in the sigmoid, descending, transverse, and ascending colon.  There was also some moderately erythematous mucosa in the mid sigmoid and distal sigmoid colon and biopsies were taken which did not show any significant features.  In October 2018 he had a repeat  CT scan done because of abdominal pain and he was again found to have multiple diverticula as well as some luminal narrowing and wall thickening and changes consistent with sigmoid diverticulitis.  In October 2019 he underwent an upper endoscopy and colonoscopy because of iron deficiency anemia and diarrhea.  The upper endoscopy revealed a small hiatal hernia but was otherwise normal.  Colonoscopy revealed a normal terminal ileum, multiple diverticula, moderately erythematous mucosa in the sigmoid colon with purulent exudate consistent with acute diverticulitis.  A CT scan again revealed changes with diverticulitis.  In November 2019 he was seen in follow-up by Colorectal surgery and a partial colectomy was recommended because of recurrent diverticulitis.  This was delayed due to family medical issues.  In May of this year he presented to Colorectal surgery Clinic again with complaints of loose stools.  A repeat CT scan showed finding similar to all of his previous CT scans.  He underwent colonoscopy on October 5, 2020 because of ongoing diarrhea issues and was found to have inflamed ulcerated mucosa in the anus and rectum, sigmoid colon, distal descending colon.  The more proximal colon appeared normal.  There was also a mild anal stricture.  Biopsies from this colonoscopy revealed a normal terminal ileum.  Biopsies of the right colon revealed chronic and superficial acute colitis.  Left colon biopsies revealed chronic colitis as well as at least 1 rectal biopsy revealing an intramucosal granuloma.  He was started on Lialda in November 2020.    IBD Details:  Dx Date: 10/5/2020  Disease type/distribution:  Crohn's disease/colonic involvement (mostly rectum and sigmoid but with some mild descending inflammation endoscopically and more proximal colon involved histologically)  Current Treatment:  Lialda  Start Date:  November 2020  Response:  Unclear  Optimized:  No Adverse reactions:  None  Prior surgeries:  None  CRP  Elevation: Y calprotectin:  Markedly elevated  Disease Complications:  None  Extraintestinal manifestations:  None  Prior treatments:   Steroids:  None  5ASA:  Currently on Lialda  IMM:  None  TNF Inh:  None   Anti-Integrin:  None   IL 12/23:  None  ADDISON Inh:  None    Previous Clinical Trials:  None    Last Colonoscopy:  October 5, 2020-moderate inflammation of the rectum with less severe inflammation of the sigmoid and descending colon.  Remainder of the colon was normal appearing other diverticular disease.  Biopsies with pan colitis.  Ileum was normal and biopsies of the ileum were normal.    Other Endoscopies:  Multiple previous colonoscopy reports reviewed with findings of inflammation in the sigmoid colon most consistent with segmental colitis associated with diverticular disease verses diverticulitis    Imaging:   MRE:  None   CT:  May 2020-no evidence of small-bowel inflammation   Other:  None    Pertinent Labs:  Lab Results   Component Value Date    SEDRATE 16 (H) 10/08/2018    CRP 16.2 (H) 10/19/2020     Lab Results   Component Value Date    TTGIGA 9 05/04/2020     05/04/2020     Lab Results   Component Value Date    TSH 1.965 05/04/2020     Lab Results   Component Value Date    NTOWGQBO15CW 36 10/19/2020    WSRQSZIQ87 642 10/19/2020     Lab Results   Component Value Date    HEPBSAG Negative 11/23/2015    HEPCAB Negative 02/07/2019     No results found for: OSQ71MAQB  Lab Results   Component Value Date    NIL 0.050 10/19/2020    MITOGENNIL 3.840 10/19/2020     No results found for: TPTMINTERP, TPMTRESULT  Lab Results   Component Value Date    STOOLCULTURE  05/15/2020     No Salmonella,Shigella,Vibrio,Campylobacter,Yersinia isolated.    IHQVQOGZXP3T Negative 05/15/2020    XECQYVGSDN0W Negative 05/15/2020    CDIFFICILEAN Negative 05/15/2020    CDIFFTOX Negative 05/15/2020     Lab Results   Component Value Date    CALPROTECTIN 688.3 (H) 11/10/2020       Therapeutic Drug Monitoring Labs:  No results  found for: PROMETH  No results found for: ANSADAINIT, INFLIXIMAB, INFLIXINTERP    Vaccinations:  No results found for: HEPBSAB  Lab Results   Component Value Date    HEPAIGG Negative 10/19/2020     No results found for: VARICELLAZOS, VARICELLAINT  Immunization History   Administered Date(s) Administered    Hepatitis A, Adult 03/01/2016    Influenza - High Dose - PF (65 years and older) 10/15/2013, 09/10/2014, 10/24/2015, 12/13/2016, 10/02/2017, 09/11/2018    Influenza - Quadrivalent 09/18/2019    Influenza - Quadrivalent - High Dose - PF (65 years and older) 08/25/2020    Influenza Whole 09/01/2015    Pneumococcal Conjugate - 13 Valent 03/01/2016, 12/13/2016    Pneumococcal Polysaccharide - 23 Valent 09/10/2014    Tdap 03/01/2016    Zoster 11/10/2014         Review of Systems  Review of Systems   Constitutional: Negative for chills, fever and weight loss.   HENT: Negative for sore throat.    Eyes: Negative for pain, discharge and redness.   Respiratory: Negative for cough, shortness of breath and wheezing.    Cardiovascular: Negative for chest pain and leg swelling.   Gastrointestinal: Negative for abdominal pain, blood in stool, constipation, diarrhea, heartburn, melena, nausea and vomiting.   Genitourinary: Negative for dysuria and frequency.   Musculoskeletal: Negative for back pain, joint pain and myalgias.   Skin: Negative for rash.   Neurological: Negative for focal weakness and seizures.   Endo/Heme/Allergies: Does not bruise/bleed easily.   Psychiatric/Behavioral: Negative for depression. The patient is not nervous/anxious.        Medical History:   Past Medical History:   Diagnosis Date    Basal cell carcinoma     BPH (benign prostatic hyperplasia)     Chronic nonallergic rhinitis 5/28/2020    Colon polyp     Hyperlipidemia     Hypertension     Liver cyst 1/11/2016    Prostate nodule     Squamous cell carcinoma     Thyroid nodule 5/28/2020       Surgical History:  Past Surgical History:    Procedure Laterality Date    APPENDECTOMY      COLONOSCOPY N/A 6/22/2016    Procedure: COLONOSCOPY;  Surgeon: Marco Mathews MD;  Location: [facility] (4TH FLR);  Service: Endoscopy;  Laterality: N/A;    COLONOSCOPY N/A 10/21/2019    Procedure: COLONOSCOPY;  Surgeon: Marco Mathews MD;  Location: [facility] (4TH FLR);  Service: Endoscopy;  Laterality: N/A;  First case of the day with me next available     COLONOSCOPY N/A 10/5/2020    Procedure: COLONOSCOPY;  Surgeon: LIZABETH Hall MD;  Location: [facility] (4TH FLR);  Service: Endoscopy;  Laterality: N/A;    COLONOSCOPY W/ POLYPECTOMY      ENDOSCOPIC ULTRASOUND OF UPPER GASTROINTESTINAL TRACT N/A 6/24/2019    Procedure: ULTRASOUND, UPPER GI TRACT, ENDOSCOPIC;  Surgeon: Jeremy Saleem MD;  Location: [facility] (2ND FLR);  Service: Endoscopy;  Laterality: N/A;  For evaluation of low fecal elastase and loose stools and history of nonspecific finding of the EUS of the pancreas    ESOPHAGOGASTRODUODENOSCOPY      ESOPHAGOGASTRODUODENOSCOPY N/A 6/24/2019    Procedure: EGD (ESOPHAGOGASTRODUODENOSCOPY);  Surgeon: Jeremy Saleem MD;  Location: [facility] (2ND FLR);  Service: Endoscopy;  Laterality: N/A;  For evaluation of iron deficiency anemia    ESOPHAGOGASTRODUODENOSCOPY N/A 10/21/2019    Procedure: EGD (ESOPHAGOGASTRODUODENOSCOPY);  Surgeon: Marco Mathews MD;  Location: [facility] (4TH FLR);  Service: Endoscopy;  Laterality: N/A;  First case of the day with me next available     HERNIA REPAIR      KNEE ARTHROSCOPY W/ DEBRIDEMENT      TONSILLECTOMY, ADENOIDECTOMY      UPPER ENDOSCOPIC ULTRASOUND W/ FNA      VASECTOMY         Family History:   Family History   Problem Relation Age of Onset    Cancer Mother         melanoma    Cancer Father         skin cancer    Celiac disease Neg Hx     Cirrhosis Neg Hx     Colon cancer Neg Hx     Colon polyps Neg Hx     Crohn's disease Neg Hx     Esophageal cancer Neg Hx     Inflammatory bowel disease Neg  Hx     Irritable bowel syndrome Neg Hx     Liver cancer Neg Hx     Rectal cancer Neg Hx     Stomach cancer Neg Hx     Ulcerative colitis Neg Hx        Social History:   Social History     Tobacco Use    Smoking status: Former Smoker     Packs/day: 0.50     Years: 4.00     Pack years: 2.00     Types: Cigarettes     Quit date: 1966     Years since quittin.6    Smokeless tobacco: Never Used    Tobacco comment: as teenager   Substance Use Topics    Alcohol use: Yes     Alcohol/week: 14.0 standard drinks     Types: 14 Glasses of wine per week     Comment: social    Drug use: No       Allergies: Reviewed    Home Medications:   Medication List with Changes/Refills   Current Medications    AMLODIPINE (NORVASC) 5 MG TABLET    TAKE 1 TABLET BY MOUTH EVERY DAY    ASCORBIC ACID (VITAMIN C) 1000 MG TABLET    Take 1,000 mg by mouth once daily.    ASPIRIN (ECOTRIN) 81 MG EC TABLET    Take 81 mg by mouth once daily.    CETIRIZINE (ZYRTEC) 10 MG TABLET    Take 10 mg by mouth once daily.    CREON 36,000-114,000- 180,000 UNIT CPDR    TAKE 3 CAPSULES BY MOUTH THREE TIMES DAILY WITH MEALS. TAKE 1 CAPSULE BY MOUTH WITH EVERY SNACK. DO NOT EXCEED 12 CAPSULE DAILY AS DIRECTED    DIPHENOXYLATE-ATROPINE 2.5-0.025 MG (LOMOTIL) 2.5-0.025 MG PER TABLET    1-2 pills qid prn diarrhea    ESZOPICLONE (LUNESTA) 2 MG TAB    Take 1 tablet (2 mg total) by mouth every evening.    FERROUS SULFATE (FEOSOL) 325 MG (65 MG IRON) TAB TABLET    Take 1 tablet (325 mg total) by mouth every 12 (twelve) hours.    FLUOROURACIL (EFUDEX) 5 % CREAM        MULTIVITAMIN (MULTIVITAMIN) PER TABLET    Take 1 tablet by mouth once daily.    PANTOPRAZOLE (PROTONIX) 20 MG TABLET    Take 1 tablet (20 mg total) by mouth before breakfast.    ROSUVASTATIN (CRESTOR) 20 MG TABLET    TAKE 1 TABLET(20 MG) BY MOUTH EVERY DAY    TADALAFIL (CIALIS) 20 MG TAB    TAKE 1 TABLET(20 MG) BY MOUTH DAILY AS NEEDED   Changed and/or Refilled Medications    Modified Medication  "Previous Medication    MESALAMINE (LIALDA) 1.2 GRAM TBEC mesalamine (LIALDA) 1.2 gram TbEC       Take 2 tablets (2.4 g total) by mouth daily with breakfast.    Take 2 tablets (2.4 g total) by mouth daily with breakfast.   Discontinued Medications    FERROUS SULFATE (FEOSOL) 325 MG (65 MG IRON) TAB TABLET    Take 1 tablet (325 mg total) by mouth every 12 (twelve) hours.    LATANOPROST (XALATAN) 0.005 % OPHTHALMIC SOLUTION    Place 1 drop into the right eye every evening.    MESALAMINE (DELZICOL) 400 MG CDTI CDTI CAPSULE    Take 2 capsules (800 mg total) by mouth 3 (three) times daily.       Physical Exam:  Vital Signs:  /79 (BP Location: Left arm, Patient Position: Sitting)   Pulse 95   Temp 97.9 °F (36.6 °C)   Ht 5' 6" (1.676 m)   Wt 76.1 kg (167 lb 12.3 oz)   SpO2 95%   BMI 27.08 kg/m²   Body mass index is 27.08 kg/m².    Physical Exam   Constitutional: He is oriented to person, place, and time. He appears well-developed and well-nourished. No distress.   HENT:   Head: Normocephalic and atraumatic.   Eyes: Pupils are equal, round, and reactive to light. No scleral icterus.   Neck: No thyromegaly present.   Cardiovascular: Normal rate and regular rhythm. Exam reveals no friction rub.   No murmur heard.  Pulmonary/Chest: Effort normal and breath sounds normal. He has no wheezes. He has no rales.   Abdominal: Soft. Bowel sounds are normal. He exhibits no distension and no mass. There is no abdominal tenderness. There is no rebound and no guarding.   Musculoskeletal:         General: No edema.   Lymphadenopathy:     He has no cervical adenopathy.   Neurological: He is alert and oriented to person, place, and time.   Skin: No rash noted.   Psychiatric: He has a normal mood and affect. His behavior is normal. Judgment and thought content normal.   Nursing note and vitals reviewed.      Labs: reviewed and pertinent noted above    Assessment/Plan:  Alejandro Wayne is a 75 y.o. male with Crohn's colitis. The " following issues were addresssed:    1. Crohn's disease of colon without complication    2. Exocrine pancreatic insufficiency      1.  Crohn's disease:  He has noticed some improvement since starting Lialda.  Unfortunately he is only taking 1 tablet daily.  He is supposed to be taking 2 tablets daily.  He will increase the dose today.  He never received this suppositories be given his improvement with Lialda I think we can forego this at the present time.  We will plan to reassess in about 3 months.  At that time we will check a CRP as well as a fecal calprotectin to monitor for improvement.  It is not entirely clear whether his urgency and diarrhea issues with meals are related to his pancreatic insufficiency, Crohn's disease, or possibly IBS related.  Once we addressed the Crohn's disease and have objective evidence of good control we will consider further evaluation including rechecking for fecal fat to re-evaluate the the adequacy of his pancreatic enzyme replacement.  If that is unrevealing then we will consider treatment for IBS.    2.  Pancreatic insufficiency:  Continue Creon.      Ex smoker:  - recommended to continue smoking cessation  - discussed in detail that Crohn's disease can worsen with smoking and may make patient more resistant to treatment    # IBD specific health maintenance:  Colon cancer surveillance:  Up-to-date    Annual:  - Eye exam:  Discuss in the future  - Skin exam (if on IMM/TNF):  Discuss in the future  - reminded pt to use sunblock/hats/sunprotective clothing  - PAP (if immunosuppressed):  Not applicable    DEXA:  Discuss in the future-recommended due to age    Vitamin D:  Normal    Vaccines:    Influenza:  Up-to-date   Pneumovax:     PCV13:  Up-to-date    PSV23:  Up-to-date   HAV:  Discuss vaccination in the future   HBV:  Discuss vaccination in the future   Tdap:  Up-to-date   MMR:  Up-to-date   VZV:  History of chickenpox as a child-check serology with next labs   HZV:  Ordered at  last visit   HPV:  Not applicable   Meningococcus:  Not applicable    Follow up: Follow up in about 3 months (around 3/24/2021).    Thank you again for sending Alejandro Wayne to see Dr. Yury Castro today at the [facility]. Please don't hesitate to contact Dr. Castro if there are any questions regarding this evaluation, or if you have any other patients with inflammatory bowel disease for whom you would like a consultation. You can reach Dr. Castro at [phone] or by email at [email or web address]    Aidan Castro MD  Department of Gastroenterology  Inflammatory Bowel Disease

## 2020-12-24 NOTE — PATIENT INSTRUCTIONS
1. Do labs/stool test in 3 months  2. Follow up 2 weeks after   3. Continue mesalamine 2 pills daily

## 2021-01-09 ENCOUNTER — IMMUNIZATION (OUTPATIENT)
Dept: INTERNAL MEDICINE | Facility: CLINIC | Age: 76
End: 2021-01-09
Payer: COMMERCIAL

## 2021-01-09 DIAGNOSIS — Z23 NEED FOR VACCINATION: ICD-10-CM

## 2021-01-09 PROCEDURE — 91300 COVID-19, MRNA, LNP-S, PF, 30 MCG/0.3 ML DOSE VACCINE: CPT | Mod: PBBFAC | Performed by: INTERNAL MEDICINE

## 2021-01-11 ENCOUNTER — LAB VISIT (OUTPATIENT)
Dept: INTERNAL MEDICINE | Facility: CLINIC | Age: 76
End: 2021-01-11
Payer: COMMERCIAL

## 2021-01-11 DIAGNOSIS — Z20.828 EXPOSURE TO SARS-ASSOCIATED CORONAVIRUS: ICD-10-CM

## 2021-01-11 PROCEDURE — U0003 INFECTIOUS AGENT DETECTION BY NUCLEIC ACID (DNA OR RNA); SEVERE ACUTE RESPIRATORY SYNDROME CORONAVIRUS 2 (SARS-COV-2) (CORONAVIRUS DISEASE [COVID-19]), AMPLIFIED PROBE TECHNIQUE, MAKING USE OF HIGH THROUGHPUT TECHNOLOGIES AS DESCRIBED BY CMS-2020-01-R: HCPCS

## 2021-01-13 LAB — SARS-COV-2 RNA RESP QL NAA+PROBE: NOT DETECTED

## 2021-01-21 ENCOUNTER — TELEPHONE (OUTPATIENT)
Dept: DERMATOLOGY | Facility: CLINIC | Age: 76
End: 2021-01-21

## 2021-01-30 ENCOUNTER — IMMUNIZATION (OUTPATIENT)
Dept: INTERNAL MEDICINE | Facility: CLINIC | Age: 76
End: 2021-01-30
Payer: COMMERCIAL

## 2021-01-30 DIAGNOSIS — Z23 NEED FOR VACCINATION: Primary | ICD-10-CM

## 2021-01-30 PROCEDURE — 91300 COVID-19, MRNA, LNP-S, PF, 30 MCG/0.3 ML DOSE VACCINE: CPT | Mod: PBBFAC | Performed by: INTERNAL MEDICINE

## 2021-01-30 PROCEDURE — 0002A COVID-19, MRNA, LNP-S, PF, 30 MCG/0.3 ML DOSE VACCINE: CPT | Mod: PBBFAC | Performed by: INTERNAL MEDICINE

## 2021-02-19 ENCOUNTER — TELEPHONE (OUTPATIENT)
Dept: DERMATOLOGY | Facility: CLINIC | Age: 76
End: 2021-02-19

## 2021-02-23 ENCOUNTER — PROCEDURE VISIT (OUTPATIENT)
Dept: DERMATOLOGY | Facility: CLINIC | Age: 76
End: 2021-02-23
Payer: COMMERCIAL

## 2021-02-23 ENCOUNTER — TELEPHONE (OUTPATIENT)
Dept: DERMATOLOGY | Facility: CLINIC | Age: 76
End: 2021-02-23

## 2021-02-23 VITALS
BODY MASS INDEX: 26.84 KG/M2 | SYSTOLIC BLOOD PRESSURE: 146 MMHG | WEIGHT: 167 LBS | HEIGHT: 66 IN | DIASTOLIC BLOOD PRESSURE: 75 MMHG | HEART RATE: 63 BPM

## 2021-02-23 DIAGNOSIS — C44.319 BASAL CELL CARCINOMA OF FOREHEAD: Primary | ICD-10-CM

## 2021-02-23 PROCEDURE — 99499 NO LOS: ICD-10-PCS | Mod: S$GLB,,, | Performed by: DERMATOLOGY

## 2021-02-23 PROCEDURE — 13132 CMPLX RPR F/C/C/M/N/AX/G/H/F: CPT | Mod: S$GLB,,, | Performed by: DERMATOLOGY

## 2021-02-23 PROCEDURE — 99499 UNLISTED E&M SERVICE: CPT | Mod: S$GLB,,, | Performed by: DERMATOLOGY

## 2021-02-23 PROCEDURE — 17312 MOHS ADDL STAGE: CPT | Mod: S$GLB,,, | Performed by: DERMATOLOGY

## 2021-02-23 PROCEDURE — 17311: ICD-10-PCS | Mod: 51,S$GLB,, | Performed by: DERMATOLOGY

## 2021-02-23 PROCEDURE — 17312: ICD-10-PCS | Mod: S$GLB,,, | Performed by: DERMATOLOGY

## 2021-02-23 PROCEDURE — 17311 MOHS 1 STAGE H/N/HF/G: CPT | Mod: 51,S$GLB,, | Performed by: DERMATOLOGY

## 2021-02-23 PROCEDURE — 13132 PR RECMPL WND HEAD,FAC,HAND 2.6-7.5 CM: ICD-10-PCS | Mod: S$GLB,,, | Performed by: DERMATOLOGY

## 2021-02-23 RX ORDER — CEPHALEXIN 500 MG/1
500 CAPSULE ORAL 3 TIMES DAILY
Qty: 30 CAPSULE | Refills: 0 | Status: SHIPPED | OUTPATIENT
Start: 2021-02-23 | End: 2021-03-05

## 2021-02-23 RX ORDER — HYDROCODONE BITARTRATE AND ACETAMINOPHEN 5; 325 MG/1; MG/1
1 TABLET ORAL EVERY 6 HOURS PRN
Qty: 10 TABLET | Refills: 0 | Status: SHIPPED | OUTPATIENT
Start: 2021-02-23 | End: 2021-04-30

## 2021-02-26 ENCOUNTER — TELEPHONE (OUTPATIENT)
Dept: DERMATOLOGY | Facility: CLINIC | Age: 76
End: 2021-02-26

## 2021-02-28 ENCOUNTER — PATIENT MESSAGE (OUTPATIENT)
Dept: DERMATOLOGY | Facility: CLINIC | Age: 76
End: 2021-02-28

## 2021-03-01 ENCOUNTER — PATIENT MESSAGE (OUTPATIENT)
Dept: GASTROENTEROLOGY | Facility: CLINIC | Age: 76
End: 2021-03-01

## 2021-03-02 ENCOUNTER — PATIENT MESSAGE (OUTPATIENT)
Dept: GASTROENTEROLOGY | Facility: CLINIC | Age: 76
End: 2021-03-02

## 2021-03-02 ENCOUNTER — LAB VISIT (OUTPATIENT)
Dept: INTERNAL MEDICINE | Facility: CLINIC | Age: 76
End: 2021-03-02
Payer: COMMERCIAL

## 2021-03-02 ENCOUNTER — OFFICE VISIT (OUTPATIENT)
Dept: DERMATOLOGY | Facility: CLINIC | Age: 76
End: 2021-03-02
Payer: COMMERCIAL

## 2021-03-02 DIAGNOSIS — Z20.828 EXPOSURE TO SARS-ASSOCIATED CORONAVIRUS: ICD-10-CM

## 2021-03-02 DIAGNOSIS — Z09 POSTOP CHECK: Primary | ICD-10-CM

## 2021-03-02 PROCEDURE — 1101F PR PT FALLS ASSESS DOC 0-1 FALLS W/OUT INJ PAST YR: ICD-10-PCS | Mod: CPTII,S$GLB,, | Performed by: DERMATOLOGY

## 2021-03-02 PROCEDURE — 3288F FALL RISK ASSESSMENT DOCD: CPT | Mod: CPTII,S$GLB,, | Performed by: DERMATOLOGY

## 2021-03-02 PROCEDURE — 1126F AMNT PAIN NOTED NONE PRSNT: CPT | Mod: S$GLB,,, | Performed by: DERMATOLOGY

## 2021-03-02 PROCEDURE — U0003 INFECTIOUS AGENT DETECTION BY NUCLEIC ACID (DNA OR RNA); SEVERE ACUTE RESPIRATORY SYNDROME CORONAVIRUS 2 (SARS-COV-2) (CORONAVIRUS DISEASE [COVID-19]), AMPLIFIED PROBE TECHNIQUE, MAKING USE OF HIGH THROUGHPUT TECHNOLOGIES AS DESCRIBED BY CMS-2020-01-R: HCPCS

## 2021-03-02 PROCEDURE — 99999 PR PBB SHADOW E&M-EST. PATIENT-LVL II: ICD-10-PCS | Mod: PBBFAC,,, | Performed by: DERMATOLOGY

## 2021-03-02 PROCEDURE — 99999 PR PBB SHADOW E&M-EST. PATIENT-LVL II: CPT | Mod: PBBFAC,,, | Performed by: DERMATOLOGY

## 2021-03-02 PROCEDURE — 1126F PR PAIN SEVERITY QUANTIFIED, NO PAIN PRESENT: ICD-10-PCS | Mod: S$GLB,,, | Performed by: DERMATOLOGY

## 2021-03-02 PROCEDURE — U0005 INFEC AGEN DETEC AMPLI PROBE: HCPCS

## 2021-03-02 PROCEDURE — 3288F PR FALLS RISK ASSESSMENT DOCUMENTED: ICD-10-PCS | Mod: CPTII,S$GLB,, | Performed by: DERMATOLOGY

## 2021-03-02 PROCEDURE — 1101F PT FALLS ASSESS-DOCD LE1/YR: CPT | Mod: CPTII,S$GLB,, | Performed by: DERMATOLOGY

## 2021-03-02 PROCEDURE — 99024 POSTOP FOLLOW-UP VISIT: CPT | Mod: S$GLB,,, | Performed by: DERMATOLOGY

## 2021-03-02 PROCEDURE — 99024 PR POST-OP FOLLOW-UP VISIT: ICD-10-PCS | Mod: S$GLB,,, | Performed by: DERMATOLOGY

## 2021-03-02 RX ORDER — DICYCLOMINE HYDROCHLORIDE 20 MG/1
20 TABLET ORAL EVERY 6 HOURS
Qty: 120 TABLET | Refills: 0 | OUTPATIENT
Start: 2021-03-02 | End: 2021-04-01

## 2021-03-02 RX ORDER — DIPHENOXYLATE HYDROCHLORIDE AND ATROPINE SULFATE 2.5; .025 MG/1; MG/1
TABLET ORAL
Qty: 50 TABLET | Refills: 0 | OUTPATIENT
Start: 2021-03-02

## 2021-03-03 ENCOUNTER — TELEPHONE (OUTPATIENT)
Dept: GASTROENTEROLOGY | Facility: CLINIC | Age: 76
End: 2021-03-03

## 2021-03-03 DIAGNOSIS — R19.7 DIARRHEA, UNSPECIFIED TYPE: ICD-10-CM

## 2021-03-03 DIAGNOSIS — R10.9 ABDOMINAL CRAMPS: Primary | ICD-10-CM

## 2021-03-03 LAB — SARS-COV-2 RNA RESP QL NAA+PROBE: NOT DETECTED

## 2021-03-03 RX ORDER — DICYCLOMINE HYDROCHLORIDE 20 MG/1
20 TABLET ORAL EVERY 12 HOURS PRN
Qty: 60 TABLET | Refills: 0 | Status: SHIPPED | OUTPATIENT
Start: 2021-03-03 | End: 2022-05-10

## 2021-03-03 RX ORDER — DIPHENOXYLATE HYDROCHLORIDE AND ATROPINE SULFATE 2.5; .025 MG/1; MG/1
1 TABLET ORAL EVERY 12 HOURS PRN
Qty: 60 TABLET | Refills: 0 | Status: SHIPPED | OUTPATIENT
Start: 2021-03-03 | End: 2021-04-30

## 2021-03-15 ENCOUNTER — PATIENT MESSAGE (OUTPATIENT)
Dept: GASTROENTEROLOGY | Facility: CLINIC | Age: 76
End: 2021-03-15

## 2021-03-15 ENCOUNTER — LAB VISIT (OUTPATIENT)
Dept: LAB | Facility: HOSPITAL | Age: 76
End: 2021-03-15
Attending: INTERNAL MEDICINE
Payer: COMMERCIAL

## 2021-03-15 DIAGNOSIS — K50.10 CROHN'S DISEASE OF COLON WITHOUT COMPLICATION: ICD-10-CM

## 2021-03-15 LAB
ALBUMIN SERPL BCP-MCNC: 3.9 G/DL (ref 3.5–5.2)
ALP SERPL-CCNC: 64 U/L (ref 55–135)
ALT SERPL W/O P-5'-P-CCNC: 22 U/L (ref 10–44)
ANION GAP SERPL CALC-SCNC: 8 MMOL/L (ref 8–16)
AST SERPL-CCNC: 23 U/L (ref 10–40)
BASOPHILS # BLD AUTO: 0.08 K/UL (ref 0–0.2)
BASOPHILS NFR BLD: 1.6 % (ref 0–1.9)
BILIRUB SERPL-MCNC: 0.6 MG/DL (ref 0.1–1)
BUN SERPL-MCNC: 13 MG/DL (ref 8–23)
CALCIUM SERPL-MCNC: 8.7 MG/DL (ref 8.7–10.5)
CHLORIDE SERPL-SCNC: 104 MMOL/L (ref 95–110)
CO2 SERPL-SCNC: 26 MMOL/L (ref 23–29)
CREAT SERPL-MCNC: 0.8 MG/DL (ref 0.5–1.4)
CRP SERPL-MCNC: 7.2 MG/L (ref 0–8.2)
DIFFERENTIAL METHOD: ABNORMAL
EOSINOPHIL # BLD AUTO: 0.2 K/UL (ref 0–0.5)
EOSINOPHIL NFR BLD: 3.5 % (ref 0–8)
ERYTHROCYTE [DISTWIDTH] IN BLOOD BY AUTOMATED COUNT: 13.4 % (ref 11.5–14.5)
EST. GFR  (AFRICAN AMERICAN): >60 ML/MIN/1.73 M^2
EST. GFR  (NON AFRICAN AMERICAN): >60 ML/MIN/1.73 M^2
FERRITIN SERPL-MCNC: 77 NG/ML (ref 20–300)
GLUCOSE SERPL-MCNC: 100 MG/DL (ref 70–110)
HCT VFR BLD AUTO: 40.1 % (ref 40–54)
HGB BLD-MCNC: 13.1 G/DL (ref 14–18)
IMM GRANULOCYTES # BLD AUTO: 0.02 K/UL (ref 0–0.04)
IMM GRANULOCYTES NFR BLD AUTO: 0.4 % (ref 0–0.5)
IRON SERPL-MCNC: 61 UG/DL (ref 45–160)
LYMPHOCYTES # BLD AUTO: 1 K/UL (ref 1–4.8)
LYMPHOCYTES NFR BLD: 19.5 % (ref 18–48)
MCH RBC QN AUTO: 28.3 PG (ref 27–31)
MCHC RBC AUTO-ENTMCNC: 32.7 G/DL (ref 32–36)
MCV RBC AUTO: 87 FL (ref 82–98)
MONOCYTES # BLD AUTO: 0.7 K/UL (ref 0.3–1)
MONOCYTES NFR BLD: 13.8 % (ref 4–15)
NEUTROPHILS # BLD AUTO: 3 K/UL (ref 1.8–7.7)
NEUTROPHILS NFR BLD: 61.2 % (ref 38–73)
NRBC BLD-RTO: 0 /100 WBC
PLATELET # BLD AUTO: 278 K/UL (ref 150–350)
PMV BLD AUTO: 10.2 FL (ref 9.2–12.9)
POTASSIUM SERPL-SCNC: 3.9 MMOL/L (ref 3.5–5.1)
PROT SERPL-MCNC: 7.3 G/DL (ref 6–8.4)
RBC # BLD AUTO: 4.63 M/UL (ref 4.6–6.2)
SATURATED IRON: 18 % (ref 20–50)
SODIUM SERPL-SCNC: 138 MMOL/L (ref 136–145)
TOTAL IRON BINDING CAPACITY: 342 UG/DL (ref 250–450)
TRANSFERRIN SERPL-MCNC: 231 MG/DL (ref 200–375)
WBC # BLD AUTO: 4.92 K/UL (ref 3.9–12.7)

## 2021-03-15 PROCEDURE — 86787 VARICELLA-ZOSTER ANTIBODY: CPT | Performed by: INTERNAL MEDICINE

## 2021-03-15 PROCEDURE — 80053 COMPREHEN METABOLIC PANEL: CPT | Performed by: INTERNAL MEDICINE

## 2021-03-15 PROCEDURE — 86140 C-REACTIVE PROTEIN: CPT | Performed by: INTERNAL MEDICINE

## 2021-03-15 PROCEDURE — 82728 ASSAY OF FERRITIN: CPT | Performed by: INTERNAL MEDICINE

## 2021-03-15 PROCEDURE — 85025 COMPLETE CBC W/AUTO DIFF WBC: CPT | Performed by: INTERNAL MEDICINE

## 2021-03-15 PROCEDURE — 83540 ASSAY OF IRON: CPT | Performed by: INTERNAL MEDICINE

## 2021-03-15 PROCEDURE — 36415 COLL VENOUS BLD VENIPUNCTURE: CPT | Performed by: INTERNAL MEDICINE

## 2021-03-16 ENCOUNTER — TELEPHONE (OUTPATIENT)
Dept: DERMATOLOGY | Facility: CLINIC | Age: 76
End: 2021-03-16

## 2021-03-17 ENCOUNTER — PATIENT MESSAGE (OUTPATIENT)
Dept: GASTROENTEROLOGY | Facility: CLINIC | Age: 76
End: 2021-03-17

## 2021-03-17 LAB
VARICELLA INTERPRETATION: POSITIVE
VARICELLA ZOSTER IGG: 3.11 ISR (ref 0–0.9)

## 2021-03-18 ENCOUNTER — PATIENT MESSAGE (OUTPATIENT)
Dept: RESEARCH | Facility: HOSPITAL | Age: 76
End: 2021-03-18

## 2021-03-18 ENCOUNTER — PROCEDURE VISIT (OUTPATIENT)
Dept: DERMATOLOGY | Facility: CLINIC | Age: 76
End: 2021-03-18
Payer: COMMERCIAL

## 2021-03-18 VITALS
SYSTOLIC BLOOD PRESSURE: 146 MMHG | BODY MASS INDEX: 26.84 KG/M2 | HEART RATE: 70 BPM | WEIGHT: 167 LBS | HEIGHT: 66 IN | DIASTOLIC BLOOD PRESSURE: 75 MMHG

## 2021-03-18 DIAGNOSIS — C44.612 BASAL CELL CARCINOMA OF FOREARM, RIGHT: Primary | ICD-10-CM

## 2021-03-18 DIAGNOSIS — L91.0 HYPERTROPHIC SCAR OF SKIN: ICD-10-CM

## 2021-03-18 DIAGNOSIS — C44.529 SQUAMOUS CELL CARCINOMA, TRUNK: ICD-10-CM

## 2021-03-18 PROCEDURE — 17314 MOHS ADDL STAGE T/A/L: CPT | Mod: S$GLB,,, | Performed by: DERMATOLOGY

## 2021-03-18 PROCEDURE — 11900 INJECT SKIN LESIONS </W 7: CPT | Mod: 59,S$GLB,, | Performed by: DERMATOLOGY

## 2021-03-18 PROCEDURE — 99499 UNLISTED E&M SERVICE: CPT | Mod: S$GLB,,, | Performed by: DERMATOLOGY

## 2021-03-18 PROCEDURE — 13121 PR RECMPL WND SCALP,EXTR 2.6-7.5 CM: ICD-10-PCS | Mod: S$GLB,,, | Performed by: DERMATOLOGY

## 2021-03-18 PROCEDURE — 13122 PR REP,SKIN,SCALP/EXTREM+5 CM/<: ICD-10-PCS | Mod: S$GLB,,, | Performed by: DERMATOLOGY

## 2021-03-18 PROCEDURE — 13121 CMPLX RPR S/A/L 2.6-7.5 CM: CPT | Mod: S$GLB,,, | Performed by: DERMATOLOGY

## 2021-03-18 PROCEDURE — 99499 NO LOS: ICD-10-PCS | Mod: S$GLB,,, | Performed by: DERMATOLOGY

## 2021-03-18 PROCEDURE — 17313 MOHS 1 STAGE T/A/L: CPT | Mod: S$GLB,,, | Performed by: DERMATOLOGY

## 2021-03-18 PROCEDURE — 11900 PR INJECTION INTO SKIN LESIONS, UP TO 7: ICD-10-PCS | Mod: 59,S$GLB,, | Performed by: DERMATOLOGY

## 2021-03-18 PROCEDURE — 13122 CMPLX RPR S/A/L ADDL 5 CM/>: CPT | Mod: S$GLB,,, | Performed by: DERMATOLOGY

## 2021-03-18 PROCEDURE — 17314: ICD-10-PCS | Mod: S$GLB,,, | Performed by: DERMATOLOGY

## 2021-03-18 PROCEDURE — 17313: ICD-10-PCS | Mod: S$GLB,,, | Performed by: DERMATOLOGY

## 2021-03-19 ENCOUNTER — PATIENT MESSAGE (OUTPATIENT)
Dept: GASTROENTEROLOGY | Facility: CLINIC | Age: 76
End: 2021-03-19

## 2021-03-19 RX ORDER — MESALAMINE 1.2 G/1
4.8 TABLET, DELAYED RELEASE ORAL
Qty: 360 TABLET | Refills: 3 | Status: SHIPPED | OUTPATIENT
Start: 2021-03-19 | End: 2022-01-31 | Stop reason: SDUPTHER

## 2021-03-26 ENCOUNTER — PATIENT MESSAGE (OUTPATIENT)
Dept: RESEARCH | Facility: HOSPITAL | Age: 76
End: 2021-03-26

## 2021-03-27 ENCOUNTER — PATIENT MESSAGE (OUTPATIENT)
Dept: GASTROENTEROLOGY | Facility: CLINIC | Age: 76
End: 2021-03-27

## 2021-03-29 ENCOUNTER — TELEPHONE (OUTPATIENT)
Dept: GASTROENTEROLOGY | Facility: CLINIC | Age: 76
End: 2021-03-29

## 2021-03-31 ENCOUNTER — OFFICE VISIT (OUTPATIENT)
Dept: DERMATOLOGY | Facility: CLINIC | Age: 76
End: 2021-03-31
Payer: COMMERCIAL

## 2021-03-31 DIAGNOSIS — Z09 POSTOP CHECK: Primary | ICD-10-CM

## 2021-03-31 PROCEDURE — 1101F PR PT FALLS ASSESS DOC 0-1 FALLS W/OUT INJ PAST YR: ICD-10-PCS | Mod: CPTII,S$GLB,, | Performed by: DERMATOLOGY

## 2021-03-31 PROCEDURE — 1126F PR PAIN SEVERITY QUANTIFIED, NO PAIN PRESENT: ICD-10-PCS | Mod: S$GLB,,, | Performed by: DERMATOLOGY

## 2021-03-31 PROCEDURE — 99999 PR PBB SHADOW E&M-EST. PATIENT-LVL I: ICD-10-PCS | Mod: PBBFAC,,, | Performed by: DERMATOLOGY

## 2021-03-31 PROCEDURE — 3288F PR FALLS RISK ASSESSMENT DOCUMENTED: ICD-10-PCS | Mod: CPTII,S$GLB,, | Performed by: DERMATOLOGY

## 2021-03-31 PROCEDURE — 99024 PR POST-OP FOLLOW-UP VISIT: ICD-10-PCS | Mod: S$GLB,,, | Performed by: DERMATOLOGY

## 2021-03-31 PROCEDURE — 1126F AMNT PAIN NOTED NONE PRSNT: CPT | Mod: S$GLB,,, | Performed by: DERMATOLOGY

## 2021-03-31 PROCEDURE — 99999 PR PBB SHADOW E&M-EST. PATIENT-LVL I: CPT | Mod: PBBFAC,,, | Performed by: DERMATOLOGY

## 2021-03-31 PROCEDURE — 99024 POSTOP FOLLOW-UP VISIT: CPT | Mod: S$GLB,,, | Performed by: DERMATOLOGY

## 2021-03-31 PROCEDURE — 1101F PT FALLS ASSESS-DOCD LE1/YR: CPT | Mod: CPTII,S$GLB,, | Performed by: DERMATOLOGY

## 2021-03-31 PROCEDURE — 3288F FALL RISK ASSESSMENT DOCD: CPT | Mod: CPTII,S$GLB,, | Performed by: DERMATOLOGY

## 2021-04-30 ENCOUNTER — OFFICE VISIT (OUTPATIENT)
Dept: GASTROENTEROLOGY | Facility: CLINIC | Age: 76
End: 2021-04-30
Payer: COMMERCIAL

## 2021-04-30 VITALS
HEIGHT: 66 IN | BODY MASS INDEX: 26.89 KG/M2 | SYSTOLIC BLOOD PRESSURE: 138 MMHG | RESPIRATION RATE: 16 BRPM | TEMPERATURE: 98 F | OXYGEN SATURATION: 97 % | DIASTOLIC BLOOD PRESSURE: 80 MMHG | WEIGHT: 167.31 LBS | HEART RATE: 78 BPM

## 2021-04-30 DIAGNOSIS — K50.10 CROHN'S DISEASE OF COLON WITHOUT COMPLICATION: Primary | ICD-10-CM

## 2021-04-30 DIAGNOSIS — K86.89 PANCREATIC INSUFFICIENCY: ICD-10-CM

## 2021-04-30 PROCEDURE — 1101F PT FALLS ASSESS-DOCD LE1/YR: CPT | Mod: CPTII,S$GLB,, | Performed by: INTERNAL MEDICINE

## 2021-04-30 PROCEDURE — 1159F PR MEDICATION LIST DOCUMENTED IN MEDICAL RECORD: ICD-10-PCS | Mod: S$GLB,,, | Performed by: INTERNAL MEDICINE

## 2021-04-30 PROCEDURE — 99213 PR OFFICE/OUTPT VISIT, EST, LEVL III, 20-29 MIN: ICD-10-PCS | Mod: S$GLB,,, | Performed by: INTERNAL MEDICINE

## 2021-04-30 PROCEDURE — 3288F FALL RISK ASSESSMENT DOCD: CPT | Mod: CPTII,S$GLB,, | Performed by: INTERNAL MEDICINE

## 2021-04-30 PROCEDURE — 1126F AMNT PAIN NOTED NONE PRSNT: CPT | Mod: S$GLB,,, | Performed by: INTERNAL MEDICINE

## 2021-04-30 PROCEDURE — 99213 OFFICE O/P EST LOW 20 MIN: CPT | Mod: S$GLB,,, | Performed by: INTERNAL MEDICINE

## 2021-04-30 PROCEDURE — 1126F PR PAIN SEVERITY QUANTIFIED, NO PAIN PRESENT: ICD-10-PCS | Mod: S$GLB,,, | Performed by: INTERNAL MEDICINE

## 2021-04-30 PROCEDURE — 1159F MED LIST DOCD IN RCRD: CPT | Mod: S$GLB,,, | Performed by: INTERNAL MEDICINE

## 2021-04-30 PROCEDURE — 3288F PR FALLS RISK ASSESSMENT DOCUMENTED: ICD-10-PCS | Mod: CPTII,S$GLB,, | Performed by: INTERNAL MEDICINE

## 2021-04-30 PROCEDURE — 1101F PR PT FALLS ASSESS DOC 0-1 FALLS W/OUT INJ PAST YR: ICD-10-PCS | Mod: CPTII,S$GLB,, | Performed by: INTERNAL MEDICINE

## 2021-04-30 RX ORDER — HYDROCORTISONE 25 MG/G
CREAM TOPICAL
COMMUNITY
Start: 2021-04-26 | End: 2022-11-28

## 2021-04-30 RX ORDER — ESZOPICLONE 2 MG/1
2 TABLET, FILM COATED ORAL NIGHTLY
COMMUNITY
Start: 2021-04-14 | End: 2021-07-09 | Stop reason: SDUPTHER

## 2021-04-30 RX ORDER — MESALAMINE 1000 MG/1
1000 SUPPOSITORY RECTAL NIGHTLY
Qty: 30 SUPPOSITORY | Refills: 5 | Status: SHIPPED | OUTPATIENT
Start: 2021-04-30 | End: 2021-05-30

## 2021-04-30 RX ORDER — CLINDAMYCIN PHOSPHATE 10 MG/ML
SOLUTION TOPICAL
COMMUNITY
Start: 2021-04-26 | End: 2023-05-22

## 2021-04-30 RX ORDER — TRIAMCINOLONE ACETONIDE 1 MG/G
CREAM TOPICAL
COMMUNITY
Start: 2021-04-26 | End: 2022-11-28

## 2021-05-04 ENCOUNTER — TELEPHONE (OUTPATIENT)
Dept: DERMATOLOGY | Facility: CLINIC | Age: 76
End: 2021-05-04

## 2021-05-05 ENCOUNTER — TELEPHONE (OUTPATIENT)
Dept: DERMATOLOGY | Facility: CLINIC | Age: 76
End: 2021-05-05

## 2021-05-13 ENCOUNTER — TELEPHONE (OUTPATIENT)
Dept: DERMATOLOGY | Facility: CLINIC | Age: 76
End: 2021-05-13

## 2021-05-17 ENCOUNTER — TELEPHONE (OUTPATIENT)
Dept: DERMATOLOGY | Facility: CLINIC | Age: 76
End: 2021-05-17

## 2021-05-17 ENCOUNTER — PATIENT MESSAGE (OUTPATIENT)
Dept: DERMATOLOGY | Facility: CLINIC | Age: 76
End: 2021-05-17

## 2021-05-26 ENCOUNTER — TELEPHONE (OUTPATIENT)
Dept: DERMATOLOGY | Facility: CLINIC | Age: 76
End: 2021-05-26

## 2021-05-27 ENCOUNTER — TELEPHONE (OUTPATIENT)
Dept: DERMATOLOGY | Facility: CLINIC | Age: 76
End: 2021-05-27

## 2021-06-01 ENCOUNTER — TELEPHONE (OUTPATIENT)
Dept: DERMATOLOGY | Facility: CLINIC | Age: 76
End: 2021-06-01

## 2021-06-09 ENCOUNTER — TELEPHONE (OUTPATIENT)
Dept: DERMATOLOGY | Facility: CLINIC | Age: 76
End: 2021-06-09

## 2021-06-10 ENCOUNTER — PROCEDURE VISIT (OUTPATIENT)
Dept: DERMATOLOGY | Facility: CLINIC | Age: 76
End: 2021-06-10
Payer: COMMERCIAL

## 2021-06-10 VITALS
DIASTOLIC BLOOD PRESSURE: 81 MMHG | HEIGHT: 66 IN | BODY MASS INDEX: 26.84 KG/M2 | SYSTOLIC BLOOD PRESSURE: 129 MMHG | HEART RATE: 69 BPM | WEIGHT: 167 LBS

## 2021-06-10 DIAGNOSIS — C44.329 SQUAMOUS CELL CARCINOMA OF SKIN OF LEFT CHEEK: Primary | ICD-10-CM

## 2021-06-10 PROCEDURE — 17311 MOHS 1 STAGE H/N/HF/G: CPT | Mod: 51,S$GLB,, | Performed by: DERMATOLOGY

## 2021-06-10 PROCEDURE — 99499 NO LOS: ICD-10-PCS | Mod: S$GLB,,, | Performed by: DERMATOLOGY

## 2021-06-10 PROCEDURE — 13131 CMPLX RPR F/C/C/M/N/AX/G/H/F: CPT | Mod: S$GLB,,, | Performed by: DERMATOLOGY

## 2021-06-10 PROCEDURE — 17312: ICD-10-PCS | Mod: S$GLB,,, | Performed by: DERMATOLOGY

## 2021-06-10 PROCEDURE — 17311: ICD-10-PCS | Mod: 51,S$GLB,, | Performed by: DERMATOLOGY

## 2021-06-10 PROCEDURE — 13131 PR RECMPL WND HEAD,FAC,HAND 1.1-2.5 CM: ICD-10-PCS | Mod: S$GLB,,, | Performed by: DERMATOLOGY

## 2021-06-10 PROCEDURE — 17312 MOHS ADDL STAGE: CPT | Mod: S$GLB,,, | Performed by: DERMATOLOGY

## 2021-06-10 PROCEDURE — 99499 UNLISTED E&M SERVICE: CPT | Mod: S$GLB,,, | Performed by: DERMATOLOGY

## 2021-06-18 ENCOUNTER — TELEPHONE (OUTPATIENT)
Dept: DERMATOLOGY | Facility: CLINIC | Age: 76
End: 2021-06-18

## 2021-06-18 ENCOUNTER — CLINICAL SUPPORT (OUTPATIENT)
Dept: DERMATOLOGY | Facility: CLINIC | Age: 76
End: 2021-06-18
Payer: COMMERCIAL

## 2021-06-24 ENCOUNTER — TELEPHONE (OUTPATIENT)
Dept: GASTROENTEROLOGY | Facility: CLINIC | Age: 76
End: 2021-06-24

## 2021-07-22 ENCOUNTER — LAB VISIT (OUTPATIENT)
Dept: LAB | Facility: HOSPITAL | Age: 76
End: 2021-07-22
Attending: INTERNAL MEDICINE
Payer: COMMERCIAL

## 2021-07-22 ENCOUNTER — TELEPHONE (OUTPATIENT)
Dept: GASTROENTEROLOGY | Facility: CLINIC | Age: 76
End: 2021-07-22

## 2021-07-22 DIAGNOSIS — K50.10 CROHN'S DISEASE OF COLON WITHOUT COMPLICATION: ICD-10-CM

## 2021-07-22 PROCEDURE — 83993 ASSAY FOR CALPROTECTIN FECAL: CPT | Performed by: INTERNAL MEDICINE

## 2021-07-27 LAB — CALPROTECTIN STL-MCNT: 140.5 MCG/G

## 2021-07-28 ENCOUNTER — TELEPHONE (OUTPATIENT)
Dept: GASTROENTEROLOGY | Facility: CLINIC | Age: 76
End: 2021-07-28

## 2021-07-30 ENCOUNTER — OFFICE VISIT (OUTPATIENT)
Dept: GASTROENTEROLOGY | Facility: CLINIC | Age: 76
End: 2021-07-30
Payer: COMMERCIAL

## 2021-07-30 DIAGNOSIS — K86.89 PANCREATIC INSUFFICIENCY: ICD-10-CM

## 2021-07-30 DIAGNOSIS — K50.10 CROHN'S DISEASE OF COLON WITHOUT COMPLICATION: Primary | ICD-10-CM

## 2021-07-30 PROCEDURE — 1126F AMNT PAIN NOTED NONE PRSNT: CPT | Mod: CPTII,,, | Performed by: INTERNAL MEDICINE

## 2021-07-30 PROCEDURE — 1101F PR PT FALLS ASSESS DOC 0-1 FALLS W/OUT INJ PAST YR: ICD-10-PCS | Mod: CPTII,,, | Performed by: INTERNAL MEDICINE

## 2021-07-30 PROCEDURE — 1101F PT FALLS ASSESS-DOCD LE1/YR: CPT | Mod: CPTII,,, | Performed by: INTERNAL MEDICINE

## 2021-07-30 PROCEDURE — 1159F PR MEDICATION LIST DOCUMENTED IN MEDICAL RECORD: ICD-10-PCS | Mod: CPTII,,, | Performed by: INTERNAL MEDICINE

## 2021-07-30 PROCEDURE — 3288F FALL RISK ASSESSMENT DOCD: CPT | Mod: CPTII,,, | Performed by: INTERNAL MEDICINE

## 2021-07-30 PROCEDURE — 1126F PR PAIN SEVERITY QUANTIFIED, NO PAIN PRESENT: ICD-10-PCS | Mod: CPTII,,, | Performed by: INTERNAL MEDICINE

## 2021-07-30 PROCEDURE — 3288F PR FALLS RISK ASSESSMENT DOCUMENTED: ICD-10-PCS | Mod: CPTII,,, | Performed by: INTERNAL MEDICINE

## 2021-07-30 PROCEDURE — 99214 PR OFFICE/OUTPT VISIT, EST, LEVL IV, 30-39 MIN: ICD-10-PCS | Mod: 95,,, | Performed by: INTERNAL MEDICINE

## 2021-07-30 PROCEDURE — 1159F MED LIST DOCD IN RCRD: CPT | Mod: CPTII,,, | Performed by: INTERNAL MEDICINE

## 2021-07-30 PROCEDURE — 99214 OFFICE O/P EST MOD 30 MIN: CPT | Mod: 95,,, | Performed by: INTERNAL MEDICINE

## 2021-08-04 ENCOUNTER — LAB VISIT (OUTPATIENT)
Dept: LAB | Facility: OTHER | Age: 76
End: 2021-08-04
Attending: INTERNAL MEDICINE
Payer: COMMERCIAL

## 2021-08-04 DIAGNOSIS — Z20.822 EXPOSURE TO COVID-19 VIRUS: ICD-10-CM

## 2021-08-04 LAB
SARS-COV-2 IGG SERPL IA-ACNC: 333.9 AU/ML
SARS-COV-2 IGG SERPL QL IA: POSITIVE

## 2021-08-04 PROCEDURE — 36415 COLL VENOUS BLD VENIPUNCTURE: CPT | Performed by: INTERNAL MEDICINE

## 2021-08-04 PROCEDURE — 86769 SARS-COV-2 COVID-19 ANTIBODY: CPT | Performed by: INTERNAL MEDICINE

## 2021-12-02 ENCOUNTER — DOCUMENTATION ONLY (OUTPATIENT)
Dept: GASTROENTEROLOGY | Facility: HOSPITAL | Age: 76
End: 2021-12-02
Payer: COMMERCIAL

## 2021-12-02 ENCOUNTER — TELEPHONE (OUTPATIENT)
Dept: ENDOSCOPY | Facility: HOSPITAL | Age: 76
End: 2021-12-02
Payer: COMMERCIAL

## 2021-12-02 ENCOUNTER — PATIENT MESSAGE (OUTPATIENT)
Dept: ENDOSCOPY | Facility: HOSPITAL | Age: 76
End: 2021-12-02
Payer: COMMERCIAL

## 2021-12-02 ENCOUNTER — TELEPHONE (OUTPATIENT)
Dept: GASTROENTEROLOGY | Facility: HOSPITAL | Age: 76
End: 2021-12-02
Payer: COMMERCIAL

## 2021-12-02 DIAGNOSIS — R13.10 DYSPHAGIA, UNSPECIFIED TYPE: Primary | ICD-10-CM

## 2021-12-02 DIAGNOSIS — R13.19 ESOPHAGEAL DYSPHAGIA: Primary | ICD-10-CM

## 2021-12-02 RX ORDER — PANTOPRAZOLE SODIUM 40 MG/1
40 TABLET, DELAYED RELEASE ORAL
Qty: 90 TABLET | Refills: 0 | Status: SHIPPED | OUTPATIENT
Start: 2021-12-02 | End: 2022-02-16 | Stop reason: SDUPTHER

## 2021-12-07 ENCOUNTER — OFFICE VISIT (OUTPATIENT)
Dept: UROLOGY | Facility: CLINIC | Age: 76
End: 2021-12-07
Payer: COMMERCIAL

## 2021-12-07 VITALS
SYSTOLIC BLOOD PRESSURE: 130 MMHG | BODY MASS INDEX: 25.96 KG/M2 | DIASTOLIC BLOOD PRESSURE: 80 MMHG | HEIGHT: 67 IN | WEIGHT: 165.38 LBS

## 2021-12-07 DIAGNOSIS — N40.2 PROSTATE NODULE: Primary | ICD-10-CM

## 2021-12-07 DIAGNOSIS — N52.9 ED (ERECTILE DYSFUNCTION) OF ORGANIC ORIGIN: ICD-10-CM

## 2021-12-07 DIAGNOSIS — N39.42 URINARY INCONTINENCE WITHOUT SENSORY AWARENESS: ICD-10-CM

## 2021-12-07 PROCEDURE — 99213 OFFICE O/P EST LOW 20 MIN: CPT | Mod: S$GLB,,, | Performed by: UROLOGY

## 2021-12-07 PROCEDURE — 99213 PR OFFICE/OUTPT VISIT, EST, LEVL III, 20-29 MIN: ICD-10-PCS | Mod: S$GLB,,, | Performed by: UROLOGY

## 2021-12-07 PROCEDURE — 99999 PR PBB SHADOW E&M-EST. PATIENT-LVL IV: ICD-10-PCS | Mod: PBBFAC,,, | Performed by: UROLOGY

## 2021-12-07 PROCEDURE — 99999 PR PBB SHADOW E&M-EST. PATIENT-LVL IV: CPT | Mod: PBBFAC,,, | Performed by: UROLOGY

## 2021-12-07 RX ORDER — TADALAFIL 20 MG/1
20 TABLET ORAL DAILY PRN
Qty: 10 TABLET | Refills: 11 | Status: SHIPPED | OUTPATIENT
Start: 2021-12-07 | End: 2022-08-10 | Stop reason: SDUPTHER

## 2021-12-16 ENCOUNTER — OFFICE VISIT (OUTPATIENT)
Dept: UROLOGY | Facility: CLINIC | Age: 76
End: 2021-12-16
Payer: COMMERCIAL

## 2021-12-16 ENCOUNTER — PROCEDURE VISIT (OUTPATIENT)
Dept: UROLOGY | Facility: CLINIC | Age: 76
End: 2021-12-16
Payer: COMMERCIAL

## 2021-12-16 ENCOUNTER — LAB VISIT (OUTPATIENT)
Dept: LAB | Facility: HOSPITAL | Age: 76
End: 2021-12-16
Attending: UROLOGY
Payer: COMMERCIAL

## 2021-12-16 VITALS
RESPIRATION RATE: 16 BRPM | WEIGHT: 167 LBS | DIASTOLIC BLOOD PRESSURE: 64 MMHG | BODY MASS INDEX: 26.21 KG/M2 | HEART RATE: 76 BPM | SYSTOLIC BLOOD PRESSURE: 127 MMHG | TEMPERATURE: 98 F | HEIGHT: 67 IN

## 2021-12-16 DIAGNOSIS — N39.42 URINARY INCONTINENCE WITHOUT SENSORY AWARENESS: ICD-10-CM

## 2021-12-16 LAB
CREAT SERPL-MCNC: 0.8 MG/DL (ref 0.5–1.4)
EST. GFR  (AFRICAN AMERICAN): >60 ML/MIN/1.73 M^2
EST. GFR  (NON AFRICAN AMERICAN): >60 ML/MIN/1.73 M^2

## 2021-12-16 PROCEDURE — 99215 PR OFFICE/OUTPT VISIT, EST, LEVL V, 40-54 MIN: ICD-10-PCS | Mod: 25,S$GLB,, | Performed by: UROLOGY

## 2021-12-16 PROCEDURE — 99999 PR PBB SHADOW E&M-EST. PATIENT-LVL III: ICD-10-PCS | Mod: PBBFAC,,, | Performed by: UROLOGY

## 2021-12-16 PROCEDURE — 99999 PR PBB SHADOW E&M-EST. PATIENT-LVL III: CPT | Mod: PBBFAC,,, | Performed by: UROLOGY

## 2021-12-16 PROCEDURE — 36415 COLL VENOUS BLD VENIPUNCTURE: CPT | Performed by: UROLOGY

## 2021-12-16 PROCEDURE — 52000 CYSTOSCOPY: ICD-10-PCS | Mod: S$GLB,,, | Performed by: UROLOGY

## 2021-12-16 PROCEDURE — 82565 ASSAY OF CREATININE: CPT | Performed by: UROLOGY

## 2021-12-16 PROCEDURE — 52000 CYSTOURETHROSCOPY: CPT | Mod: S$GLB,,, | Performed by: UROLOGY

## 2021-12-16 PROCEDURE — 99215 OFFICE O/P EST HI 40 MIN: CPT | Mod: 25,S$GLB,, | Performed by: UROLOGY

## 2021-12-16 RX ORDER — LIDOCAINE HYDROCHLORIDE 20 MG/ML
JELLY TOPICAL
Status: COMPLETED | OUTPATIENT
Start: 2021-12-16 | End: 2021-12-16

## 2021-12-16 RX ADMIN — LIDOCAINE HYDROCHLORIDE: 20 JELLY TOPICAL at 01:12

## 2021-12-20 ENCOUNTER — TELEPHONE (OUTPATIENT)
Dept: ENDOSCOPY | Facility: HOSPITAL | Age: 76
End: 2021-12-20
Payer: COMMERCIAL

## 2021-12-26 ENCOUNTER — PATIENT MESSAGE (OUTPATIENT)
Dept: UROLOGY | Facility: CLINIC | Age: 76
End: 2021-12-26
Payer: COMMERCIAL

## 2021-12-26 DIAGNOSIS — N32.81 OAB (OVERACTIVE BLADDER): Primary | ICD-10-CM

## 2022-01-01 ENCOUNTER — PATIENT MESSAGE (OUTPATIENT)
Dept: UROLOGY | Facility: CLINIC | Age: 77
End: 2022-01-01
Payer: COMMERCIAL

## 2022-01-05 ENCOUNTER — TELEPHONE (OUTPATIENT)
Dept: INTERNAL MEDICINE | Facility: CLINIC | Age: 77
End: 2022-01-05
Payer: COMMERCIAL

## 2022-01-05 NOTE — TELEPHONE ENCOUNTER
Pt had potential exposure to Covid on 12/28 (and maybe earlier) and tested positive on 1/2. His wife tested positive as well on 12/31 and 1/2. They are both asymptomatic. Have travel planned to [facility] on 12/13. [facility].    I recommend they retest on 1/10 and will arrange at my office  [email or web address]

## 2022-01-10 ENCOUNTER — LAB VISIT (OUTPATIENT)
Dept: INTERNAL MEDICINE | Facility: CLINIC | Age: 77
End: 2022-01-10
Payer: COMMERCIAL

## 2022-01-10 DIAGNOSIS — U07.1 COVID-19 VIRUS DETECTED: ICD-10-CM

## 2022-01-10 DIAGNOSIS — Z11.52 ENCOUNTER FOR SCREENING FOR COVID-19: Primary | ICD-10-CM

## 2022-01-10 LAB
CTP QC/QA: YES
SARS-COV-2 RDRP RESP QL NAA+PROBE: POSITIVE

## 2022-01-10 PROCEDURE — U0002: ICD-10-PCS | Mod: QW,S$GLB,, | Performed by: INTERNAL MEDICINE

## 2022-01-10 PROCEDURE — U0002 COVID-19 LAB TEST NON-CDC: HCPCS | Mod: QW,S$GLB,, | Performed by: INTERNAL MEDICINE

## 2022-01-25 ENCOUNTER — TELEPHONE (OUTPATIENT)
Dept: GASTROENTEROLOGY | Facility: CLINIC | Age: 77
End: 2022-01-25
Payer: COMMERCIAL

## 2022-01-31 ENCOUNTER — TELEPHONE (OUTPATIENT)
Dept: GASTROENTEROLOGY | Facility: CLINIC | Age: 77
End: 2022-01-31
Payer: COMMERCIAL

## 2022-01-31 ENCOUNTER — OFFICE VISIT (OUTPATIENT)
Dept: GASTROENTEROLOGY | Facility: CLINIC | Age: 77
End: 2022-01-31
Payer: COMMERCIAL

## 2022-01-31 VITALS
OXYGEN SATURATION: 97 % | DIASTOLIC BLOOD PRESSURE: 78 MMHG | BODY MASS INDEX: 26.35 KG/M2 | TEMPERATURE: 98 F | WEIGHT: 168.19 LBS | SYSTOLIC BLOOD PRESSURE: 127 MMHG | HEART RATE: 73 BPM

## 2022-01-31 DIAGNOSIS — R13.19 ESOPHAGEAL DYSPHAGIA: Primary | ICD-10-CM

## 2022-01-31 DIAGNOSIS — K50.10 CROHN'S DISEASE OF COLON WITHOUT COMPLICATION: ICD-10-CM

## 2022-01-31 DIAGNOSIS — K86.81 EXOCRINE PANCREATIC INSUFFICIENCY: ICD-10-CM

## 2022-01-31 PROCEDURE — 1160F RVW MEDS BY RX/DR IN RCRD: CPT | Mod: CPTII,S$GLB,, | Performed by: INTERNAL MEDICINE

## 2022-01-31 PROCEDURE — 99214 OFFICE O/P EST MOD 30 MIN: CPT | Mod: S$GLB,,, | Performed by: INTERNAL MEDICINE

## 2022-01-31 PROCEDURE — 1101F PT FALLS ASSESS-DOCD LE1/YR: CPT | Mod: CPTII,S$GLB,, | Performed by: INTERNAL MEDICINE

## 2022-01-31 PROCEDURE — 1159F MED LIST DOCD IN RCRD: CPT | Mod: CPTII,S$GLB,, | Performed by: INTERNAL MEDICINE

## 2022-01-31 PROCEDURE — 1159F PR MEDICATION LIST DOCUMENTED IN MEDICAL RECORD: ICD-10-PCS | Mod: CPTII,S$GLB,, | Performed by: INTERNAL MEDICINE

## 2022-01-31 PROCEDURE — 1126F PR PAIN SEVERITY QUANTIFIED, NO PAIN PRESENT: ICD-10-PCS | Mod: CPTII,S$GLB,, | Performed by: INTERNAL MEDICINE

## 2022-01-31 PROCEDURE — 3078F DIAST BP <80 MM HG: CPT | Mod: CPTII,S$GLB,, | Performed by: INTERNAL MEDICINE

## 2022-01-31 PROCEDURE — 99214 PR OFFICE/OUTPT VISIT, EST, LEVL IV, 30-39 MIN: ICD-10-PCS | Mod: S$GLB,,, | Performed by: INTERNAL MEDICINE

## 2022-01-31 PROCEDURE — 3074F PR MOST RECENT SYSTOLIC BLOOD PRESSURE < 130 MM HG: ICD-10-PCS | Mod: CPTII,S$GLB,, | Performed by: INTERNAL MEDICINE

## 2022-01-31 PROCEDURE — 3288F FALL RISK ASSESSMENT DOCD: CPT | Mod: CPTII,S$GLB,, | Performed by: INTERNAL MEDICINE

## 2022-01-31 PROCEDURE — 1160F PR REVIEW ALL MEDS BY PRESCRIBER/CLIN PHARMACIST DOCUMENTED: ICD-10-PCS | Mod: CPTII,S$GLB,, | Performed by: INTERNAL MEDICINE

## 2022-01-31 PROCEDURE — 1101F PR PT FALLS ASSESS DOC 0-1 FALLS W/OUT INJ PAST YR: ICD-10-PCS | Mod: CPTII,S$GLB,, | Performed by: INTERNAL MEDICINE

## 2022-01-31 PROCEDURE — 3078F PR MOST RECENT DIASTOLIC BLOOD PRESSURE < 80 MM HG: ICD-10-PCS | Mod: CPTII,S$GLB,, | Performed by: INTERNAL MEDICINE

## 2022-01-31 PROCEDURE — 3074F SYST BP LT 130 MM HG: CPT | Mod: CPTII,S$GLB,, | Performed by: INTERNAL MEDICINE

## 2022-01-31 PROCEDURE — 1126F AMNT PAIN NOTED NONE PRSNT: CPT | Mod: CPTII,S$GLB,, | Performed by: INTERNAL MEDICINE

## 2022-01-31 PROCEDURE — 3288F PR FALLS RISK ASSESSMENT DOCUMENTED: ICD-10-PCS | Mod: CPTII,S$GLB,, | Performed by: INTERNAL MEDICINE

## 2022-01-31 RX ORDER — MESALAMINE 1.2 G/1
4.8 TABLET, DELAYED RELEASE ORAL
Qty: 360 TABLET | Refills: 3 | Status: SHIPPED | OUTPATIENT
Start: 2022-01-31 | End: 2022-04-26

## 2022-01-31 NOTE — PATIENT INSTRUCTIONS
1. Continue mesalamine  2. Schedule EGD and colonoscopy  3. Labs in March  4. Follow up in 6 months

## 2022-01-31 NOTE — PROGRESS NOTES
[facility]          Inflammatory Bowel Disease Follow Up Note         TODAY'S VISIT DATE:  1/31/2022    Reason for Consult:    Chief Complaint   Patient presents with    Crohn's Disease       PCP: Edward Sheppard      Referring MD:   No ref. provider found    History of Present Illness:  Alejandro Wayne who is a 76 y.o. male is being seen today at the [facility] on 01/31/2022 for inflammatory bowel disease- Crohn's disease.  He continues to do very well.  He is typically having 1-2 formed bowel movements daily with no blood in the stools.  He has no abdominal pain.  He is taking Creon as previously prescribed.  He is also on Lialda 4.8 g daily.  He occasionally has an episode of loose bowel movements but this is very rare.  He did notice a rare episode of a small amount of blood on the outside of his stool.  There is no blood mixed with the stool.  He did have 1 episode of dysphagia to solids back in December.  An upper endoscopy was ordered but it was never scheduled at the patient's request.  He denies any new complaints today.    IBD History:  He has had several years of intermittent gastrointestinal problems.  He was initially seen by Dr. Mathews in November of 2015 with complaints of chronic diarrhea.  At that time he was found to have a low fecal elastase level consistent with pancreatic insufficiency.  Endoscopic ultrasound was performed and found changes consistent with mild chronic pancreatitis.  He has a history of chronic alcohol use.  A CT scan was also performed around that time which showed changes consistent with diverticulitis.  In June of 2016 he underwent a screening colonoscopy which revealed a normal terminal ileum, multiple diverticula in the sigmoid, descending, transverse, and ascending colon.  There was also some moderately erythematous mucosa in the mid sigmoid and distal sigmoid colon and biopsies were taken which did  not show any significant features.  In October 2018 he had a repeat CT scan done because of abdominal pain and he was again found to have multiple diverticula as well as some luminal narrowing and wall thickening and changes consistent with sigmoid diverticulitis.  In October 2019 he underwent an upper endoscopy and colonoscopy because of iron deficiency anemia and diarrhea.  The upper endoscopy revealed a small hiatal hernia but was otherwise normal.  Colonoscopy revealed a normal terminal ileum, multiple diverticula, moderately erythematous mucosa in the sigmoid colon with purulent exudate consistent with acute diverticulitis.  A CT scan again revealed changes with diverticulitis.  In November 2019 he was seen in follow-up by Colorectal surgery and a partial colectomy was recommended because of recurrent diverticulitis.  This was delayed due to family medical issues.  In May of this year he presented to Colorectal surgery Clinic again with complaints of loose stools.  A repeat CT scan showed finding similar to all of his previous CT scans.  He underwent colonoscopy on October 5, 2020 because of ongoing diarrhea issues and was found to have inflamed ulcerated mucosa in the anus and rectum, sigmoid colon, distal descending colon.  The more proximal colon appeared normal.  There was also a mild anal stricture.  Biopsies from this colonoscopy revealed a normal terminal ileum.  Biopsies of the right colon revealed chronic and superficial acute colitis.  Left colon biopsies revealed chronic colitis as well as at least 1 rectal biopsy revealing an intramucosal granuloma.  He was started on Lialda in November 2020. He had a significant improvement after starting Lialda but at his 1st follow-up he was noted only be taking 1.2 g daily.  We increase the dose at that time to 2.4 g daily.  In March 2021 he had a continued elevation of his fecal calprotectin level so the dose was increased to 4.8 g daily.  His CRP had  normalized.  A repeat stool calprotectin level in July 2021 showed a significant decline in the stool calprotectin.    IBD Details:  Dx Date: 10/5/2020  Disease type/distribution:  Crohn's disease/colonic involvement (mostly rectum and sigmoid but with some mild descending inflammation endoscopically and more proximal colon involved histologically)  Current Treatment:  Lialda 4.8 g daily  Start Date:  November 2020  Response:  Unclear  Optimized:  Yes Adverse reactions:  None  Prior surgeries:  None  CRP Elevation: Y calprotectin:  Markedly elevated with active disease  Disease Complications:  None  Extraintestinal manifestations:  None  Prior treatments:   Steroids:  None  5ASA:  Currently on Lialda  IMM:  None  TNF Inh:  None   Anti-Integrin:  None   IL 12/23:  None  ADDISON Inh:  None    Previous Clinical Trials:  None    Last Colonoscopy:  October 5, 2020-moderate inflammation of the rectum with less severe inflammation of the sigmoid and descending colon.  Remainder of the colon was normal appearing other diverticular disease.  Biopsies with pan colitis.  Ileum was normal and biopsies of the ileum were normal.    Other Endoscopies:  Multiple previous colonoscopy reports reviewed with findings of inflammation in the sigmoid colon most consistent with segmental colitis associated with diverticular disease verses diverticulitis    Imaging:   MRE:  None   CT:  May 2020-no evidence of small-bowel inflammation   Other:  None    Pertinent Labs:  Lab Results   Component Value Date    SEDRATE 16 (H) 10/08/2018    CRP 7.2 03/15/2021     Lab Results   Component Value Date    TTGIGA 9 05/04/2020     05/04/2020     Lab Results   Component Value Date    TSH 1.965 05/04/2020     Lab Results   Component Value Date    PZCWMVLM66PR 36 10/19/2020    XGPQAWJH85 642 10/19/2020     Lab Results   Component Value Date    HEPBSAG Negative 11/23/2015    HEPCAB Negative 02/07/2019     No results found for: JQG75BPZN  Lab Results    Component Value Date    NIL 0.050 10/19/2020    MITOGENNIL 3.840 10/19/2020     No results found for: TPTMINTERP, TPMTRESULT  Lab Results   Component Value Date    STOOLCULTURE  05/15/2020     No Salmonella,Shigella,Vibrio,Campylobacter,Yersinia isolated.    DJHIMBJIWJ4H Negative 05/15/2020    ECSVECSTRD9T Negative 05/15/2020    CDIFFICILEAN Negative 05/15/2020    CDIFFTOX Negative 05/15/2020     Lab Results   Component Value Date    CALPROTECTIN 140.5 (H) 07/22/2021       Therapeutic Drug Monitoring Labs:  No results found for: PROMETH  No results found for: ANSADAINIT, INFLIXIMAB, INFLIXINTERP    Vaccinations:  No results found for: HEPBSAB  Lab Results   Component Value Date    HEPAIGG Negative 10/19/2020     Lab Results   Component Value Date    VARICELLAZOS 3.11 (H) 03/15/2021    VARICELLAINT Positive (A) 03/15/2021     Immunization History   Administered Date(s) Administered    COVID-19, MRNA, LN-S, PF (Pfizer) (Purple Cap) 01/09/2021, 01/30/2021, 09/16/2021    Hepatitis A, Adult 03/01/2016    Influenza - High Dose - PF (65 years and older) 10/15/2013, 09/10/2014, 10/24/2015, 12/13/2016, 10/02/2017, 09/11/2018    Influenza - Quadrivalent 09/18/2019    Influenza - Quadrivalent - High Dose - PF (65 years and older) 08/25/2020    Influenza Whole 09/01/2015    Pneumococcal Conjugate - 13 Valent 03/01/2016, 12/13/2016    Pneumococcal Polysaccharide - 23 Valent 09/10/2014    Tdap 03/01/2016    Zoster 11/10/2014         Review of Systems  Review of Systems   Constitutional: Negative for chills, fever and weight loss.   HENT: Negative for sore throat.    Eyes: Negative for pain, discharge and redness.   Respiratory: Negative for cough, shortness of breath and wheezing.    Cardiovascular: Negative for chest pain and leg swelling.   Gastrointestinal: Negative for abdominal pain, blood in stool, constipation, diarrhea, heartburn, melena, nausea and vomiting.   Genitourinary: Negative for dysuria and  frequency.   Musculoskeletal: Negative for back pain, joint pain and myalgias.   Skin: Negative for rash.   Neurological: Negative for focal weakness and seizures.   Endo/Heme/Allergies: Does not bruise/bleed easily.   Psychiatric/Behavioral: Negative for depression. The patient is not nervous/anxious.        Medical History:   Past Medical History:   Diagnosis Date    Basal cell carcinoma     BPH (benign prostatic hyperplasia)     Chronic nonallergic rhinitis 5/28/2020    Colon polyp     Crohn's disease     Hyperlipidemia     Hypertension     Liver cyst 1/11/2016    Prostate nodule     Squamous cell carcinoma     Thyroid nodule 5/28/2020       Surgical History:  Past Surgical History:   Procedure Laterality Date    APPENDECTOMY      COLONOSCOPY N/A 6/22/2016    Procedure: COLONOSCOPY;  Surgeon: Marco Mathews MD;  Location: [facility] (4TH FLR);  Service: Endoscopy;  Laterality: N/A;    COLONOSCOPY N/A 10/21/2019    Procedure: COLONOSCOPY;  Surgeon: Marco Mathews MD;  Location: [facility] (4TH FLR);  Service: Endoscopy;  Laterality: N/A;  First case of the day with me next available     COLONOSCOPY N/A 10/5/2020    Procedure: COLONOSCOPY;  Surgeon: LIZABETH Hall MD;  Location: [facility] (4TH FLR);  Service: Endoscopy;  Laterality: N/A;    COLONOSCOPY W/ POLYPECTOMY      ENDOSCOPIC ULTRASOUND OF UPPER GASTROINTESTINAL TRACT N/A 6/24/2019    Procedure: ULTRASOUND, UPPER GI TRACT, ENDOSCOPIC;  Surgeon: Jeremy Saleem MD;  Location: [facility] (2ND FLR);  Service: Endoscopy;  Laterality: N/A;  For evaluation of low fecal elastase and loose stools and history of nonspecific finding of the EUS of the pancreas    ESOPHAGOGASTRODUODENOSCOPY      ESOPHAGOGASTRODUODENOSCOPY N/A 6/24/2019    Procedure: EGD (ESOPHAGOGASTRODUODENOSCOPY);  Surgeon: Jeremy Saleem MD;  Location: [facility] (2ND FLR);  Service: Endoscopy;  Laterality: N/A;  For evaluation of iron deficiency anemia     ESOPHAGOGASTRODUODENOSCOPY N/A 10/21/2019    Procedure: EGD (ESOPHAGOGASTRODUODENOSCOPY);  Surgeon: Marco Mathews MD;  Location: [address];  Service: Endoscopy;  Laterality: N/A;  First case of the day with me next available     HERNIA REPAIR      KNEE ARTHROSCOPY W/ DEBRIDEMENT      TONSILLECTOMY, ADENOIDECTOMY      UPPER ENDOSCOPIC ULTRASOUND W/ FNA      VASECTOMY         Family History:   Family History   Problem Relation Age of Onset    Cancer Mother         melanoma    Cancer Father         skin cancer    Celiac disease Neg Hx     Cirrhosis Neg Hx     Colon cancer Neg Hx     Colon polyps Neg Hx     Crohn's disease Neg Hx     Esophageal cancer Neg Hx     Inflammatory bowel disease Neg Hx     Irritable bowel syndrome Neg Hx     Liver cancer Neg Hx     Rectal cancer Neg Hx     Stomach cancer Neg Hx     Ulcerative colitis Neg Hx        Social History:   Social History     Tobacco Use    Smoking status: Former Smoker     Packs/day: 0.50     Years: 4.00     Pack years: 2.00     Types: Cigarettes     Quit date: 1966     Years since quittin.7    Smokeless tobacco: Never Used    Tobacco comment: as teenager   Substance Use Topics    Alcohol use: Yes     Alcohol/week: 14.0 standard drinks     Types: 14 Glasses of wine per week     Comment: social    Drug use: No       Allergies: Reviewed    Home Medications:   Medication List with Changes/Refills   Current Medications    AMLODIPINE (NORVASC) 5 MG TABLET    TAKE 1 TABLET BY MOUTH EVERY DAY    ASCORBIC ACID, VITAMIN C, (VITAMIN C) 1000 MG TABLET    Take 1,000 mg by mouth once daily.    ASPIRIN (ECOTRIN) 81 MG EC TABLET    Take 81 mg by mouth once daily.    CELECOXIB (CELEBREX) 200 MG CAPSULE    Take 1 capsule (200 mg total) by mouth 2 (two) times daily as needed for Pain.    CETIRIZINE (ZYRTEC) 10 MG TABLET    Take 10 mg by mouth once daily.    CLINDAMYCIN (CLEOCIN T) 1 % SWAB    APPLY EXTERNALLY TO THE AFFECTED AREA TWICE  DAILY TO BIOPSY SITE    CREON 36,000-114,000- 180,000 UNIT CPDR    TAKE 3 CAPSULES BY MOUTH THREE TIMES DAILY WITH MEALS. TAKE 1 CAPSULE BY MOUTH WITH EVERY SNACK. DO NOT EXCEED 12 CAPSULE DAILY AS DIRECTED    DIPHENOXYLATE-ATROPINE 2.5-0.025 MG (LOMOTIL) 2.5-0.025 MG PER TABLET    1-2 pills qid prn diarrhea    ESZOPICLONE (LUNESTA) 2 MG TAB    Take 1 tablet (2 mg total) by mouth nightly.    FLUOROURACIL (EFUDEX) 5 % CREAM        HYDROCORTISONE 2.5 % CREAM    APPLY EXTERNALLY TO THE AFFECTED AREA TO FACE TWICE DAILY FOR 5 TO 7 DAYS AS DIRECTED    MIRABEGRON (MYRBETRIQ) 25 MG TB24 ER TABLET    Take 1 tablet (25 mg total) by mouth once daily.    MULTIVITAMIN (THERAGRAN) PER TABLET    Take 1 tablet by mouth once daily.    PANTOPRAZOLE (PROTONIX) 40 MG TABLET    Take 1 tablet (40 mg total) by mouth before breakfast.    ROSUVASTATIN (CRESTOR) 20 MG TABLET    TAKE 1 TABLET(20 MG) BY MOUTH EVERY DAY    TADALAFIL (CIALIS) 20 MG TAB    Take 1 tablet (20 mg total) by mouth daily as needed (as needed).    TRIAMCINOLONE ACETONIDE 0.1% (KENALOG) 0.1 % CREAM    APPLY EXTERNALLY TO THE AFFECTED AREA ON ARMS TWICE DAILY FOR 10 TO 14 DAYS AS DIRECTED   Changed and/or Refilled Medications    Modified Medication Previous Medication    MESALAMINE (LIALDA) 1.2 GRAM TBEC mesalamine (LIALDA) 1.2 gram TbEC       Take 4 tablets (4.8 g total) by mouth daily with breakfast.    Take 4 tablets (4.8 g total) by mouth daily with breakfast.   Discontinued Medications    ROSUVASTATIN (CRESTOR) 10 MG TABLET    TAKE 1 TABLET(10 MG) BY MOUTH EVERY DAY       Physical Exam:  Vital Signs:  /78 (BP Location: Right arm, Patient Position: Sitting)   Pulse 73   Temp 97.9 °F (36.6 °C)   Wt 76.3 kg (168 lb 3.4 oz)   SpO2 97%   BMI 26.35 kg/m²   Body mass index is 26.35 kg/m².    Physical Exam  Vitals and nursing note reviewed.   Constitutional:       General: He is not in acute distress.     Appearance: Normal appearance. He is well-developed. He is  not ill-appearing or toxic-appearing.   HENT:      Head: Normocephalic and atraumatic.   Eyes:      General: No scleral icterus.     Pupils: Pupils are equal, round, and reactive to light.   Neck:      Thyroid: No thyromegaly.   Cardiovascular:      Rate and Rhythm: Normal rate and regular rhythm.      Heart sounds: No murmur heard.  No friction rub.   Pulmonary:      Effort: Pulmonary effort is normal.      Breath sounds: Normal breath sounds. No wheezing or rales.   Abdominal:      General: Bowel sounds are normal. There is no distension.      Palpations: Abdomen is soft. There is no mass.      Tenderness: There is no abdominal tenderness. There is no guarding or rebound.   Lymphadenopathy:      Cervical: No cervical adenopathy.   Skin:     Findings: No rash.   Neurological:      General: No focal deficit present.      Mental Status: He is alert and oriented to person, place, and time.   Psychiatric:         Mood and Affect: Mood normal.         Behavior: Behavior normal.         Thought Content: Thought content normal.         Judgment: Judgment normal.         Labs: reviewed and pertinent noted above    Assessment/Plan:  Alejandro Wayne is a 76 y.o. male with Crohn's colitis. The following issues were addresssed:    1. Esophageal dysphagia    2. Crohn's disease of colon without complication    3. Exocrine pancreatic insufficiency      1.  Crohn's disease:  He continues to do very well.  I recommend he continue taking Lialda 4.8 g daily.  We will arrange for colonoscopy in the near future.  He will be due for labs in mid March so these were ordered today as well.    2.  Pancreatic insufficiency:  Continue Creon.    3. Dysphagia:  Arrange for upper endoscopy the time of his colonoscopy.      Ex smoker:  - recommended to continue smoking cessation  - discussed in detail that Crohn's disease can worsen with smoking and may make patient more resistant to treatment    # IBD specific health maintenance:  Colon cancer  surveillance:  Up-to-date    Annual:  - Eye exam:  Discuss in the future  - Skin exam (if on IMM/TNF):  Scheduled  - reminded pt to use sunblock/hats/sunprotective clothing  - PAP (if immunosuppressed):  Not applicable    DEXA:  Discuss in the future-recommended due to age    Vitamin D:  Normal    Vaccines:    Influenza:  Up-to-date   Pneumovax:     PCV13:  Up-to-date    PSV23:  Up-to-date   HAV:  Discuss vaccination in the future   HBV:  Discuss vaccination in the future   Tdap:  Up-to-date   MMR:  Up-to-date   VZV:  Immune   HZV:  Ordered at previous visit   HPV:  Not applicable   Meningococcus:  Not applicable   COVID:  Completed series and booster    Follow up: Follow up in about 6 months (around 7/31/2022).    Thank you again for sending Alejandro Wayne to see Dr. Yury Castro today at the [facility]. Please don't hesitate to contact Dr. Castro if there are any questions regarding this evaluation, or if you have any other patients with inflammatory bowel disease for whom you would like a consultation. You can reach Dr. Castro at [phone] or by email at [email or web address]    Aidan Castro MD  Department of Gastroenterology  Inflammatory Bowel Disease

## 2022-02-15 ENCOUNTER — PATIENT MESSAGE (OUTPATIENT)
Dept: GASTROENTEROLOGY | Facility: CLINIC | Age: 77
End: 2022-02-15
Payer: COMMERCIAL

## 2022-02-15 DIAGNOSIS — R13.19 ESOPHAGEAL DYSPHAGIA: ICD-10-CM

## 2022-02-16 RX ORDER — PANTOPRAZOLE SODIUM 40 MG/1
40 TABLET, DELAYED RELEASE ORAL
Qty: 90 TABLET | Refills: 0 | Status: SHIPPED | OUTPATIENT
Start: 2022-02-16 | End: 2022-03-07

## 2022-02-24 ENCOUNTER — PATIENT MESSAGE (OUTPATIENT)
Dept: UROLOGY | Facility: CLINIC | Age: 77
End: 2022-02-24
Payer: COMMERCIAL

## 2022-02-24 ENCOUNTER — TELEPHONE (OUTPATIENT)
Dept: UROLOGY | Facility: CLINIC | Age: 77
End: 2022-02-24
Payer: COMMERCIAL

## 2022-03-11 ENCOUNTER — TELEPHONE (OUTPATIENT)
Dept: ENDOSCOPY | Facility: HOSPITAL | Age: 77
End: 2022-03-11
Payer: COMMERCIAL

## 2022-04-18 ENCOUNTER — LAB VISIT (OUTPATIENT)
Dept: INTERNAL MEDICINE | Facility: CLINIC | Age: 77
End: 2022-04-18
Payer: COMMERCIAL

## 2022-04-18 DIAGNOSIS — Z11.52 ENCOUNTER FOR SCREENING FOR COVID-19: Primary | ICD-10-CM

## 2022-04-18 LAB
CTP QC/QA: YES
SARS-COV-2 RDRP RESP QL NAA+PROBE: NEGATIVE

## 2022-04-18 PROCEDURE — U0002: ICD-10-PCS | Mod: QW,S$GLB,, | Performed by: INTERNAL MEDICINE

## 2022-04-18 PROCEDURE — U0002 COVID-19 LAB TEST NON-CDC: HCPCS | Mod: QW,S$GLB,, | Performed by: INTERNAL MEDICINE

## 2022-04-26 DIAGNOSIS — Z79.899 ENCOUNTER FOR MONITORING LONG-TERM PROTON PUMP INHIBITOR THERAPY: ICD-10-CM

## 2022-04-26 DIAGNOSIS — Z51.81 ENCOUNTER FOR MONITORING LONG-TERM PROTON PUMP INHIBITOR THERAPY: ICD-10-CM

## 2022-04-26 DIAGNOSIS — K44.9 HIATAL HERNIA: ICD-10-CM

## 2022-04-26 DIAGNOSIS — K21.9 GASTROESOPHAGEAL REFLUX DISEASE, UNSPECIFIED WHETHER ESOPHAGITIS PRESENT: Primary | ICD-10-CM

## 2022-04-27 ENCOUNTER — PATIENT MESSAGE (OUTPATIENT)
Dept: GASTROENTEROLOGY | Facility: CLINIC | Age: 77
End: 2022-04-27
Payer: COMMERCIAL

## 2022-04-28 ENCOUNTER — TELEPHONE (OUTPATIENT)
Dept: GASTROENTEROLOGY | Facility: CLINIC | Age: 77
End: 2022-04-28
Payer: COMMERCIAL

## 2022-04-28 ENCOUNTER — PATIENT MESSAGE (OUTPATIENT)
Dept: ENDOSCOPY | Facility: HOSPITAL | Age: 77
End: 2022-04-28
Payer: COMMERCIAL

## 2022-04-28 DIAGNOSIS — R39.15 URINARY URGENCY: Primary | ICD-10-CM

## 2022-04-29 ENCOUNTER — LAB VISIT (OUTPATIENT)
Dept: LAB | Facility: HOSPITAL | Age: 77
End: 2022-04-29
Attending: INTERNAL MEDICINE
Payer: COMMERCIAL

## 2022-04-29 DIAGNOSIS — Z51.81 ENCOUNTER FOR MONITORING LONG-TERM PROTON PUMP INHIBITOR THERAPY: ICD-10-CM

## 2022-04-29 DIAGNOSIS — K44.9 HIATAL HERNIA: ICD-10-CM

## 2022-04-29 DIAGNOSIS — Z79.899 ENCOUNTER FOR MONITORING LONG-TERM PROTON PUMP INHIBITOR THERAPY: ICD-10-CM

## 2022-04-29 DIAGNOSIS — K21.9 GASTROESOPHAGEAL REFLUX DISEASE, UNSPECIFIED WHETHER ESOPHAGITIS PRESENT: ICD-10-CM

## 2022-04-29 LAB
25(OH)D3+25(OH)D2 SERPL-MCNC: 36 NG/ML (ref 30–96)
ALBUMIN SERPL BCP-MCNC: 4.2 G/DL (ref 3.5–5.2)
ALP SERPL-CCNC: 74 U/L (ref 55–135)
ALT SERPL W/O P-5'-P-CCNC: 24 U/L (ref 10–44)
ANION GAP SERPL CALC-SCNC: 11 MMOL/L (ref 8–16)
AST SERPL-CCNC: 22 U/L (ref 10–40)
BASOPHILS # BLD AUTO: 0.1 K/UL (ref 0–0.2)
BASOPHILS NFR BLD: 1.9 % (ref 0–1.9)
BILIRUB SERPL-MCNC: 0.5 MG/DL (ref 0.1–1)
BUN SERPL-MCNC: 12 MG/DL (ref 8–23)
CALCIUM SERPL-MCNC: 9.5 MG/DL (ref 8.7–10.5)
CHLORIDE SERPL-SCNC: 103 MMOL/L (ref 95–110)
CO2 SERPL-SCNC: 26 MMOL/L (ref 23–29)
CREAT SERPL-MCNC: 0.8 MG/DL (ref 0.5–1.4)
DIFFERENTIAL METHOD: ABNORMAL
EOSINOPHIL # BLD AUTO: 0.4 K/UL (ref 0–0.5)
EOSINOPHIL NFR BLD: 7.2 % (ref 0–8)
ERYTHROCYTE [DISTWIDTH] IN BLOOD BY AUTOMATED COUNT: 13.5 % (ref 11.5–14.5)
EST. GFR  (AFRICAN AMERICAN): >60 ML/MIN/1.73 M^2
EST. GFR  (NON AFRICAN AMERICAN): >60 ML/MIN/1.73 M^2
FERRITIN SERPL-MCNC: 107 NG/ML (ref 20–300)
GLUCOSE SERPL-MCNC: 104 MG/DL (ref 70–110)
HCT VFR BLD AUTO: 41.7 % (ref 40–54)
HGB BLD-MCNC: 13.5 G/DL (ref 14–18)
IMM GRANULOCYTES # BLD AUTO: 0.02 K/UL (ref 0–0.04)
IMM GRANULOCYTES NFR BLD AUTO: 0.4 % (ref 0–0.5)
IRON SERPL-MCNC: 64 UG/DL (ref 45–160)
LYMPHOCYTES # BLD AUTO: 0.6 K/UL (ref 1–4.8)
LYMPHOCYTES NFR BLD: 12.2 % (ref 18–48)
MAGNESIUM SERPL-MCNC: 2.2 MG/DL (ref 1.6–2.6)
MCH RBC QN AUTO: 27.8 PG (ref 27–31)
MCHC RBC AUTO-ENTMCNC: 32.4 G/DL (ref 32–36)
MCV RBC AUTO: 86 FL (ref 82–98)
MONOCYTES # BLD AUTO: 0.7 K/UL (ref 0.3–1)
MONOCYTES NFR BLD: 12.5 % (ref 4–15)
NEUTROPHILS # BLD AUTO: 3.5 K/UL (ref 1.8–7.7)
NEUTROPHILS NFR BLD: 65.8 % (ref 38–73)
NRBC BLD-RTO: 0 /100 WBC
PLATELET # BLD AUTO: 290 K/UL (ref 150–450)
PMV BLD AUTO: 10 FL (ref 9.2–12.9)
POTASSIUM SERPL-SCNC: 4 MMOL/L (ref 3.5–5.1)
PROT SERPL-MCNC: 7.6 G/DL (ref 6–8.4)
RBC # BLD AUTO: 4.85 M/UL (ref 4.6–6.2)
SATURATED IRON: 19 % (ref 20–50)
SODIUM SERPL-SCNC: 140 MMOL/L (ref 136–145)
TOTAL IRON BINDING CAPACITY: 342 UG/DL (ref 250–450)
TRANSFERRIN SERPL-MCNC: 231 MG/DL (ref 200–375)
VIT B12 SERPL-MCNC: 627 PG/ML (ref 210–950)
WBC # BLD AUTO: 5.26 K/UL (ref 3.9–12.7)

## 2022-04-29 PROCEDURE — 82607 VITAMIN B-12: CPT | Performed by: INTERNAL MEDICINE

## 2022-04-29 PROCEDURE — 82306 VITAMIN D 25 HYDROXY: CPT | Performed by: INTERNAL MEDICINE

## 2022-04-29 PROCEDURE — 36415 COLL VENOUS BLD VENIPUNCTURE: CPT | Performed by: INTERNAL MEDICINE

## 2022-04-29 PROCEDURE — 84466 ASSAY OF TRANSFERRIN: CPT | Performed by: INTERNAL MEDICINE

## 2022-04-29 PROCEDURE — 80053 COMPREHEN METABOLIC PANEL: CPT | Performed by: INTERNAL MEDICINE

## 2022-04-29 PROCEDURE — 83735 ASSAY OF MAGNESIUM: CPT | Performed by: INTERNAL MEDICINE

## 2022-04-29 PROCEDURE — 82728 ASSAY OF FERRITIN: CPT | Performed by: INTERNAL MEDICINE

## 2022-04-29 PROCEDURE — 85025 COMPLETE CBC W/AUTO DIFF WBC: CPT | Performed by: INTERNAL MEDICINE

## 2022-05-01 DIAGNOSIS — D50.9 IRON DEFICIENCY ANEMIA, UNSPECIFIED IRON DEFICIENCY ANEMIA TYPE: Primary | ICD-10-CM

## 2022-05-01 RX ORDER — FERROUS GLUCONATE 324(38)MG
324 TABLET ORAL
Qty: 90 TABLET | Refills: 1 | Status: SHIPPED | OUTPATIENT
Start: 2022-05-01 | End: 2022-10-27

## 2022-05-01 NOTE — PROGRESS NOTES
Terry - please tell patient that they are iron deficient and anemic and recommend that they take ferrous gluconate one 324mg pill once daily for next 3 months.    Please order repeat fasting Hemoglobin, Iron/TIBC, and Ferritin in 8 weeks - Orders placed.     Terry please remind patient to schedule EGD colonoscopy as ordered by Dr. Castro.  Thank you    Case Request Endoscopy: ESOPHAGOGASTRODUODENOSCOPY (EGD), COLONOSCOPY [GI5] (Order [id number])  Case Request  Date and Time: 1/31/2022  9:06 AM Department: [facility] Gastroenterology And Inflammatory Bowel Disease Rel By/Authorizing: Aidan Castro MD

## 2022-05-05 ENCOUNTER — PATIENT MESSAGE (OUTPATIENT)
Dept: GASTROENTEROLOGY | Facility: CLINIC | Age: 77
End: 2022-05-05
Payer: COMMERCIAL

## 2022-05-06 ENCOUNTER — TELEPHONE (OUTPATIENT)
Dept: GASTROENTEROLOGY | Facility: CLINIC | Age: 77
End: 2022-05-06
Payer: COMMERCIAL

## 2022-05-13 ENCOUNTER — PATIENT MESSAGE (OUTPATIENT)
Dept: GASTROENTEROLOGY | Facility: CLINIC | Age: 77
End: 2022-05-13
Payer: COMMERCIAL

## 2022-05-13 ENCOUNTER — DOCUMENTATION ONLY (OUTPATIENT)
Dept: GASTROENTEROLOGY | Facility: CLINIC | Age: 77
End: 2022-05-13
Payer: COMMERCIAL

## 2022-05-13 NOTE — PROGRESS NOTES
Safety and Health Consideration for Your Review    1. High Risk Medication: DICYCLOMINE HCL Use in Seniors  Your older patient is receiving DICYCLOMINE HCL based on claims records. The Beers criteria advise that  antispasmodic agents should generally be avoided in seniors due to their potent anticholinergic effects and uncertain  effectiveness. Drug-induced dizziness and drowsiness may result in an increased risk of falls.  Reference(s):    1. Gastrointestinal Anticholinergics/ Antispasmodics. In: Emanuel MAYER, Jessica LOVELL, eds. Drug Facts and Comparisons  [database online]. [place], [place]: [employer] 2017.  2. The 2019 American Geriatrics Society Beers Criteria Update Expert Panel. American Geriatrics Society 2019 Updated  AGS Beers Criteria for Potentially Inappropriate Medication Use in Older Adults. Journal of American Geriatric Society.  2019;67(4):674-694. DOI:10.1111/jgs.53426

## 2022-05-23 DIAGNOSIS — R13.19 ESOPHAGEAL DYSPHAGIA: ICD-10-CM

## 2022-05-24 RX ORDER — PANTOPRAZOLE SODIUM 40 MG/1
40 TABLET, DELAYED RELEASE ORAL
Qty: 90 TABLET | Refills: 0 | Status: SHIPPED | OUTPATIENT
Start: 2022-05-24 | End: 2022-08-02 | Stop reason: SDUPTHER

## 2022-05-26 ENCOUNTER — TELEPHONE (OUTPATIENT)
Dept: UROLOGY | Facility: CLINIC | Age: 77
End: 2022-05-26
Payer: COMMERCIAL

## 2022-05-26 DIAGNOSIS — Z12.11 SPECIAL SCREENING FOR MALIGNANT NEOPLASMS, COLON: Primary | ICD-10-CM

## 2022-05-26 RX ORDER — POLYETHYLENE GLYCOL 3350, SODIUM SULFATE ANHYDROUS, SODIUM BICARBONATE, SODIUM CHLORIDE, POTASSIUM CHLORIDE 236; 22.74; 6.74; 5.86; 2.97 G/4L; G/4L; G/4L; G/4L; G/4L
4 POWDER, FOR SOLUTION ORAL ONCE
Qty: 4000 ML | Refills: 0 | Status: SHIPPED | OUTPATIENT
Start: 2022-05-26 | End: 2022-05-26

## 2022-05-26 NOTE — TELEPHONE ENCOUNTER
----- Message from Stefania Fernandez sent at 5/26/2022  8:59 AM CDT -----  Regarding: Order  Contact: [phone]  Calling to get updated orders put in Epic for CT. Please call and schedule.

## 2022-06-07 DIAGNOSIS — K86.81 EXOCRINE PANCREATIC INSUFFICIENCY: ICD-10-CM

## 2022-06-20 ENCOUNTER — PATIENT MESSAGE (OUTPATIENT)
Dept: ENDOSCOPY | Facility: HOSPITAL | Age: 77
End: 2022-06-20
Payer: COMMERCIAL

## 2022-06-20 ENCOUNTER — TELEPHONE (OUTPATIENT)
Dept: ENDOSCOPY | Facility: HOSPITAL | Age: 77
End: 2022-06-20
Payer: COMMERCIAL

## 2022-06-20 NOTE — TELEPHONE ENCOUNTER
Dr Castro-  Patient had to cancel his EGD and colonoscopy for 6/24/22 due to a death in the family. He is rescheduled on 8/5/22.    He has lab work to be drawn on 6/27/22 from Dr Mathews.  However, he wanted me to message you to see if he is need of any additional lab work.  He wanted to know-since he won't be seeing you for awhile, did you want to add any additional labs since he will be missing the appointment on Friday?  Please advise.  Thank you.    Jana

## 2022-06-21 NOTE — TELEPHONE ENCOUNTER
June 21, 2022    Me     GT    8:22 AM  Note  Patient notified.           June 20, 2022    Aidan Castro MD  to Me        3:42 PM  I don't need any other labs     GT    11:33 AM  You routed this conversation to Aidan Castro MD       Me     GT    11:33 AM  Note  Dr Castro-  Patient had to cancel his EGD and colonoscopy for 6/24/22 due to a death in the family. He is rescheduled on 8/5/22.     He has lab work to be drawn on 6/27/22 from Dr Mathews.  However, he wanted me to message you to see if he is need of any additional lab work.  He wanted to know-since he won't be seeing you for awhile, did you want to add any additional labs since he will be missing the appointment on Friday?  Please advise.  Thank you.     Jana

## 2022-06-27 ENCOUNTER — LAB VISIT (OUTPATIENT)
Dept: LAB | Facility: HOSPITAL | Age: 77
End: 2022-06-27
Attending: INTERNAL MEDICINE
Payer: COMMERCIAL

## 2022-06-27 DIAGNOSIS — D50.9 IRON DEFICIENCY ANEMIA, UNSPECIFIED IRON DEFICIENCY ANEMIA TYPE: ICD-10-CM

## 2022-06-27 LAB
FERRITIN SERPL-MCNC: 71 NG/ML (ref 20–300)
HGB BLD-MCNC: 13.7 G/DL (ref 14–18)
IRON SERPL-MCNC: 64 UG/DL (ref 45–160)
SATURATED IRON: 17 % (ref 20–50)
TOTAL IRON BINDING CAPACITY: 367 UG/DL (ref 250–450)
TRANSFERRIN SERPL-MCNC: 248 MG/DL (ref 200–375)

## 2022-06-27 PROCEDURE — 84466 ASSAY OF TRANSFERRIN: CPT | Performed by: INTERNAL MEDICINE

## 2022-06-27 PROCEDURE — 36415 COLL VENOUS BLD VENIPUNCTURE: CPT | Performed by: INTERNAL MEDICINE

## 2022-06-27 PROCEDURE — 82728 ASSAY OF FERRITIN: CPT | Performed by: INTERNAL MEDICINE

## 2022-06-27 PROCEDURE — 85018 HEMOGLOBIN: CPT | Performed by: INTERNAL MEDICINE

## 2022-07-25 ENCOUNTER — PATIENT MESSAGE (OUTPATIENT)
Dept: GASTROENTEROLOGY | Facility: CLINIC | Age: 77
End: 2022-07-25
Payer: COMMERCIAL

## 2022-07-26 ENCOUNTER — PATIENT MESSAGE (OUTPATIENT)
Dept: UROLOGY | Facility: CLINIC | Age: 77
End: 2022-07-26
Payer: COMMERCIAL

## 2022-07-29 DIAGNOSIS — R13.19 ESOPHAGEAL DYSPHAGIA: ICD-10-CM

## 2022-07-29 RX ORDER — PANTOPRAZOLE SODIUM 40 MG/1
40 TABLET, DELAYED RELEASE ORAL
Qty: 90 TABLET | Refills: 0 | Status: CANCELLED | OUTPATIENT
Start: 2022-07-29 | End: 2022-10-27

## 2022-08-02 DIAGNOSIS — R13.19 ESOPHAGEAL DYSPHAGIA: ICD-10-CM

## 2022-08-02 RX ORDER — PANTOPRAZOLE SODIUM 40 MG/1
40 TABLET, DELAYED RELEASE ORAL
Qty: 90 TABLET | Refills: 0 | Status: SHIPPED | OUTPATIENT
Start: 2022-08-02 | End: 2022-11-02

## 2022-08-04 DIAGNOSIS — N39.42 URINARY INCONTINENCE WITHOUT SENSORY AWARENESS: Primary | ICD-10-CM

## 2022-08-05 ENCOUNTER — HOSPITAL ENCOUNTER (OUTPATIENT)
Facility: HOSPITAL | Age: 77
Discharge: HOME | End: 2022-08-05
Attending: INTERNAL MEDICINE | Admitting: INTERNAL MEDICINE
Payer: COMMERCIAL

## 2022-08-05 ENCOUNTER — ANESTHESIA (OUTPATIENT)
Dept: ENDOSCOPY | Facility: HOSPITAL | Age: 77
End: 2022-08-05
Payer: COMMERCIAL

## 2022-08-05 ENCOUNTER — PATIENT MESSAGE (OUTPATIENT)
Dept: ENDOSCOPY | Facility: HOSPITAL | Age: 77
End: 2022-08-05
Payer: COMMERCIAL

## 2022-08-05 ENCOUNTER — ANESTHESIA EVENT (OUTPATIENT)
Dept: ENDOSCOPY | Facility: HOSPITAL | Age: 77
End: 2022-08-05

## 2022-08-05 VITALS
DIASTOLIC BLOOD PRESSURE: 64 MMHG | HEIGHT: 67 IN | RESPIRATION RATE: 16 BRPM | OXYGEN SATURATION: 96 % | TEMPERATURE: 98 F | HEART RATE: 65 BPM | WEIGHT: 160 LBS | SYSTOLIC BLOOD PRESSURE: 116 MMHG | BODY MASS INDEX: 25.11 KG/M2

## 2022-08-05 DIAGNOSIS — R13.10 DYSPHAGIA: ICD-10-CM

## 2022-08-05 PROCEDURE — 43249 ESOPH EGD DILATION <30 MM: CPT | Mod: ,,, | Performed by: INTERNAL MEDICINE

## 2022-08-05 PROCEDURE — 43249 ESOPH EGD DILATION <30 MM: CPT | Performed by: INTERNAL MEDICINE

## 2022-08-05 PROCEDURE — 88305 TISSUE EXAM BY PATHOLOGIST: CPT | Performed by: STUDENT IN AN ORGANIZED HEALTH CARE EDUCATION/TRAINING PROGRAM

## 2022-08-05 PROCEDURE — C1726 CATH, BAL DIL, NON-VASCULAR: HCPCS | Performed by: INTERNAL MEDICINE

## 2022-08-05 PROCEDURE — 88342 IMHCHEM/IMCYTCHM 1ST ANTB: CPT | Mod: 26,,, | Performed by: STUDENT IN AN ORGANIZED HEALTH CARE EDUCATION/TRAINING PROGRAM

## 2022-08-05 PROCEDURE — 43239 EGD BIOPSY SINGLE/MULTIPLE: CPT | Mod: 59 | Performed by: INTERNAL MEDICINE

## 2022-08-05 PROCEDURE — 43249 PR EGD, FLEX, W/BALL DILATION, < 30MM: ICD-10-PCS | Mod: ,,, | Performed by: INTERNAL MEDICINE

## 2022-08-05 PROCEDURE — 37000008 HC ANESTHESIA 1ST 15 MINUTES: Performed by: INTERNAL MEDICINE

## 2022-08-05 PROCEDURE — 37000009 HC ANESTHESIA EA ADD 15 MINS: Performed by: INTERNAL MEDICINE

## 2022-08-05 PROCEDURE — 43239 PR EGD, FLEX, W/BIOPSY, SGL/MULTI: ICD-10-PCS | Mod: 59,,, | Performed by: INTERNAL MEDICINE

## 2022-08-05 PROCEDURE — 88342 IMHCHEM/IMCYTCHM 1ST ANTB: CPT | Performed by: STUDENT IN AN ORGANIZED HEALTH CARE EDUCATION/TRAINING PROGRAM

## 2022-08-05 PROCEDURE — 25000003 PHARM REV CODE 250: Performed by: INTERNAL MEDICINE

## 2022-08-05 PROCEDURE — 63600175 PHARM REV CODE 636 W HCPCS: Performed by: NURSE ANESTHETIST, CERTIFIED REGISTERED

## 2022-08-05 PROCEDURE — 45380 PR COLONOSCOPY,BIOPSY: ICD-10-PCS | Mod: ,,, | Performed by: INTERNAL MEDICINE

## 2022-08-05 PROCEDURE — 88305 TISSUE EXAM BY PATHOLOGIST: CPT | Mod: 26,,, | Performed by: STUDENT IN AN ORGANIZED HEALTH CARE EDUCATION/TRAINING PROGRAM

## 2022-08-05 PROCEDURE — 45380 COLONOSCOPY AND BIOPSY: CPT | Performed by: INTERNAL MEDICINE

## 2022-08-05 PROCEDURE — 45380 COLONOSCOPY AND BIOPSY: CPT | Mod: ,,, | Performed by: INTERNAL MEDICINE

## 2022-08-05 PROCEDURE — 88342 CHG IMMUNOCYTOCHEMISTRY: ICD-10-PCS | Mod: 26,,, | Performed by: STUDENT IN AN ORGANIZED HEALTH CARE EDUCATION/TRAINING PROGRAM

## 2022-08-05 PROCEDURE — 43239 EGD BIOPSY SINGLE/MULTIPLE: CPT | Mod: 59,,, | Performed by: INTERNAL MEDICINE

## 2022-08-05 PROCEDURE — 27201012 HC FORCEPS, HOT/COLD, DISP: Performed by: INTERNAL MEDICINE

## 2022-08-05 PROCEDURE — 88305 TISSUE EXAM BY PATHOLOGIST: ICD-10-PCS | Mod: 26,,, | Performed by: STUDENT IN AN ORGANIZED HEALTH CARE EDUCATION/TRAINING PROGRAM

## 2022-08-05 PROCEDURE — E9220 PRA ENDO ANESTHESIA: HCPCS | Mod: ,,, | Performed by: NURSE ANESTHETIST, CERTIFIED REGISTERED

## 2022-08-05 PROCEDURE — 25000003 PHARM REV CODE 250: Performed by: NURSE ANESTHETIST, CERTIFIED REGISTERED

## 2022-08-05 PROCEDURE — E9220 PRA ENDO ANESTHESIA: ICD-10-PCS | Mod: ,,, | Performed by: NURSE ANESTHETIST, CERTIFIED REGISTERED

## 2022-08-05 RX ORDER — PROPOFOL 10 MG/ML
VIAL (ML) INTRAVENOUS
Status: DISCONTINUED | OUTPATIENT
Start: 2022-08-05 | End: 2022-08-05

## 2022-08-05 RX ORDER — PROPOFOL 10 MG/ML
VIAL (ML) INTRAVENOUS CONTINUOUS PRN
Status: DISCONTINUED | OUTPATIENT
Start: 2022-08-05 | End: 2022-08-05

## 2022-08-05 RX ORDER — LIDOCAINE HYDROCHLORIDE 10 MG/ML
INJECTION, SOLUTION INTRAVENOUS
Status: DISCONTINUED | OUTPATIENT
Start: 2022-08-05 | End: 2022-08-05

## 2022-08-05 RX ORDER — SODIUM CHLORIDE 9 MG/ML
INJECTION, SOLUTION INTRAVENOUS CONTINUOUS
Status: DISCONTINUED | OUTPATIENT
Start: 2022-08-05 | End: 2022-08-05 | Stop reason: HOSPADM

## 2022-08-05 RX ADMIN — LIDOCAINE HYDROCHLORIDE 50 MG: 10 INJECTION, SOLUTION INTRAVENOUS at 09:08

## 2022-08-05 RX ADMIN — SODIUM CHLORIDE: 0.9 INJECTION, SOLUTION INTRAVENOUS at 09:08

## 2022-08-05 RX ADMIN — PROPOFOL 60 MG: 10 INJECTION, EMULSION INTRAVENOUS at 09:08

## 2022-08-05 RX ADMIN — Medication 250 MCG/KG/MIN: at 09:08

## 2022-08-05 NOTE — PLAN OF CARE
Procedure completed. Pt to be discharged with spouse. In no acute distress. Discharge instructions given. Verbalizes understanding of post procedure restrictions and instructions.

## 2022-08-05 NOTE — [employer]ATION PATIENT INSTRUCTIONS
Discharge Summary/Instructions after an Endoscopic Procedure  Patient Name: Alejandro Wayne  Patient MRN: [id number]  Patient YOB: 1945  Friday, August 5, 2022  Aidan Castro MD  Dear patient,  As a result of recent federal legislation (The Federal Cures Act), you may   receive lab or pathology results from your procedure in your [facility]   account before your physician is able to contact you. Your physician or   their representative will relay the results to you with their   recommendations at their soonest availability.  Thank you,  RESTRICTIONS:  During your procedure today, you received medications for sedation.  These   medications may affect your judgment, balance and coordination.  Therefore,   for 24 hours, you have the following restrictions:   - DO NOT drive a car, operate machinery, make legal/financial decisions,   sign important papers or drink alcohol.    ACTIVITY:  Today: no heavy lifting, straining or running due to procedural   sedation/anesthesia.  The following day: return to full activity including work.  DIET:  Eat and drink normally unless instructed otherwise.     TREATMENT FOR COMMON SIDE EFFECTS:  - Mild abdominal pain, nausea, belching, bloating or excessive gas:  rest,   eat lightly and use a heating pad.  - Sore Throat: treat with throat lozenges and/or gargle with warm salt   water.  - Because air was used during the procedure, expelling large amounts of air   from your rectum or belching is normal.  - If a bowel prep was taken, you may not have a bowel movement for 1-3 days.    This is normal.  SYMPTOMS TO WATCH FOR AND REPORT TO YOUR PHYSICIAN:  1. Abdominal pain or bloating, other than gas cramps.  2. Chest pain.  3. Back pain.  4. Signs of infection such as: chills or fever occurring within 24 hours   after the procedure.  5. Rectal bleeding, which would show as bright red, maroon, or black stools.   (A tablespoon of blood from the rectum is not serious, especially if    hemorrhoids are present.)  6. Vomiting.  7. Weakness or dizziness.  GO DIRECTLY TO THE NEAREST EMERGENCY ROOM IF YOU HAVE ANY OF THE FOLLOWING:      Difficulty breathing              Chills and/or fever over 101 F   Persistent vomiting and/or vomiting blood   Severe abdominal pain   Severe chest pain   Black, tarry stools   Bleeding- more than one tablespoon   Any other symptom or condition that you feel may need urgent attention  Your doctor recommends these additional instructions:  If any biopsies were taken, your doctors clinic will contact you in 1 to 2   weeks with any results.  - Discharge patient to home.   - Resume previous diet today.   - Perform a colonoscopy today.   - Continue present medications.   - Await pathology results.   - There was no significant stenosis noted. Suspect some of the symptoms may   be related to presbyesophagus.  For questions, problems or results please call your physician - Aidan Castro MD at Work:  [phone].  [facility], EMERGENCY ROOM PHONE NUMBER: [phone]  IF A COMPLICATION OR EMERGENCY SITUATION ARISES AND YOU ARE UNABLE TO REACH   YOUR PHYSICIAN - GO DIRECTLY TO THE EMERGENCY ROOM.  Aidan Castro MD  8/5/2022 9:45:58 AM  This report has been verified and signed electronically.  Dear patient,  As a result of recent federal legislation (The Federal Cures Act), you may   receive lab or pathology results from your procedure in your [facility]   account before your physician is able to contact you. Your physician or   their representative will relay the results to you with their   recommendations at their soonest availability.  Thank you,  PROVATION

## 2022-08-05 NOTE — H&P
Short Stay Endoscopy History and Physical    PCP - Edward Sheppard MD    Procedure - EGD and colonoscopy  ASA - 2  Mallampati - per anesthesia  History of Anesthesia problems - no  Family history Anesthesia problems -  no     HPI:  This is a 77 y.o. male here for evaluation of :     EGD  Reflux - no  Dysphagia - yes  Abdominal pain - no  Diarrhea - no  Anemia - no  GI bleeding - no  Other - no    Colonoscopy  Screening - no  History of polyps - no  Diarrhea - no  Anemia - no  Blood in stools - no  Abdominal pain - no  Other - Crohn's colitis    ROS:  CONSTITUTIONAL: Denies weight change,  fatigue, fevers, chills, night sweats.  CARDIOVASCULAR: Denies chest pain, shortness of breath, orthopnea and edema.  RESPIRATORY: Denies cough, hemoptysis, dyspnea, and wheezing.  GI: See HPI.    Medical History:   Past Medical History:   Diagnosis Date    Basal cell carcinoma     BPH (benign prostatic hyperplasia)     Chronic nonallergic rhinitis 5/28/2020    Colon polyp     Crohn's disease     Hyperlipidemia     Hypertension     Liver cyst 1/11/2016    Prostate nodule     Squamous cell carcinoma     Thyroid nodule 5/28/2020       Surgical History:   Past Surgical History:   Procedure Laterality Date    APPENDECTOMY      COLONOSCOPY N/A 6/22/2016    Procedure: COLONOSCOPY;  Surgeon: Marco Mathews MD;  Location: [address]);  Service: Endoscopy;  Laterality: N/A;    COLONOSCOPY N/A 10/21/2019    Procedure: COLONOSCOPY;  Surgeon: Marco Mathews MD;  Location: [address]);  Service: Endoscopy;  Laterality: N/A;  First case of the day with me next available     COLONOSCOPY N/A 10/5/2020    Procedure: COLONOSCOPY;  Surgeon: LIZABETH Hall MD;  Location: [address]);  Service: Endoscopy;  Laterality: N/A;    COLONOSCOPY W/ POLYPECTOMY      ENDOSCOPIC ULTRASOUND OF UPPER GASTROINTESTINAL TRACT N/A 6/24/2019    Procedure: ULTRASOUND, UPPER GI TRACT, ENDOSCOPIC;  Surgeon: Jeremy BALLARD  MD Stiven;  Location: [facility] (2ND FLR);  Service: Endoscopy;  Laterality: N/A;  For evaluation of low fecal elastase and loose stools and history of nonspecific finding of the EUS of the pancreas    ESOPHAGOGASTRODUODENOSCOPY      ESOPHAGOGASTRODUODENOSCOPY N/A 2019    Procedure: EGD (ESOPHAGOGASTRODUODENOSCOPY);  Surgeon: Jeremy Saleem MD;  Location: [facility] ENDO (2ND FLR);  Service: Endoscopy;  Laterality: N/A;  For evaluation of iron deficiency anemia    ESOPHAGOGASTRODUODENOSCOPY N/A 10/21/2019    Procedure: EGD (ESOPHAGOGASTRODUODENOSCOPY);  Surgeon: Marco Mathews MD;  Location: [facility] ENDO (4TH FLR);  Service: Endoscopy;  Laterality: N/A;  First case of the day with me next available     HERNIA REPAIR      KNEE ARTHROSCOPY W/ DEBRIDEMENT      TONSILLECTOMY, ADENOIDECTOMY      UPPER ENDOSCOPIC ULTRASOUND W/ FNA      VASECTOMY         Family History:   Family History   Problem Relation Age of Onset    Cancer Mother         melanoma    Cancer Father         skin cancer    Celiac disease Neg Hx     Cirrhosis Neg Hx     Colon cancer Neg Hx     Colon polyps Neg Hx     Crohn's disease Neg Hx     Esophageal cancer Neg Hx     Inflammatory bowel disease Neg Hx     Irritable bowel syndrome Neg Hx     Liver cancer Neg Hx     Rectal cancer Neg Hx     Stomach cancer Neg Hx     Ulcerative colitis Neg Hx        Social History:   Social History     Tobacco Use    Smoking status: Former Smoker     Packs/day: 0.50     Years: 4.00     Pack years: 2.00     Types: Cigarettes     Quit date: 1966     Years since quittin.2    Smokeless tobacco: Never Used    Tobacco comment: as teenager   Substance Use Topics    Alcohol use: Yes     Alcohol/week: 14.0 standard drinks     Types: 14 Glasses of wine per week     Comment: social    Drug use: No       Allergies: Reviewed    Medications:   No current facility-administered medications on file prior to encounter.     Current Outpatient Medications  on File Prior to Encounter   Medication Sig Dispense Refill    amLODIPine (NORVASC) 5 MG tablet TAKE 1 TABLET BY MOUTH EVERY DAY 90 tablet 4    ascorbic acid, vitamin C, (VITAMIN C) 1000 MG tablet Take 1,000 mg by mouth once daily.      aspirin (ECOTRIN) 81 MG EC tablet Take 81 mg by mouth once daily.      cetirizine (ZYRTEC) 10 MG tablet Take 10 mg by mouth once daily.      CREON 36,000-114,000- 180,000 unit CpDR TAKE 3 CAPSULES BY MOUTH THREE TIMES DAILY WITH MEALS. TAKE 1 CAPSULE BY MOUTH WITH EVERY SNACK. DO NOT EXCEED 12 CAPSULE DAILY AS DIRECTED 810 capsule 3    mirabegron (MYRBETRIQ) 25 mg Tb24 ER tablet Take 1 tablet (25 mg total) by mouth once daily. 30 tablet 11    multivitamin (THERAGRAN) per tablet Take 1 tablet by mouth once daily.      tadalafiL (CIALIS) 20 MG Tab Take 1 tablet (20 mg total) by mouth daily as needed (as needed). 10 tablet 11    clindamycin (CLEOCIN T) 1 % Swab APPLY EXTERNALLY TO THE AFFECTED AREA TWICE DAILY TO BIOPSY SITE      fluorouracil (EFUDEX) 5 % cream       hydrocortisone 2.5 % cream APPLY EXTERNALLY TO THE AFFECTED AREA TO FACE TWICE DAILY FOR 5 TO 7 DAYS AS DIRECTED      triamcinolone acetonide 0.1% (KENALOG) 0.1 % cream APPLY EXTERNALLY TO THE AFFECTED AREA ON ARMS TWICE DAILY FOR 10 TO 14 DAYS AS DIRECTED         Physical Exam:  Vital Signs:   Vitals:    08/05/22 0859   BP: 136/73   Pulse: 74   Resp: 14   Temp: 98.1 °F (36.7 °C)     General Appearance: Well appearing in no acute distress  ENT: OP clear  Chest: CTA B  CV: RRR, no m/r/g  Abd: s/nt/nd/nabs  Ext: no edema    Labs:Reviewed    Plan:   I have explained the risks and benefits of upper endoscopy and colonoscopy to the patient including but not limited to bleeding, perforation, infection, and death. The patient wishes to proceed.

## 2022-08-05 NOTE — [employer] PATIENT INSTRUCTIONS
Discharge Summary/Instructions after an Endoscopic Procedure  Patient Name: Alejandro Wayne  Patient MRN: [id number]  Patient YOB: 1945  Friday, August 5, 2022  Aidan Castro MD  Dear patient,  As a result of recent federal legislation (The Federal Cures Act), you may   receive lab or pathology results from your procedure in your [facility]   account before your physician is able to contact you. Your physician or   their representative will relay the results to you with their   recommendations at their soonest availability.  Thank you,  RESTRICTIONS:  During your procedure today, you received medications for sedation.  These   medications may affect your judgment, balance and coordination.  Therefore,   for 24 hours, you have the following restrictions:   - DO NOT drive a car, operate machinery, make legal/financial decisions,   sign important papers or drink alcohol.    ACTIVITY:  Today: no heavy lifting, straining or running due to procedural   sedation/anesthesia.  The following day: return to full activity including work.  DIET:  Eat and drink normally unless instructed otherwise.     TREATMENT FOR COMMON SIDE EFFECTS:  - Mild abdominal pain, nausea, belching, bloating or excessive gas:  rest,   eat lightly and use a heating pad.  - Sore Throat: treat with throat lozenges and/or gargle with warm salt   water.  - Because air was used during the procedure, expelling large amounts of air   from your rectum or belching is normal.  - If a bowel prep was taken, you may not have a bowel movement for 1-3 days.    This is normal.  SYMPTOMS TO WATCH FOR AND REPORT TO YOUR PHYSICIAN:  1. Abdominal pain or bloating, other than gas cramps.  2. Chest pain.  3. Back pain.  4. Signs of infection such as: chills or fever occurring within 24 hours   after the procedure.  5. Rectal bleeding, which would show as bright red, maroon, or black stools.   (A tablespoon of blood from the rectum is not serious, especially if    hemorrhoids are present.)  6. Vomiting.  7. Weakness or dizziness.  GO DIRECTLY TO THE NEAREST EMERGENCY ROOM IF YOU HAVE ANY OF THE FOLLOWING:      Difficulty breathing              Chills and/or fever over 101 F   Persistent vomiting and/or vomiting blood   Severe abdominal pain   Severe chest pain   Black, tarry stools   Bleeding- more than one tablespoon   Any other symptom or condition that you feel may need urgent attention  Your doctor recommends these additional instructions:  If any biopsies were taken, your doctors clinic will contact you in 1 to 2   weeks with any results.  - Discharge patient to home.   - High fiber diet indefinitely.   - Continue present medications.   - Await pathology results.   - Return to my office as previously scheduled.   - The findings on today's exam are more consistent with segmental colitis   associated with diverticulosis (SCAD) than with Crohn's disease. His   previous procedure was notable for rectal inflammation and there is an anal   stenosis that would be atypical of SCAD. I suspect that he has mild colonic   Crohn's disease that appears under good control with his current medication   (Lialda 4.8g daily) as well as concurrent SCAD.  - Patient has a contact number available for emergencies.  The signs and   symptoms of potential delayed complications were discussed with the   patient.  Return to normal activities tomorrow.  Written discharge   instructions were provided to the patient.   - Repeat colonoscopy (date not yet determined) for surveillance based on   pathology results.  For questions, problems or results please call your physician - Aidan Castro MD at Work:  [phone].  [facility] [place], EMERGENCY ROOM PHONE NUMBER: [phone]  IF A COMPLICATION OR EMERGENCY SITUATION ARISES AND YOU ARE UNABLE TO REACH   YOUR PHYSICIAN - GO DIRECTLY TO THE EMERGENCY ROOM.  Aidan Castro MD  8/5/2022 10:17:02 AM  This report has been verified and  signed electronically.  Dear patient,  As a result of recent federal legislation (The Federal Cures Act), you may   receive lab or pathology results from your procedure in your [facility]   account before your physician is able to contact you. Your physician or   their representative will relay the results to you with their   recommendations at their soonest availability.  Thank you,  [employer]ATION

## 2022-08-05 NOTE — ANESTHESIA POSTPROCEDURE EVALUATION
Anesthesia Post Evaluation    Patient: Alejandro Wayne    Procedure(s) Performed: Procedure(s) (LRB):  ESOPHAGOGASTRODUODENOSCOPY (EGD) (N/A)  COLONOSCOPY (N/A)    Final Anesthesia Type: general      Patient location during evaluation: GI PACU  Patient participation: Yes- Able to Participate  Level of consciousness: awake and alert and oriented  Post-procedure vital signs: reviewed and stable  Pain management: adequate  Airway patency: patent    PONV status at discharge: No PONV  Anesthetic complications: no      Cardiovascular status: blood pressure returned to baseline  Respiratory status: unassisted, spontaneous ventilation and room air  Hydration status: euvolemic  Follow-up not needed.          Vitals Value Taken Time   /64 08/05/22 1043   Temp 36.6 °C (97.9 °F) 08/05/22 1016   Pulse 65 08/05/22 1043   Resp 16 08/05/22 1043   SpO2 96 % 08/05/22 1043         Event Time   Out of Recovery 10:45:14         Pain/Chetan Score: Chetan Score: 10 (8/5/2022 10:44 AM)

## 2022-08-05 NOTE — ANESTHESIA PREPROCEDURE EVALUATION
08/05/2022  Alejandro Wayne is a 77 y.o., male.  Past Medical History:   Diagnosis Date    Basal cell carcinoma     BPH (benign prostatic hyperplasia)     Chronic nonallergic rhinitis 5/28/2020    Colon polyp     Crohn's disease     Hyperlipidemia     Hypertension     Liver cyst 1/11/2016    Prostate nodule     Squamous cell carcinoma     Thyroid nodule 5/28/2020       Past Surgical History:   Procedure Laterality Date    APPENDECTOMY      COLONOSCOPY N/A 6/22/2016    Procedure: COLONOSCOPY;  Surgeon: Marco Mathews MD;  Location: [facility] ENDO (4TH FLR);  Service: Endoscopy;  Laterality: N/A;    COLONOSCOPY N/A 10/21/2019    Procedure: COLONOSCOPY;  Surgeon: Marco Mathews MD;  Location: [facility] ENDO (4TH FLR);  Service: Endoscopy;  Laterality: N/A;  First case of the day with me next available     COLONOSCOPY N/A 10/5/2020    Procedure: COLONOSCOPY;  Surgeon: LIZABETH Hall MD;  Location: [facility] ENDO (4TH FLR);  Service: Endoscopy;  Laterality: N/A;    COLONOSCOPY W/ POLYPECTOMY      ENDOSCOPIC ULTRASOUND OF UPPER GASTROINTESTINAL TRACT N/A 6/24/2019    Procedure: ULTRASOUND, UPPER GI TRACT, ENDOSCOPIC;  Surgeon: Jeremy Saleem MD;  Location: [facility] ENDO (2ND FLR);  Service: Endoscopy;  Laterality: N/A;  For evaluation of low fecal elastase and loose stools and history of nonspecific finding of the EUS of the pancreas    ESOPHAGOGASTRODUODENOSCOPY      ESOPHAGOGASTRODUODENOSCOPY N/A 6/24/2019    Procedure: EGD (ESOPHAGOGASTRODUODENOSCOPY);  Surgeon: Jeremy Saleem MD;  Location: [facility] ENDO (2ND FLR);  Service: Endoscopy;  Laterality: N/A;  For evaluation of iron deficiency anemia    ESOPHAGOGASTRODUODENOSCOPY N/A 10/21/2019    Procedure: EGD (ESOPHAGOGASTRODUODENOSCOPY);  Surgeon: Marco Mathews MD;  Location: [facility] ENDO (4TH FLR);  Service: Endoscopy;  Laterality: N/A;  First case of the  day with me next available     HERNIA REPAIR      KNEE ARTHROSCOPY W/ DEBRIDEMENT      TONSILLECTOMY, ADENOIDECTOMY      UPPER ENDOSCOPIC ULTRASOUND W/ FNA      VASECTOMY         Family History   Problem Relation Age of Onset    Cancer Mother         melanoma    Cancer Father         skin cancer    Celiac disease Neg Hx     Cirrhosis Neg Hx     Colon cancer Neg Hx     Colon polyps Neg Hx     Crohn's disease Neg Hx     Esophageal cancer Neg Hx     Inflammatory bowel disease Neg Hx     Irritable bowel syndrome Neg Hx     Liver cancer Neg Hx     Rectal cancer Neg Hx     Stomach cancer Neg Hx     Ulcerative colitis Neg Hx        Social History     Socioeconomic History    Marital status:    Tobacco Use    Smoking status: Former Smoker     Packs/day: 0.50     Years: 4.00     Pack years: 2.00     Types: Cigarettes     Quit date: 1966     Years since quittin.2    Smokeless tobacco: Never Used    Tobacco comment: as teenager   Substance and Sexual Activity    Alcohol use: Yes     Alcohol/week: 14.0 standard drinks     Types: 14 Glasses of wine per week     Comment: social    Drug use: No    Sexual activity: Yes     Partners: Female       Current Facility-Administered Medications   Medication Dose Route Frequency Provider Last Rate Last Admin    0.9%  NaCl infusion   Intravenous Continuous Aidan Castro MD           Review of patient's allergies indicates:  No Known Allergies        Pre-op Assessment    I have reviewed the Patient Summary Reports.     I have reviewed the Nursing Notes. I have reviewed the NPO Status.   I have reviewed the Medications.     Review of Systems  Anesthesia Hx:  No problems with previous Anesthesia Denies Hx of Anesthetic complications  History of prior surgery of interest to airway management or planning: Previous anesthesia: General Denies Family Hx of Anesthesia complications.   Denies Personal Hx of Anesthesia complications.    Hematology/Oncology:  Hematology Normal   Oncology Normal     EENT/Dental:   chronic allergic rhinitis   Cardiovascular:   Exercise tolerance: good Hypertension ECG has been reviewed.  Functional Capacity good / => 4 METS    Pulmonary:  Pulmonary Normal    Renal/:  Renal/ Normal     Hepatic/GI:  Hepatic/GI Normal Bowel Prep. Liver cyst  Dysphagia  Crohn's disease   Musculoskeletal:  Musculoskeletal Normal    Neurological:  Neurology Normal    Endocrine:  Endocrine Normal    Dermatological:  Skin Normal    Psych:  Psychiatric Normal           Physical Exam  General: Well nourished, Cooperative, Alert and Oriented    Airway:  Mallampati: II / I  Mouth Opening: Normal  TM Distance: Normal  Tongue: Normal  Neck ROM: Normal ROM    Dental:  Intact        Anesthesia Plan  Type of Anesthesia, risks & benefits discussed:    Anesthesia Type: Gen Natural Airway  Intra-op Monitoring Plan: Standard ASA Monitors  Post Op Pain Control Plan: IV/PO Opioids PRN  Induction:  IV  Informed Consent: Informed consent signed with the Patient and all parties understand the risks and agree with anesthesia plan.  All questions answered.   ASA Score: 2  Day of Surgery Review of History & Physical: H&P Update referred to the surgeon/provider.    Ready For Surgery From Anesthesia Perspective.     .

## 2022-08-10 ENCOUNTER — TELEPHONE (OUTPATIENT)
Dept: GASTROENTEROLOGY | Facility: CLINIC | Age: 77
End: 2022-08-10
Payer: COMMERCIAL

## 2022-08-10 ENCOUNTER — HOSPITAL ENCOUNTER (OUTPATIENT)
Dept: RADIOLOGY | Facility: OTHER | Age: 77
Discharge: HOME | End: 2022-08-10
Attending: UROLOGY
Payer: COMMERCIAL

## 2022-08-10 DIAGNOSIS — N39.42 URINARY INCONTINENCE WITHOUT SENSORY AWARENESS: ICD-10-CM

## 2022-08-10 PROCEDURE — 74178 CT ABD&PLV WO CNTR FLWD CNTR: CPT | Mod: TC

## 2022-08-10 PROCEDURE — 74178 CT ABD&PLV WO CNTR FLWD CNTR: CPT | Mod: 26,,, | Performed by: RADIOLOGY

## 2022-08-10 PROCEDURE — 74178 CT UROGRAM ABD PELVIS W WO: ICD-10-PCS | Mod: 26,,, | Performed by: RADIOLOGY

## 2022-08-10 PROCEDURE — 25500020 PHARM REV CODE 255: Performed by: UROLOGY

## 2022-08-10 RX ADMIN — IOHEXOL 125 ML: 350 INJECTION, SOLUTION INTRAVENOUS at 01:08

## 2022-08-11 ENCOUNTER — PATIENT MESSAGE (OUTPATIENT)
Dept: UROLOGY | Facility: CLINIC | Age: 77
End: 2022-08-11
Payer: COMMERCIAL

## 2022-08-11 LAB
FINAL PATHOLOGIC DIAGNOSIS: NORMAL
GROSS: NORMAL
Lab: NORMAL
MICROSCOPIC EXAM: NORMAL

## 2022-10-27 DIAGNOSIS — K86.81 EXOCRINE PANCREATIC INSUFFICIENCY: ICD-10-CM

## 2022-10-27 RX ORDER — PANCRELIPASE 36000; 180000; 114000 [USP'U]/1; [USP'U]/1; [USP'U]/1
CAPSULE, DELAYED RELEASE PELLETS ORAL
Qty: 810 CAPSULE | Refills: 3 | Status: SHIPPED | OUTPATIENT
Start: 2022-10-27 | End: 2022-11-28

## 2022-11-28 ENCOUNTER — OFFICE VISIT (OUTPATIENT)
Dept: GASTROENTEROLOGY | Facility: CLINIC | Age: 77
End: 2022-11-28
Payer: COMMERCIAL

## 2022-11-28 VITALS
WEIGHT: 170.13 LBS | HEIGHT: 67 IN | BODY MASS INDEX: 26.7 KG/M2 | HEART RATE: 61 BPM | TEMPERATURE: 98 F | DIASTOLIC BLOOD PRESSURE: 86 MMHG | SYSTOLIC BLOOD PRESSURE: 133 MMHG

## 2022-11-28 DIAGNOSIS — K50.10 CROHN'S DISEASE OF COLON WITHOUT COMPLICATION: Primary | ICD-10-CM

## 2022-11-28 DIAGNOSIS — K50.10 SEGMENTAL COLITIS ASSOCIATED WITH DIVERTICULOSIS: ICD-10-CM

## 2022-11-28 DIAGNOSIS — K86.89 PANCREATIC INSUFFICIENCY: ICD-10-CM

## 2022-11-28 DIAGNOSIS — K57.30 SEGMENTAL COLITIS ASSOCIATED WITH DIVERTICULOSIS: ICD-10-CM

## 2022-11-28 PROBLEM — K62.89 PROCTITIS: Status: RESOLVED | Noted: 2020-10-05 | Resolved: 2022-11-28

## 2022-11-28 PROCEDURE — 1126F AMNT PAIN NOTED NONE PRSNT: CPT | Mod: CPTII,S$GLB,, | Performed by: INTERNAL MEDICINE

## 2022-11-28 PROCEDURE — 99214 PR OFFICE/OUTPT VISIT, EST, LEVL IV, 30-39 MIN: ICD-10-PCS | Mod: S$GLB,,, | Performed by: INTERNAL MEDICINE

## 2022-11-28 PROCEDURE — 1101F PT FALLS ASSESS-DOCD LE1/YR: CPT | Mod: CPTII,S$GLB,, | Performed by: INTERNAL MEDICINE

## 2022-11-28 PROCEDURE — 1159F MED LIST DOCD IN RCRD: CPT | Mod: CPTII,S$GLB,, | Performed by: INTERNAL MEDICINE

## 2022-11-28 PROCEDURE — 3079F DIAST BP 80-89 MM HG: CPT | Mod: CPTII,S$GLB,, | Performed by: INTERNAL MEDICINE

## 2022-11-28 PROCEDURE — 3079F PR MOST RECENT DIASTOLIC BLOOD PRESSURE 80-89 MM HG: ICD-10-PCS | Mod: CPTII,S$GLB,, | Performed by: INTERNAL MEDICINE

## 2022-11-28 PROCEDURE — 1160F RVW MEDS BY RX/DR IN RCRD: CPT | Mod: CPTII,S$GLB,, | Performed by: INTERNAL MEDICINE

## 2022-11-28 PROCEDURE — 1126F PR PAIN SEVERITY QUANTIFIED, NO PAIN PRESENT: ICD-10-PCS | Mod: CPTII,S$GLB,, | Performed by: INTERNAL MEDICINE

## 2022-11-28 PROCEDURE — 3075F SYST BP GE 130 - 139MM HG: CPT | Mod: CPTII,S$GLB,, | Performed by: INTERNAL MEDICINE

## 2022-11-28 PROCEDURE — 1160F PR REVIEW ALL MEDS BY PRESCRIBER/CLIN PHARMACIST DOCUMENTED: ICD-10-PCS | Mod: CPTII,S$GLB,, | Performed by: INTERNAL MEDICINE

## 2022-11-28 PROCEDURE — 3288F FALL RISK ASSESSMENT DOCD: CPT | Mod: CPTII,S$GLB,, | Performed by: INTERNAL MEDICINE

## 2022-11-28 PROCEDURE — 3075F PR MOST RECENT SYSTOLIC BLOOD PRESS GE 130-139MM HG: ICD-10-PCS | Mod: CPTII,S$GLB,, | Performed by: INTERNAL MEDICINE

## 2022-11-28 PROCEDURE — 1159F PR MEDICATION LIST DOCUMENTED IN MEDICAL RECORD: ICD-10-PCS | Mod: CPTII,S$GLB,, | Performed by: INTERNAL MEDICINE

## 2022-11-28 PROCEDURE — 3288F PR FALLS RISK ASSESSMENT DOCUMENTED: ICD-10-PCS | Mod: CPTII,S$GLB,, | Performed by: INTERNAL MEDICINE

## 2022-11-28 PROCEDURE — 99214 OFFICE O/P EST MOD 30 MIN: CPT | Mod: S$GLB,,, | Performed by: INTERNAL MEDICINE

## 2022-11-28 PROCEDURE — 1101F PR PT FALLS ASSESS DOC 0-1 FALLS W/OUT INJ PAST YR: ICD-10-PCS | Mod: CPTII,S$GLB,, | Performed by: INTERNAL MEDICINE

## 2022-11-28 RX ORDER — MULTIVITAMIN
1 TABLET ORAL DAILY
COMMUNITY

## 2022-11-28 NOTE — PROGRESS NOTES
[facility]          Inflammatory Bowel Disease Follow Up Note         TODAY'S VISIT DATE:  11/28/2022    Reason for Consult:    Chief Complaint   Patient presents with    Crohn's Disease       PCP: Edward Sheppard      Referring MD:   No ref. provider found    History of Present Illness:  Alejandro Wayne who is a 77 y.o. male is being seen today at the [facility] on 11/28/2022 for inflammatory bowel disease- Crohn's disease.  He continues to do very well.  He has been taking Lialda 4.8 g daily.  He continues to take Creon for pancreatic insufficiency.  He reports that his bowel movements have been normal.  He has some minor urgency periodically but otherwise his stools are normal.  In August he underwent a follow-up colonoscopy that showed resolution of his rectal inflammation and improvement in the sigmoid and descending inflammation but still some ongoing inflammatory changes in the descending and sigmoid.  The appearance was more consistent with segmental colitis associated with diverticular disease.    IBD History:  He has had several years of intermittent gastrointestinal problems.  He was initially seen by Dr. Mathews in November of 2015 with complaints of chronic diarrhea.  At that time he was found to have a low fecal elastase level consistent with pancreatic insufficiency.  Endoscopic ultrasound was performed and found changes consistent with mild chronic pancreatitis.  He has a history of chronic alcohol use.  A CT scan was also performed around that time which showed changes consistent with diverticulitis.  In June of 2016 he underwent a screening colonoscopy which revealed a normal terminal ileum, multiple diverticula in the sigmoid, descending, transverse, and ascending colon.  There was also some moderately erythematous mucosa in the mid sigmoid and distal sigmoid colon and biopsies were taken which did not show any significant  features.  In October 2018 he had a repeat CT scan done because of abdominal pain and he was again found to have multiple diverticula as well as some luminal narrowing and wall thickening and changes consistent with sigmoid diverticulitis.  In October 2019 he underwent an upper endoscopy and colonoscopy because of iron deficiency anemia and diarrhea.  The upper endoscopy revealed a small hiatal hernia but was otherwise normal.  Colonoscopy revealed a normal terminal ileum, multiple diverticula, moderately erythematous mucosa in the sigmoid colon with purulent exudate consistent with acute diverticulitis.  A CT scan again revealed changes with diverticulitis.  In November 2019 he was seen in follow-up by Colorectal surgery and a partial colectomy was recommended because of recurrent diverticulitis.  This was delayed due to family medical issues.  In May of this year he presented to Colorectal surgery Clinic again with complaints of loose stools.  A repeat CT scan showed finding similar to all of his previous CT scans.  He underwent colonoscopy on October 5, 2020 because of ongoing diarrhea issues and was found to have inflamed ulcerated mucosa in the anus and rectum, sigmoid colon, distal descending colon.  The more proximal colon appeared normal.  There was also a mild anal stricture.  Biopsies from this colonoscopy revealed a normal terminal ileum.  Biopsies of the right colon revealed chronic and superficial acute colitis.  Left colon biopsies revealed chronic colitis as well as at least 1 rectal biopsy revealing an intramucosal granuloma.  He was started on Lialda in November 2020. He had a significant improvement after starting Lialda but at his 1st follow-up he was noted only be taking 1.2 g daily.  We increase the dose at that time to 2.4 g daily.  In March 2021 he had a continued elevation of his fecal calprotectin level so the dose was increased to 4.8 g daily.  His CRP had normalized.  A repeat stool  calprotectin level in July 2021 showed a significant decline in the stool calprotectin.  Follow-up colonoscopy in August 2022 showed resolution of the rectal inflammation and improvement in the sigmoid and descending colon inflammatory changes but still ongoing inflammation suspicious for segmental colitis associated with diverticular disease.  Biopsies did not definitively demonstrate typical findings of IBD in the sigmoid and descending colon.  No adjustments to his therapy were made as he was asymptomatic.    IBD Details:  Dx Date: 10/5/2020  Disease type/distribution:  Crohn's disease/colonic involvement (mostly rectum and sigmoid but with some mild descending inflammation endoscopically and more proximal colon involved histologically)  Current Treatment:  Lialda 4.8 g daily  Start Date:  November 2020  Response:  Unclear  Optimized:  Yes Adverse reactions:  None  Prior surgeries:  None  CRP Elevation: Y calprotectin:  Markedly elevated with active disease  Disease Complications:  None  Extraintestinal manifestations:  None  Prior treatments:   Steroids:  None  5ASA:  Currently on Lialda  IMM:  None  TNF Inh:  None   Anti-Integrin:  None   IL 12/23:  None  ADDISON Inh:  None    Previous Clinical Trials:  None    Last Colonoscopy:   August 5, 2022-resolution of rectal inflammation, ongoing inflammation in the sigmoid descending colon (biopsies not definitively consistent with IBD).  October 5, 2020-moderate inflammation of the rectum with less severe inflammation of the sigmoid and descending colon.  Remainder of the colon was normal appearing other diverticular disease.  Biopsies with pan colitis.  Ileum was normal and biopsies of the ileum were normal.    Other Endoscopies:   August 5, 2022-EGD for dysphagia with no significant esophageal stenosis.  Multiple previous colonoscopy reports reviewed with findings of inflammation in the sigmoid colon most consistent with segmental colitis associated with diverticular  disease verses diverticulitis    Imaging:   MRE:  None   CT:  May 2020-no evidence of small-bowel inflammation   Other:  None    Pertinent Labs:  Lab Results   Component Value Date    SEDRATE 16 (H) 10/08/2018    CRP 5.9 07/29/2022     Lab Results   Component Value Date    TTGIGA 9 05/04/2020     05/04/2020     Lab Results   Component Value Date    TSH 1.965 05/04/2020     Lab Results   Component Value Date    KAHVCMMH19SY 36 04/29/2022    PARUULJH83 627 04/29/2022     Lab Results   Component Value Date    HEPBSAG Negative 11/23/2015    HEPCAB Negative 02/07/2019     No results found for: KUG01VJYU  Lab Results   Component Value Date    NIL 0.050 10/19/2020    MITOGENNIL 3.840 10/19/2020     No results found for: TPTMINTERP, TPMTRESULT  Lab Results   Component Value Date    STOOLCULTURE  05/15/2020     No Salmonella,Shigella,Vibrio,Campylobacter,Yersinia isolated.    JDKKBHISND2V Negative 05/15/2020    MGXWZTJNWJ3A Negative 05/15/2020    CDIFFICILEAN Negative 05/15/2020    CDIFFTOX Negative 05/15/2020     Lab Results   Component Value Date    CALPROTECTIN 140.5 (H) 07/22/2021       Therapeutic Drug Monitoring Labs:  No results found for: PROMETH  No results found for: ANSADAINIT, INFLIXIMAB, INFLIXINTERP    Vaccinations:  No results found for: HEPBSAB  Lab Results   Component Value Date    HEPAIGG Negative 10/19/2020     Lab Results   Component Value Date    VARICELLAZOS 3.11 (H) 03/15/2021    VARICELLAINT Positive (A) 03/15/2021     Immunization History   Administered Date(s) Administered    COVID-19, MRNA, LN-S, PF (Pfizer) (Gray Cap) 04/08/2022    COVID-19, MRNA, LN-S, PF (Pfizer) (Purple Cap) 01/09/2021, 01/30/2021, 09/16/2021    Hepatitis A, Adult 03/01/2016    Influenza (FLUAD) - Quadrivalent - Adjuvanted - PF *Preferred* (65+) 04/08/2022    Influenza - High Dose - PF (65 years and older) 10/15/2013, 09/10/2014, 10/24/2015, 12/13/2016, 10/02/2017, 09/11/2018    Influenza - Quadrivalent 09/18/2019     Influenza - Quadrivalent - High Dose - PF (65 years and older) 08/25/2020    Influenza Whole 09/01/2015    Pneumococcal Conjugate - 13 Valent 03/01/2016, 12/13/2016    Pneumococcal Polysaccharide - 23 Valent 09/10/2014    Tdap 03/01/2016    Zoster 11/10/2014         Review of Systems  Review of Systems   Constitutional:  Negative for chills, fever and weight loss.   HENT:  Negative for sore throat.    Eyes:  Negative for pain, discharge and redness.   Respiratory:  Negative for cough, shortness of breath and wheezing.    Cardiovascular:  Negative for chest pain and leg swelling.   Gastrointestinal:  Negative for abdominal pain, blood in stool, constipation, diarrhea, heartburn, melena, nausea and vomiting.   Genitourinary:  Negative for dysuria and frequency.   Musculoskeletal:  Negative for back pain, joint pain and myalgias.   Skin:  Negative for rash.   Neurological:  Negative for focal weakness and seizures.   Endo/Heme/Allergies:  Does not bruise/bleed easily.   Psychiatric/Behavioral:  Negative for depression. The patient is not nervous/anxious.      Medical History:   Past Medical History:   Diagnosis Date    Basal cell carcinoma     BPH (benign prostatic hyperplasia)     Chronic nonallergic rhinitis 5/28/2020    Colon polyp     Crohn's disease     Hyperlipidemia     Hypertension     Liver cyst 1/11/2016    Prostate nodule     Squamous cell carcinoma     Thyroid nodule 5/28/2020       Surgical History:  Past Surgical History:   Procedure Laterality Date    APPENDECTOMY      COLONOSCOPY N/A 6/22/2016    Procedure: COLONOSCOPY;  Surgeon: Marco Mathews MD;  Location: [address]);  Service: Endoscopy;  Laterality: N/A;    COLONOSCOPY N/A 10/21/2019    Procedure: COLONOSCOPY;  Surgeon: Marco Mathews MD;  Location: [address]);  Service: Endoscopy;  Laterality: N/A;  First case of the day with me next available     COLONOSCOPY N/A 10/5/2020    Procedure: COLONOSCOPY;  Surgeon: LIZABETH Diaz  MD Rafael;  Location: [facility] (4TH FLR);  Service: Endoscopy;  Laterality: N/A;    COLONOSCOPY N/A 8/5/2022    Procedure: COLONOSCOPY;  Surgeon: Aidan Castro MD;  Location: [facility] (4TH FLR);  Service: Endoscopy;  Laterality: N/A;  Fully Vaccinated. Per  ok to use this date. EC    COLONOSCOPY W/ POLYPECTOMY      ENDOSCOPIC ULTRASOUND OF UPPER GASTROINTESTINAL TRACT N/A 6/24/2019    Procedure: ULTRASOUND, UPPER GI TRACT, ENDOSCOPIC;  Surgeon: Jeremy Saleem MD;  Location: [facility] (2ND FLR);  Service: Endoscopy;  Laterality: N/A;  For evaluation of low fecal elastase and loose stools and history of nonspecific finding of the EUS of the pancreas    ESOPHAGOGASTRODUODENOSCOPY      ESOPHAGOGASTRODUODENOSCOPY N/A 6/24/2019    Procedure: EGD (ESOPHAGOGASTRODUODENOSCOPY);  Surgeon: Jeremy Saleem MD;  Location: [facility] (2ND FLR);  Service: Endoscopy;  Laterality: N/A;  For evaluation of iron deficiency anemia    ESOPHAGOGASTRODUODENOSCOPY N/A 10/21/2019    Procedure: EGD (ESOPHAGOGASTRODUODENOSCOPY);  Surgeon: Marco Mathews MD;  Location: [facility] (4TH FLR);  Service: Endoscopy;  Laterality: N/A;  First case of the day with me next available     ESOPHAGOGASTRODUODENOSCOPY N/A 8/5/2022    Procedure: ESOPHAGOGASTRODUODENOSCOPY (EGD);  Surgeon: Aidan Castro MD;  Location: [facility] (4TH FLR);  Service: Endoscopy;  Laterality: N/A;    HERNIA REPAIR      KNEE ARTHROSCOPY W/ DEBRIDEMENT      TONSILLECTOMY, ADENOIDECTOMY      UPPER ENDOSCOPIC ULTRASOUND W/ FNA      VASECTOMY         Family History:   Family History   Problem Relation Age of Onset    Cancer Mother         melanoma    Cancer Father         skin cancer    Celiac disease Neg Hx     Cirrhosis Neg Hx     Colon cancer Neg Hx     Colon polyps Neg Hx     Crohn's disease Neg Hx     Esophageal cancer Neg Hx     Inflammatory bowel disease Neg Hx     Irritable bowel syndrome Neg Hx     Liver cancer Neg Hx     Rectal cancer Neg Hx     Stomach  cancer Neg Hx     Ulcerative colitis Neg Hx        Social History:   Social History     Tobacco Use    Smoking status: Former     Packs/day: 0.50     Years: 4.00     Pack years: 2.00     Types: Cigarettes     Quit date: 1966     Years since quittin.5    Smokeless tobacco: Never    Tobacco comments:     as teenager   Substance Use Topics    Alcohol use: Yes     Alcohol/week: 14.0 standard drinks     Types: 14 Glasses of wine per week     Comment: social    Drug use: No       Allergies: Reviewed    Home Medications:   Medication List with Changes/Refills   Current Medications    AMLODIPINE (NORVASC) 5 MG TABLET    TAKE 1 TABLET BY MOUTH EVERY DAY    AMOXICILLIN-CLAVULANATE 875-125MG (AUGMENTIN) 875-125 MG PER TABLET    Take 1 tablet by mouth 2 (two) times daily.    ASCORBIC ACID, VITAMIN C, (VITAMIN C) 1000 MG TABLET    Take 1,000 mg by mouth once daily.    ASPIRIN (ECOTRIN) 81 MG EC TABLET    Take 81 mg by mouth once daily.    CETIRIZINE (ZYRTEC) 10 MG TABLET    Take 10 mg by mouth once daily.    CLINDAMYCIN (CLEOCIN T) 1 % SWAB    APPLY EXTERNALLY TO THE AFFECTED AREA TWICE DAILY TO BIOPSY SITE    CREON 36,000-114,000- 180,000 UNIT CPDR    TAKE 3 CAPSULES BY MOUTH THREE TIMES DAILY WITH MEALS. TAKE 1 CAPSULE BY MOUTH WITH EVERY SNACK. DO NOT EXCEED 12 CAPSULE DAILY AS DIRECTED    ESCITALOPRAM OXALATE (LEXAPRO) 10 MG TABLET    Take 1 tablet (10 mg total) by mouth once daily. For anxiety/depression.    ESZOPICLONE (LUNESTA) 2 MG TAB    TAKE 1 TABLET BY MOUTH NIGHTLY    MESALAMINE (LIALDA) 1.2 GRAM TBEC    TAKE 2 TABLETS(2.4 GRAMS) BY MOUTH DAILY WITH BREAKFAST    MIRABEGRON (MYRBETRIQ) 25 MG TB24 ER TABLET    Take 1 tablet (25 mg total) by mouth once daily.    MULTIVITAMIN (ONE DAILY MULTIVITAMIN) PER TABLET    Take 1 tablet by mouth once daily.    PANTOPRAZOLE (PROTONIX) 40 MG TABLET    TAKE 1 TABLET(40 MG) BY MOUTH DAILY BEFORE BREAKFAST( BEST TAKEN 45-60 MINUTES BEFORE YOUR FIRST PROTEIN MEAL OF THE DAY  "BREAKFAST)    ROSUVASTATIN (CRESTOR) 20 MG TABLET    TAKE 1 TABLET(20 MG) BY MOUTH EVERY DAY    TADALAFIL (CIALIS) 20 MG TAB    Take 1 tablet (20 mg total) by mouth daily as needed (as needed).   Discontinued Medications    CIPROFLOXACIN HCL (CILOXAN) 0.3 % OPHTHALMIC SOLUTION    2-3 drops to affected eye qid    FLUOROURACIL (EFUDEX) 5 % CREAM        HYDROCORTISONE 2.5 % CREAM    APPLY EXTERNALLY TO THE AFFECTED AREA TO FACE TWICE DAILY FOR 5 TO 7 DAYS AS DIRECTED    LIPASE-PROTEASE-AMYLASE (CREON) 36,000-114,000- 180,000 UNIT CPDR    TAKE 3 CAPSULES BY MOUTH THREE TIMES DAILY WITH MEALS. TAKE 1 CAPSULE BY MOUTH WITH EVERY SNACK. DO NOT EXCEED 12 CAPSULE DAILY AS DIRECTED  Strength: 13287-920083- 180,000 unit    MULTIVITAMIN (THERAGRAN) PER TABLET    Take 1 tablet by mouth once daily.    TRIAMCINOLONE ACETONIDE 0.1% (KENALOG) 0.1 % CREAM    APPLY EXTERNALLY TO THE AFFECTED AREA ON ARMS TWICE DAILY FOR 10 TO 14 DAYS AS DIRECTED       Physical Exam:  Vital Signs:  /86 (BP Location: Left arm, Patient Position: Sitting)   Pulse 61   Temp 97.7 °F (36.5 °C)   Ht 5' 7" (1.702 m)   Wt 77.2 kg (170 lb 1.6 oz)   PF 96 L/min   BMI 26.64 kg/m²   Body mass index is 26.64 kg/m².    Physical Exam  Vitals and nursing note reviewed.   Constitutional:       General: He is not in acute distress.     Appearance: Normal appearance. He is well-developed. He is not ill-appearing or toxic-appearing.   HENT:      Head: Normocephalic and atraumatic.   Eyes:      General: No scleral icterus.     Pupils: Pupils are equal, round, and reactive to light.   Neck:      Thyroid: No thyromegaly.   Cardiovascular:      Rate and Rhythm: Normal rate and regular rhythm.      Heart sounds: No murmur heard.    No friction rub.   Pulmonary:      Effort: Pulmonary effort is normal.      Breath sounds: Normal breath sounds. No wheezing or rales.   Abdominal:      General: Bowel sounds are normal. There is no distension.      Palpations: Abdomen is " soft. There is no mass.      Tenderness: There is no abdominal tenderness. There is no guarding or rebound.   Lymphadenopathy:      Cervical: No cervical adenopathy.   Skin:     Findings: No rash.   Neurological:      General: No focal deficit present.      Mental Status: He is alert and oriented to person, place, and time.   Psychiatric:         Mood and Affect: Mood normal.         Behavior: Behavior normal.         Thought Content: Thought content normal.         Judgment: Judgment normal.       Labs: reviewed and pertinent noted above    Assessment/Plan:  Alejandro Wayne is a 77 y.o. male with Crohn's colitis. The following issues were addresssed:    1. Crohn's disease of colon without complication    2. Pancreatic insufficiency    3. Segmental colitis associated with diverticulosis      1.  Crohn's disease:  He continues to do well.  I recommend that he continue Lialda.  His rectal inflammation had resolved but there was still some ongoing sigmoid descending colon inflammation was suspicious for diverticular associated colitis.  Given his lack of symptoms I would recommend that he continue his current medical therapy.    2.  Pancreatic insufficiency:  Continue Creon.    3. Dysphagia:  No clear etiology on EGD.  Suspicious for presbyesophagus.      Ex smoker:  - recommended to continue smoking cessation  - discussed in detail that Crohn's disease can worsen with smoking and may make patient more resistant to treatment    # IBD specific health maintenance:  Colon cancer surveillance:  Up-to-date    Annual:  - Eye exam:  Discuss in the future  - Skin exam (if on IMM/TNF):  Scheduled  - reminded pt to use sunblock/hats/sunprotective clothing  - PAP (if immunosuppressed):  Not applicable    DEXA:  Discuss in the future-recommended due to age    Vitamin D:  Normal    Vaccines:    Influenza:  Up-to-date   Pneumovax:     PCV13:  Up-to-date    PSV23:  Up-to-date   HAV:  Discuss vaccination in the future   HBV:  Discuss  vaccination in the future   Tdap:  Up-to-date   MMR:  Up-to-date   VZV:  Immune   HZV:  Ordered at previous visit   HPV:  Not applicable   Meningococcus:  Not applicable   COVID:  Completed series and booster    Follow up: Follow up in about 6 months (around 5/28/2023).    Thank you again for sending Alejandro Wayne to see Dr. Yury Castro today at the [facility]. Please don't hesitate to contact Dr. Castro if there are any questions regarding this evaluation, or if you have any other patients with inflammatory bowel disease for whom you would like a consultation. You can reach Dr. Castro at [phone] or by email at [email or web address]    Aidan Castro MD  Department of Gastroenterology  Inflammatory Bowel Disease

## 2022-11-28 NOTE — PROGRESS NOTES
"IBD PATIENT INTAKE:    COVID symptoms in the last 7 days (runny nose, sore throat, congestion, cough, fever): No  PCP: Edward Sheppard  If not PCP-  number given to establish [phone]: No    ALLERGIES REVIEWED:  Yes    CHIEF COMPLAINT:    Chief Complaint   Patient presents with    Crohn's Disease       VITAL SIGNS:  /86 (BP Location: Left arm, Patient Position: Sitting)   Pulse 61   Temp 97.7 °F (36.5 °C)   Ht 5' 7" (1.702 m)   Wt 77.2 kg (170 lb 1.6 oz)   PF 96 L/min   BMI 26.64 kg/m²      Change in medical, surgical, family or social history: No    IBD THERAPY (name, dose/frequency):  CREON TAKE 3 CAPSULES BY MOUTH THREE TIMES DAILY WITH MEALS. TAKE 1 CAPSULE BY MOUTH WITH EVERY SNACK.  Lialda 2.4 g daily   Last dose:      Next dose:    Infusion/Pharmacy: NA    NSAIDs (aspirin, ibuprofen-advil or motrin, naproxen-aleve, diclofenac-voltaren, BC powder, excedrin, goodies): Yes Baby Asa    Alternative/Complementary Medications (i.e. probiotics, turmeric, fish oil, aloe vera):      No  Name/dose:      Vitamins:   Vit D:  no     Vit B-12:  no   Folic Acid: no       Calcium: no     Iron:  no      MVI: yes    Antibiotics (past 30 Days):  no  If yes   Indication:  Name of antibiotic:  Completion date:     REVIEWED MEDICATION LIST RECONCILED INCLUDING ABOVE MEDS:  no                    "

## 2023-01-03 DIAGNOSIS — Z79.899 ENCOUNTER FOR MONITORING LONG-TERM PROTON PUMP INHIBITOR THERAPY: Primary | ICD-10-CM

## 2023-01-03 DIAGNOSIS — Z51.81 ENCOUNTER FOR MONITORING LONG-TERM PROTON PUMP INHIBITOR THERAPY: Primary | ICD-10-CM

## 2023-03-02 RX ORDER — PANTOPRAZOLE SODIUM 20 MG/1
20 TABLET, DELAYED RELEASE ORAL
Qty: 90 TABLET | Refills: 3 | Status: SHIPPED | OUTPATIENT
Start: 2023-03-02 | End: 2023-09-30

## 2023-04-11 ENCOUNTER — HOSPITAL ENCOUNTER (OUTPATIENT)
Dept: RADIOLOGY | Facility: OTHER | Age: 78
Discharge: HOME | End: 2023-04-11
Attending: INTERNAL MEDICINE
Payer: COMMERCIAL

## 2023-04-11 DIAGNOSIS — R05.1 ACUTE COUGH: ICD-10-CM

## 2023-04-11 PROCEDURE — 71046 X-RAY EXAM CHEST 2 VIEWS: CPT | Mod: 26,,, | Performed by: RADIOLOGY

## 2023-04-11 PROCEDURE — 71046 XR CHEST PA AND LATERAL: ICD-10-PCS | Mod: 26,,, | Performed by: RADIOLOGY

## 2023-04-11 PROCEDURE — 71046 X-RAY EXAM CHEST 2 VIEWS: CPT | Mod: TC,FY

## 2023-05-22 ENCOUNTER — OFFICE VISIT (OUTPATIENT)
Dept: GASTROENTEROLOGY | Facility: CLINIC | Age: 78
End: 2023-05-22
Payer: COMMERCIAL

## 2023-05-22 VITALS
SYSTOLIC BLOOD PRESSURE: 130 MMHG | WEIGHT: 166.69 LBS | HEART RATE: 75 BPM | TEMPERATURE: 98 F | DIASTOLIC BLOOD PRESSURE: 77 MMHG | BODY MASS INDEX: 26.1 KG/M2

## 2023-05-22 DIAGNOSIS — K50.10 SEGMENTAL COLITIS ASSOCIATED WITH DIVERTICULOSIS: ICD-10-CM

## 2023-05-22 DIAGNOSIS — K86.89 PANCREATIC INSUFFICIENCY: ICD-10-CM

## 2023-05-22 DIAGNOSIS — K57.30 SEGMENTAL COLITIS ASSOCIATED WITH DIVERTICULOSIS: ICD-10-CM

## 2023-05-22 DIAGNOSIS — K50.10 CROHN'S DISEASE OF COLON WITHOUT COMPLICATION: Primary | ICD-10-CM

## 2023-05-22 PROCEDURE — 3288F FALL RISK ASSESSMENT DOCD: CPT | Mod: CPTII,S$GLB,, | Performed by: INTERNAL MEDICINE

## 2023-05-22 PROCEDURE — 99213 OFFICE O/P EST LOW 20 MIN: CPT | Mod: S$GLB,,, | Performed by: INTERNAL MEDICINE

## 2023-05-22 PROCEDURE — 3075F SYST BP GE 130 - 139MM HG: CPT | Mod: CPTII,S$GLB,, | Performed by: INTERNAL MEDICINE

## 2023-05-22 PROCEDURE — 1160F RVW MEDS BY RX/DR IN RCRD: CPT | Mod: CPTII,S$GLB,, | Performed by: INTERNAL MEDICINE

## 2023-05-22 PROCEDURE — 3078F DIAST BP <80 MM HG: CPT | Mod: CPTII,S$GLB,, | Performed by: INTERNAL MEDICINE

## 2023-05-22 PROCEDURE — 1160F PR REVIEW ALL MEDS BY PRESCRIBER/CLIN PHARMACIST DOCUMENTED: ICD-10-PCS | Mod: CPTII,S$GLB,, | Performed by: INTERNAL MEDICINE

## 2023-05-22 PROCEDURE — 3075F PR MOST RECENT SYSTOLIC BLOOD PRESS GE 130-139MM HG: ICD-10-PCS | Mod: CPTII,S$GLB,, | Performed by: INTERNAL MEDICINE

## 2023-05-22 PROCEDURE — 1159F PR MEDICATION LIST DOCUMENTED IN MEDICAL RECORD: ICD-10-PCS | Mod: CPTII,S$GLB,, | Performed by: INTERNAL MEDICINE

## 2023-05-22 PROCEDURE — 1126F AMNT PAIN NOTED NONE PRSNT: CPT | Mod: CPTII,S$GLB,, | Performed by: INTERNAL MEDICINE

## 2023-05-22 PROCEDURE — 1101F PR PT FALLS ASSESS DOC 0-1 FALLS W/OUT INJ PAST YR: ICD-10-PCS | Mod: CPTII,S$GLB,, | Performed by: INTERNAL MEDICINE

## 2023-05-22 PROCEDURE — 99213 PR OFFICE/OUTPT VISIT, EST, LEVL III, 20-29 MIN: ICD-10-PCS | Mod: S$GLB,,, | Performed by: INTERNAL MEDICINE

## 2023-05-22 PROCEDURE — 1126F PR PAIN SEVERITY QUANTIFIED, NO PAIN PRESENT: ICD-10-PCS | Mod: CPTII,S$GLB,, | Performed by: INTERNAL MEDICINE

## 2023-05-22 PROCEDURE — 3078F PR MOST RECENT DIASTOLIC BLOOD PRESSURE < 80 MM HG: ICD-10-PCS | Mod: CPTII,S$GLB,, | Performed by: INTERNAL MEDICINE

## 2023-05-22 PROCEDURE — 1101F PT FALLS ASSESS-DOCD LE1/YR: CPT | Mod: CPTII,S$GLB,, | Performed by: INTERNAL MEDICINE

## 2023-05-22 PROCEDURE — 3288F PR FALLS RISK ASSESSMENT DOCUMENTED: ICD-10-PCS | Mod: CPTII,S$GLB,, | Performed by: INTERNAL MEDICINE

## 2023-05-22 PROCEDURE — 1159F MED LIST DOCD IN RCRD: CPT | Mod: CPTII,S$GLB,, | Performed by: INTERNAL MEDICINE

## 2023-05-22 RX ORDER — IBUPROFEN 800 MG/1
800 TABLET ORAL EVERY 12 HOURS PRN
COMMUNITY
Start: 2023-05-15

## 2023-05-22 RX ORDER — FEXOFENADINE HCL 60 MG
60 TABLET ORAL DAILY
COMMUNITY

## 2023-05-22 RX ORDER — MESALAMINE 1.2 G/1
4.8 TABLET, DELAYED RELEASE ORAL
COMMUNITY
End: 2023-05-22 | Stop reason: SDUPTHER

## 2023-05-22 RX ORDER — MESALAMINE 1.2 G/1
4.8 TABLET, DELAYED RELEASE ORAL
Qty: 360 TABLET | Refills: 3 | Status: SHIPPED | OUTPATIENT
Start: 2023-05-22 | End: 2024-04-02 | Stop reason: SDUPTHER

## 2023-05-22 NOTE — PROGRESS NOTES
IBD PATIENT INTAKE:    COVID symptoms in the last 7 days (runny nose, sore throat, congestion, cough, fever): No  PCP: Edward Sheppard  If not PCP-  number given to establish [phone]: N/A    ALLERGIES REVIEWED:  Yes    CHIEF COMPLAINT:    Chief Complaint   Patient presents with    Crohn's Disease       VITAL SIGNS:  /77 (BP Location: Left arm, Patient Position: Sitting)   Pulse 75   Temp 98.1 °F (36.7 °C)   Wt 75.6 kg (166 lb 10.7 oz)   BMI 26.10 kg/m²      Change in medical, surgical, family or social history: No    IBD THERAPY (name, dose/frequency):  Lialda 4.8g  Last dose:  n/a    Next dose:  n/a  Infusion/Pharmacy: NA    NSAIDs (aspirin, ibuprofen-advil or motrin, naproxen-aleve, diclofenac-voltaren, BC powder, excedrin, goodies): Yes ASA 81    Alternative/Complementary Medications (i.e. probiotics, turmeric, fish oil, aloe vera):      No  Name/dose:  n/a    Vitamins:   Vit D:  no     Vit B-12:  no   Folic Acid: no       Calcium: no     Iron:  no      MVI: yes    Antibiotics (past 30 Days):  No  If yes   Indication:  Name of antibiotic:  Completion date:     REVIEWED MEDICATION LIST RECONCILED INCLUDING ABOVE MEDS:  Yes

## 2023-05-22 NOTE — PROGRESS NOTES
[facility]          Inflammatory Bowel Disease Follow Up Note         TODAY'S VISIT DATE:  5/22/2023    Reason for Consult:    Chief Complaint   Patient presents with    Crohn's Disease       PCP: Edward Sheppard      Referring MD:   No ref. provider found    History of Present Illness:  Alejandro Wayne who is a 78 y.o. male is being seen today at the [facility] on 05/22/2023 for inflammatory bowel disease- Crohn's disease.  He continues to do very well.  He has been consistent with taking his Lialda and Creon.  He reports some fluctuations in his bowel movements but nothing on a consistent basis.  He occasionally has some loose bowel movements but occasionally they are more formed.  He denies any blood in the stools, abdominal pain, nausea, vomiting, fevers, chills.  He denies any weight loss.  He denies any side effects related to his medications.  He denies any steatorrhea.    IBD History:  He has had several years of intermittent gastrointestinal problems.  He was initially seen by Dr. Mathews in November of 2015 with complaints of chronic diarrhea.  At that time he was found to have a low fecal elastase level consistent with pancreatic insufficiency.  Endoscopic ultrasound was performed and found changes consistent with mild chronic pancreatitis.  He has a history of chronic alcohol use.  A CT scan was also performed around that time which showed changes consistent with diverticulitis.  In June of 2016 he underwent a screening colonoscopy which revealed a normal terminal ileum, multiple diverticula in the sigmoid, descending, transverse, and ascending colon.  There was also some moderately erythematous mucosa in the mid sigmoid and distal sigmoid colon and biopsies were taken which did not show any significant features.  In October 2018 he had a repeat CT scan done because of abdominal pain and he was again found to have multiple diverticula  as well as some luminal narrowing and wall thickening and changes consistent with sigmoid diverticulitis.  In October 2019 he underwent an upper endoscopy and colonoscopy because of iron deficiency anemia and diarrhea.  The upper endoscopy revealed a small hiatal hernia but was otherwise normal.  Colonoscopy revealed a normal terminal ileum, multiple diverticula, moderately erythematous mucosa in the sigmoid colon with purulent exudate consistent with acute diverticulitis.  A CT scan again revealed changes with diverticulitis.  In November 2019 he was seen in follow-up by Colorectal surgery and a partial colectomy was recommended because of recurrent diverticulitis.  This was delayed due to family medical issues.  In May of this year he presented to Colorectal surgery Clinic again with complaints of loose stools.  A repeat CT scan showed finding similar to all of his previous CT scans.  He underwent colonoscopy on October 5, 2020 because of ongoing diarrhea issues and was found to have inflamed ulcerated mucosa in the anus and rectum, sigmoid colon, distal descending colon.  The more proximal colon appeared normal.  There was also a mild anal stricture.  Biopsies from this colonoscopy revealed a normal terminal ileum.  Biopsies of the right colon revealed chronic and superficial acute colitis.  Left colon biopsies revealed chronic colitis as well as at least 1 rectal biopsy revealing an intramucosal granuloma.  He was started on Lialda in November 2020. He had a significant improvement after starting Lialda but at his 1st follow-up he was noted only be taking 1.2 g daily.  We increase the dose at that time to 2.4 g daily.  In March 2021 he had a continued elevation of his fecal calprotectin level so the dose was increased to 4.8 g daily.  His CRP had normalized.  A repeat stool calprotectin level in July 2021 showed a significant decline in the stool calprotectin.  Follow-up colonoscopy in August 2022 showed  resolution of the rectal inflammation and improvement in the sigmoid and descending colon inflammatory changes but still ongoing inflammation suspicious for segmental colitis associated with diverticular disease.  Biopsies did not definitively demonstrate typical findings of IBD in the sigmoid and descending colon.  No adjustments to his therapy were made as he was asymptomatic.    IBD Details:  Dx Date: 10/5/2020  Disease type/distribution:  Crohn's disease/colonic involvement (mostly rectum and sigmoid but with some mild descending inflammation endoscopically and more proximal colon involved histologically)  Current Treatment:  Lialda 4.8 g daily  Start Date:  November 2020  Response:  Unclear  Optimized:  Yes Adverse reactions:  None  Prior surgeries:  None  CRP Elevation: Y calprotectin:  Markedly elevated with active disease  Disease Complications:  None  Extraintestinal manifestations:  None  Prior treatments:   Steroids:  None  5ASA:  Currently on Lialda  IMM:  None  TNF Inh:  None   Anti-Integrin:  None   IL 12/23:  None  ADDISON Inh:  None    Previous Clinical Trials:  None    Last Colonoscopy:   August 5, 2022-resolution of rectal inflammation, ongoing inflammation in the sigmoid descending colon (biopsies not definitively consistent with IBD).  October 5, 2020-moderate inflammation of the rectum with less severe inflammation of the sigmoid and descending colon.  Remainder of the colon was normal appearing other diverticular disease.  Biopsies with pan colitis.  Ileum was normal and biopsies of the ileum were normal.    Other Endoscopies:   August 5, 2022-EGD for dysphagia with no significant esophageal stenosis.  Multiple previous colonoscopy reports reviewed with findings of inflammation in the sigmoid colon most consistent with segmental colitis associated with diverticular disease verses diverticulitis    Imaging:   MRE:  None   CT:  May 2020-no evidence of small-bowel inflammation   Other:   None    Pertinent Labs:  Lab Results   Component Value Date    SEDRATE 16 (H) 10/08/2018    CRP 5.9 07/29/2022     Lab Results   Component Value Date    TTGIGA 9 05/04/2020     05/04/2020     Lab Results   Component Value Date    TSH 1.965 05/04/2020     Lab Results   Component Value Date    UFZOMHFY47XT 36 04/29/2022    SILAYLQB06 627 04/29/2022     Lab Results   Component Value Date    HEPBSAG Negative 11/23/2015    HEPCAB Negative 02/07/2019     No results found for: YJE63GMMZ  Lab Results   Component Value Date    NIL 0.050 10/19/2020    MITOGENNIL 3.840 10/19/2020     No results found for: TPTMINTERP, TPMTRESULT  Lab Results   Component Value Date    STOOLCULTURE  05/15/2020     No Salmonella,Shigella,Vibrio,Campylobacter,Yersinia isolated.    LKDJBEOAJZ9X Negative 05/15/2020    LLQFAPOYXH3W Negative 05/15/2020    CDIFFICILEAN Negative 05/15/2020    CDIFFTOX Negative 05/15/2020     Lab Results   Component Value Date    CALPROTECTIN 140.5 (H) 07/22/2021       Therapeutic Drug Monitoring Labs:  No results found for: PROMETH  No results found for: ANSADAINIT, INFLIXIMAB, INFLIXINTERP    Vaccinations:  No results found for: HEPBSAB  Lab Results   Component Value Date    HEPAIGG Negative 10/19/2020     Lab Results   Component Value Date    VARICELLAZOS 3.11 (H) 03/15/2021    VARICELLAINT Positive (A) 03/15/2021     Immunization History   Administered Date(s) Administered    COVID-19, MRNA, LN-S, PF (Pfizer) (Gray Cap) 04/08/2022    COVID-19, MRNA, LN-S, PF (Pfizer) (Purple Cap) 01/09/2021, 01/30/2021, 09/16/2021    COVID-19, mRNA, LNP-S, bivalent booster, PF (PFIZER OMICRON) 10/17/2022    Hepatitis A, Adult 03/01/2016    Influenza (FLUAD) - Quadrivalent - Adjuvanted - PF *Preferred* (65+) 04/08/2022    Influenza - High Dose - PF (65 years and older) 10/15/2013, 09/10/2014, 10/24/2015, 12/13/2016, 10/02/2017, 09/11/2018    Influenza - Quadrivalent 09/18/2019    Influenza - Quadrivalent - High Dose - PF (65  years and older) 08/25/2020, 10/11/2022    Influenza Whole 09/01/2015    Pneumococcal Conjugate - 13 Valent 03/01/2016, 12/13/2016    Pneumococcal Polysaccharide - 23 Valent 09/10/2014    Tdap 03/01/2016    Zoster 11/10/2014    Zoster Recombinant 12/13/2022         Review of Systems  Review of Systems   Constitutional:  Negative for chills, fever and weight loss.   HENT:  Negative for sore throat.    Eyes:  Negative for pain, discharge and redness.   Respiratory:  Negative for cough, shortness of breath and wheezing.    Cardiovascular:  Negative for chest pain and leg swelling.   Gastrointestinal:  Negative for abdominal pain, blood in stool, constipation, diarrhea, heartburn, melena, nausea and vomiting.   Genitourinary:  Negative for dysuria and frequency.   Musculoskeletal:  Negative for back pain, joint pain and myalgias.   Skin:  Negative for rash.   Neurological:  Negative for focal weakness and seizures.   Endo/Heme/Allergies:  Does not bruise/bleed easily.   Psychiatric/Behavioral:  Negative for depression. The patient is not nervous/anxious.      Medical History:   Past Medical History:   Diagnosis Date    Basal cell carcinoma     BPH (benign prostatic hyperplasia)     Chronic nonallergic rhinitis 5/28/2020    Colon polyp     Crohn's disease     Hyperlipidemia     Hypertension     Liver cyst 1/11/2016    Prostate nodule     Squamous cell carcinoma     Thyroid nodule 5/28/2020       Surgical History:  Past Surgical History:   Procedure Laterality Date    APPENDECTOMY      COLONOSCOPY N/A 6/22/2016    Procedure: COLONOSCOPY;  Surgeon: Marco Mathews MD;  Location: [address]);  Service: Endoscopy;  Laterality: N/A;    COLONOSCOPY N/A 10/21/2019    Procedure: COLONOSCOPY;  Surgeon: Marco Mathews MD;  Location: [address];  Service: Endoscopy;  Laterality: N/A;  First case of the day with me next available     COLONOSCOPY N/A 10/5/2020    Procedure: COLONOSCOPY;  Surgeon: LIZABETH Diaz  MD Rafael;  Location: [facility] (4TH FLR);  Service: Endoscopy;  Laterality: N/A;    COLONOSCOPY N/A 8/5/2022    Procedure: COLONOSCOPY;  Surgeon: Aidan Castro MD;  Location: [facility] (4TH FLR);  Service: Endoscopy;  Laterality: N/A;  Fully Vaccinated. Per  ok to use this date. EC    COLONOSCOPY W/ POLYPECTOMY      ENDOSCOPIC ULTRASOUND OF UPPER GASTROINTESTINAL TRACT N/A 6/24/2019    Procedure: ULTRASOUND, UPPER GI TRACT, ENDOSCOPIC;  Surgeon: Jeremy Saleem MD;  Location: [facility] (2ND FLR);  Service: Endoscopy;  Laterality: N/A;  For evaluation of low fecal elastase and loose stools and history of nonspecific finding of the EUS of the pancreas    ESOPHAGOGASTRODUODENOSCOPY      ESOPHAGOGASTRODUODENOSCOPY N/A 6/24/2019    Procedure: EGD (ESOPHAGOGASTRODUODENOSCOPY);  Surgeon: Jeremy Saleem MD;  Location: [facility] (2ND FLR);  Service: Endoscopy;  Laterality: N/A;  For evaluation of iron deficiency anemia    ESOPHAGOGASTRODUODENOSCOPY N/A 10/21/2019    Procedure: EGD (ESOPHAGOGASTRODUODENOSCOPY);  Surgeon: Marco Mathews MD;  Location: [facility] (4TH FLR);  Service: Endoscopy;  Laterality: N/A;  First case of the day with me next available     ESOPHAGOGASTRODUODENOSCOPY N/A 8/5/2022    Procedure: ESOPHAGOGASTRODUODENOSCOPY (EGD);  Surgeon: Aidan Castro MD;  Location: [facility] (4TH FLR);  Service: Endoscopy;  Laterality: N/A;    HERNIA REPAIR      KNEE ARTHROSCOPY W/ DEBRIDEMENT      TONSILLECTOMY, ADENOIDECTOMY      UPPER ENDOSCOPIC ULTRASOUND W/ FNA      VASECTOMY         Family History:   Family History   Problem Relation Age of Onset    Cancer Mother         melanoma    Cancer Father         skin cancer    Celiac disease Neg Hx     Cirrhosis Neg Hx     Colon cancer Neg Hx     Colon polyps Neg Hx     Crohn's disease Neg Hx     Esophageal cancer Neg Hx     Inflammatory bowel disease Neg Hx     Irritable bowel syndrome Neg Hx     Liver cancer Neg Hx     Rectal cancer Neg Hx     Stomach  cancer Neg Hx     Ulcerative colitis Neg Hx        Social History:   Social History     Tobacco Use    Smoking status: Former     Packs/day: 0.50     Years: 4.00     Pack years: 2.00     Types: Cigarettes     Quit date: 1966     Years since quittin.0    Smokeless tobacco: Never    Tobacco comments:     as teenager   Substance Use Topics    Alcohol use: Yes     Alcohol/week: 14.0 standard drinks     Types: 14 Glasses of wine per week     Comment: social    Drug use: No       Allergies: Reviewed    Home Medications:   Medication List with Changes/Refills   Current Medications    AMLODIPINE (NORVASC) 5 MG TABLET    TAKE 1 TABLET BY MOUTH EVERY DAY    ASCORBIC ACID, VITAMIN C, (VITAMIN C) 1000 MG TABLET    Take 1,000 mg by mouth once daily.    ASPIRIN (ECOTRIN) 81 MG EC TABLET    Take 81 mg by mouth once daily.    CETIRIZINE (ZYRTEC) 10 MG TABLET    Take 10 mg by mouth once daily.    CREON 36,000-114,000- 180,000 UNIT CPDR    TAKE 3 CAPSULES BY MOUTH THREE TIMES DAILY WITH MEALS. TAKE 1 CAPSULE BY MOUTH WITH EVERY SNACK. DO NOT EXCEED 12 CAPSULE DAILY AS DIRECTED    ESCITALOPRAM OXALATE (LEXAPRO) 10 MG TABLET    Take 1 tablet (10 mg total) by mouth once daily. For anxiety/depression.    ESZOPICLONE (LUNESTA) 2 MG TAB    TAKE 1 TABLET BY MOUTH ONCE NIGHTLY    FEXOFENADINE (ALLEGRA) 60 MG TABLET    Take 60 mg by mouth once daily.    IBUPROFEN (ADVIL,MOTRIN) 800 MG TABLET    Take 800 mg by mouth every 12 (twelve) hours as needed.    MIRABEGRON (MYRBETRIQ) 25 MG TB24 ER TABLET    Take 1 tablet (25 mg total) by mouth once daily.    MIRABEGRON (MYRBETRIQ) 25 MG TB24 ER TABLET    TAKE 1 TABLET(25 MG) BY MOUTH EVERY DAY    MULTIVITAMIN (THERAGRAN) PER TABLET    Take 1 tablet by mouth once daily.    PANTOPRAZOLE (PROTONIX) 20 MG TABLET    Take 1 tablet (20 mg total) by mouth before breakfast.    ROSUVASTATIN (CRESTOR) 20 MG TABLET    TAKE 1 TABLET(20 MG) BY MOUTH EVERY DAY    TADALAFIL (CIALIS) 20 MG TAB    Take 1  tablet (20 mg total) by mouth daily as needed (as needed).   Changed and/or Refilled Medications    Modified Medication Previous Medication    MESALAMINE (LIALDA) 1.2 GRAM TBEC mesalamine (LIALDA) 1.2 gram TbEC       Take 4 tablets (4.8 g total) by mouth daily with breakfast.    Take 4.8 g by mouth daily with breakfast.   Discontinued Medications    CIPROFLOXACIN HCL (CILOXAN) 0.3 % OPHTHALMIC SOLUTION    INSTILL 2-3 DROPS FOUR TIMES DAILY TO AFFECTED EYE    CLINDAMYCIN (CLEOCIN T) 1 % SWAB    APPLY EXTERNALLY TO THE AFFECTED AREA TWICE DAILY TO BIOPSY SITE    MESALAMINE (CANASA) 1000 MG SUPP    Place 1 suppository (1,000 mg total) rectally 2 (two) times daily.    MESALAMINE (CANASA) 1000 MG SUPP    Place 1 suppository (1,000 mg total) rectally nightly.    MESALAMINE (LIALDA) 1.2 GRAM TBEC    TAKE 2 TABLETS(2.4 GRAMS) BY MOUTH DAILY WITH BREAKFAST       Physical Exam:  Vital Signs:  /77 (BP Location: Left arm, Patient Position: Sitting)   Pulse 75   Temp 98.1 °F (36.7 °C)   Wt 75.6 kg (166 lb 10.7 oz)   BMI 26.10 kg/m²   Body mass index is 26.1 kg/m².    Physical Exam  Vitals and nursing note reviewed.   Constitutional:       General: He is not in acute distress.     Appearance: Normal appearance. He is well-developed. He is not ill-appearing or toxic-appearing.   HENT:      Head: Normocephalic and atraumatic.   Eyes:      General: No scleral icterus.     Pupils: Pupils are equal, round, and reactive to light.   Neck:      Thyroid: No thyromegaly.   Cardiovascular:      Rate and Rhythm: Normal rate and regular rhythm.      Heart sounds: No murmur heard.    No friction rub.   Pulmonary:      Effort: Pulmonary effort is normal.      Breath sounds: Normal breath sounds. No wheezing or rales.   Abdominal:      General: Bowel sounds are normal. There is no distension.      Palpations: Abdomen is soft. There is no mass.      Tenderness: There is no abdominal tenderness. There is no guarding or rebound.    Lymphadenopathy:      Cervical: No cervical adenopathy.   Skin:     Findings: No rash.   Neurological:      General: No focal deficit present.      Mental Status: He is alert and oriented to person, place, and time.   Psychiatric:         Mood and Affect: Mood normal.         Behavior: Behavior normal.         Thought Content: Thought content normal.         Judgment: Judgment normal.       Labs: reviewed and pertinent noted above    Assessment/Plan:  Alejandro Wayne is a 78 y.o. male with Crohn's colitis. The following issues were addresssed:    1. Crohn's disease of colon without complication    2. Pancreatic insufficiency    3. Segmental colitis associated with diverticulosis      1.  Crohn's disease:  He continues to do well.  I recommend that he continue Lialda.     2.  Pancreatic insufficiency:  Continue Creon.    3. Segmental colitis associated with diverticulosis: Continue mesalamine.  Asymptomatic.  Continue to monitor.      Ex smoker:  - recommended to continue smoking cessation  - discussed in detail that Crohn's disease can worsen with smoking and may make patient more resistant to treatment    # IBD specific health maintenance:  Colon cancer surveillance:  Up-to-date    Annual:  - Eye exam:  Discuss in the future  - Skin exam (if on IMM/TNF):  Scheduled  - reminded pt to use sunblock/hats/sunprotective clothing  - PAP (if immunosuppressed):  Not applicable    DEXA:  Discuss in the future-recommended due to age    Vitamin D:  Normal    Vaccines:    Influenza:  Up-to-date   Pneumovax:     PCV13:  Up-to-date    PSV23:  Up-to-date   HAV:  Discuss vaccination in the future   HBV:  Discuss vaccination in the future   Tdap:  Up-to-date   MMR:  Up-to-date   VZV:  Immune   HZV:  Ordered at previous visit   HPV:  Not applicable   Meningococcus:  Not applicable   COVID:  Completed series and booster    Follow up: Follow up in about 1 year (around 5/22/2024).    Thank you again for sending Alejandro Wayne to see   Yury Castro today at the [facility]. Please don't hesitate to contact Dr. Castro if there are any questions regarding this evaluation, or if you have any other patients with inflammatory bowel disease for whom you would like a consultation. You can reach Dr. Castro at [phone] or by email at [email or web address]    Aidan Castro MD  Department of Gastroenterology  Inflammatory Bowel Disease

## 2023-09-19 ENCOUNTER — HOSPITAL ENCOUNTER (OUTPATIENT)
Dept: CARDIOLOGY | Facility: OTHER | Age: 78
Discharge: HOME | End: 2023-09-19
Attending: INTERNAL MEDICINE
Payer: COMMERCIAL

## 2023-09-19 VITALS — HEART RATE: 64 BPM | SYSTOLIC BLOOD PRESSURE: 143 MMHG | DIASTOLIC BLOOD PRESSURE: 86 MMHG

## 2023-09-19 DIAGNOSIS — R06.02 SOB (SHORTNESS OF BREATH): ICD-10-CM

## 2023-09-19 LAB
CV ECHO LV RWT: 0.4 CM
CV STRESS BASE HR: 64 BPM
DIASTOLIC BLOOD PRESSURE: 86 MMHG
ECHO LV POSTERIOR WALL: 0.88 CM (ref 0.6–1.1)
FRACTIONAL SHORTENING: 28 % (ref 28–44)
INTERVENTRICULAR SEPTUM: 0.81 CM (ref 0.6–1.1)
LEFT INTERNAL DIMENSION IN SYSTOLE: 3.22 CM (ref 2.1–4)
LEFT VENTRICLE DIASTOLIC VOLUME: 90.21 ML
LEFT VENTRICLE SYSTOLIC VOLUME: 41.73 ML
LEFT VENTRICULAR INTERNAL DIMENSION IN DIASTOLE: 4.45 CM (ref 3.5–6)
LEFT VENTRICULAR MASS: 119.87 G
OHS CV CPX 85 PERCENT MAX PREDICTED HEART RATE MALE: 121
OHS CV CPX ESTIMATED METS: 95
OHS CV CPX MAX PREDICTED HEART RATE: 142
OHS CV CPX PATIENT IS FEMALE: 0
OHS CV CPX PATIENT IS MALE: 1
OHS CV CPX PEAK DIASTOLIC BLOOD PRESSURE: 87 MMHG
OHS CV CPX PEAK HEAR RATE: 136 BPM
OHS CV CPX PEAK RATE PRESSURE PRODUCT: NORMAL
OHS CV CPX PEAK SYSTOLIC BLOOD PRESSURE: 100 MMHG
OHS CV CPX PERCENT MAX PREDICTED HEART RATE ACHIEVED: 96
OHS CV CPX RATE PRESSURE PRODUCT PRESENTING: 9152
STRESS ECHO POST EXERCISE DUR MIN: 9 MINUTES
STRESS ECHO POST EXERCISE DUR SEC: 0 SECONDS
SYSTOLIC BLOOD PRESSURE: 143 MMHG

## 2023-09-19 PROCEDURE — 93351 STRESS ECHO (CUPID ONLY): ICD-10-PCS | Mod: 26,,, | Performed by: INTERNAL MEDICINE

## 2023-09-19 PROCEDURE — 93351 STRESS TTE COMPLETE: CPT | Mod: 26,,, | Performed by: INTERNAL MEDICINE

## 2023-09-19 PROCEDURE — 93351 STRESS TTE COMPLETE: CPT

## 2023-09-21 ENCOUNTER — TELEPHONE (OUTPATIENT)
Dept: DERMATOLOGY | Facility: CLINIC | Age: 78
End: 2023-09-21
Payer: COMMERCIAL

## 2023-09-21 NOTE — TELEPHONE ENCOUNTER
Received path report/photo from Dr. Chi for sup BCC right side of nose only noted in curettings and not in actual biopsy.  Rec tx with imiquimod 5x/week for 6 weeks. Disc with Dr Chi who is in agreement.  Called pt today to let him know the plan is for topical treatment, disc how to apply, limited amount, risk of flu-like symptoms.  Will have pt f/u with Dr. Chi and consider Mohs pending progression.

## 2023-09-30 RX ORDER — PANTOPRAZOLE SODIUM 20 MG/1
TABLET, DELAYED RELEASE ORAL
Qty: 90 TABLET | Refills: 3 | Status: SHIPPED | OUTPATIENT
Start: 2023-09-30

## 2023-12-28 DIAGNOSIS — I10 ESSENTIAL HYPERTENSION: ICD-10-CM

## 2023-12-28 RX ORDER — AMLODIPINE BESYLATE 5 MG/1
TABLET ORAL
Qty: 90 TABLET | Refills: 4 | Status: SHIPPED | OUTPATIENT
Start: 2023-12-28

## 2024-01-12 DIAGNOSIS — K86.81 EXOCRINE PANCREATIC INSUFFICIENCY: ICD-10-CM

## 2024-01-13 DIAGNOSIS — K86.81 EXOCRINE PANCREATIC INSUFFICIENCY: ICD-10-CM

## 2024-01-13 DIAGNOSIS — Z79.899 ENCOUNTER FOR MONITORING LONG-TERM PROTON PUMP INHIBITOR THERAPY: Primary | ICD-10-CM

## 2024-01-13 DIAGNOSIS — Z51.81 ENCOUNTER FOR MONITORING LONG-TERM PROTON PUMP INHIBITOR THERAPY: Primary | ICD-10-CM

## 2024-01-13 RX ORDER — PANCRELIPASE 36000; 180000; 114000 [USP'U]/1; [USP'U]/1; [USP'U]/1
CAPSULE, DELAYED RELEASE PELLETS ORAL
Qty: 810 CAPSULE | Refills: 3 | OUTPATIENT
Start: 2024-01-13

## 2024-01-13 RX ORDER — PANCRELIPASE 36000; 180000; 114000 [USP'U]/1; [USP'U]/1; [USP'U]/1
3 CAPSULE, DELAYED RELEASE PELLETS ORAL
Qty: 1620 CAPSULE | Refills: 0 | Status: SHIPPED | OUTPATIENT
Start: 2024-01-13 | End: 2024-07-11

## 2024-01-30 ENCOUNTER — OFFICE VISIT (OUTPATIENT)
Dept: SLEEP MEDICINE | Facility: CLINIC | Age: 79
End: 2024-01-30
Payer: COMMERCIAL

## 2024-01-30 VITALS
SYSTOLIC BLOOD PRESSURE: 130 MMHG | DIASTOLIC BLOOD PRESSURE: 82 MMHG | WEIGHT: 163.13 LBS | BODY MASS INDEX: 25.6 KG/M2 | HEIGHT: 67 IN | HEART RATE: 70 BPM

## 2024-01-30 DIAGNOSIS — R06.83 SNORING: Primary | ICD-10-CM

## 2024-01-30 DIAGNOSIS — F51.09 OTHER INSOMNIA NOT DUE TO A SUBSTANCE OR KNOWN PHYSIOLOGICAL CONDITION: ICD-10-CM

## 2024-01-30 PROCEDURE — 1159F MED LIST DOCD IN RCRD: CPT | Mod: CPTII,S$GLB,, | Performed by: PHYSICIAN ASSISTANT

## 2024-01-30 PROCEDURE — 3075F SYST BP GE 130 - 139MM HG: CPT | Mod: CPTII,S$GLB,, | Performed by: PHYSICIAN ASSISTANT

## 2024-01-30 PROCEDURE — 3079F DIAST BP 80-89 MM HG: CPT | Mod: CPTII,S$GLB,, | Performed by: PHYSICIAN ASSISTANT

## 2024-01-30 PROCEDURE — 1160F RVW MEDS BY RX/DR IN RCRD: CPT | Mod: CPTII,S$GLB,, | Performed by: PHYSICIAN ASSISTANT

## 2024-01-30 PROCEDURE — 99999 PR PBB SHADOW E&M-EST. PATIENT-LVL IV: CPT | Mod: PBBFAC,,, | Performed by: PHYSICIAN ASSISTANT

## 2024-01-30 PROCEDURE — 99204 OFFICE O/P NEW MOD 45 MIN: CPT | Mod: S$GLB,,, | Performed by: PHYSICIAN ASSISTANT

## 2024-01-30 PROCEDURE — 3288F FALL RISK ASSESSMENT DOCD: CPT | Mod: CPTII,S$GLB,, | Performed by: PHYSICIAN ASSISTANT

## 2024-01-30 PROCEDURE — 1126F AMNT PAIN NOTED NONE PRSNT: CPT | Mod: CPTII,S$GLB,, | Performed by: PHYSICIAN ASSISTANT

## 2024-01-30 PROCEDURE — 1101F PT FALLS ASSESS-DOCD LE1/YR: CPT | Mod: CPTII,S$GLB,, | Performed by: PHYSICIAN ASSISTANT

## 2024-01-30 NOTE — PROGRESS NOTES
Referred by Self, Aaareferral     NEW PATIENT VISIT    Alejandro Wayne  is a pleasant 78 y.o. male  with PMH significant for HTN, high cholesterol, Crohn's disease, JACQUE, BPH, chronic rhinitis, hx BCC, hx SCC who presents for sleep evaluation         C/o disruptive snoring for many years. States wife sleeps with headphones. States he has had a few episodes of waking from sleep with SOB, SOB resolves quickly after sitting. Does also report disrupted sleep. [facility] PCP recommended evaluation for CHING, which is why he presents today.      Past Medical History:   Diagnosis Date    Basal cell carcinoma     BPH (benign prostatic hyperplasia)     Chronic nonallergic rhinitis 5/28/2020    Colon polyp     Crohn's disease     Hyperlipidemia     Hypertension     Liver cyst 1/11/2016    Prostate nodule     Squamous cell carcinoma     Thyroid nodule 5/28/2020     Patient Active Problem List   Diagnosis    HTN (hypertension)    High cholesterol    Prostate nodule    Benign prostatic hyperplasia with urinary obstruction    Bilateral chronic knee pain    Esophageal dysphagia    Liver cyst    Elevated lipase    Pancreatic insufficiency    Iron deficiency anemia    Thyroid nodule    Chronic nonallergic rhinitis    Crohn's disease of colon without complication    Urinary incontinence without sensory awareness       Current Outpatient Medications:     amLODIPine (NORVASC) 5 MG tablet, TAKE 1 TABLET BY MOUTH EVERY DAY, Disp: 90 tablet, Rfl: 4    ascorbic acid, vitamin C, (VITAMIN C) 1000 MG tablet, Take 1,000 mg by mouth once daily., Disp: , Rfl:     aspirin (ECOTRIN) 81 MG EC tablet, Take 81 mg by mouth once daily., Disp: , Rfl:     cetirizine (ZYRTEC) 10 MG tablet, Take 10 mg by mouth once daily., Disp: , Rfl:     EScitalopram oxalate (LEXAPRO) 10 MG tablet, TAKE 1 TABLET(10 MG) BY MOUTH EVERY DAY FOR ANXIETY OR DEPRESSION, Disp: 90 tablet, Rfl: 3    eszopiclone (LUNESTA) 2 MG Tab, TAKE 1 TABLET BY MOUTH ONCE NIGHTLY, Disp: 90 tablet, Rfl:  "1    fexofenadine (ALLEGRA) 60 MG tablet, Take 60 mg by mouth once daily., Disp: , Rfl:     ibuprofen (ADVIL,MOTRIN) 800 MG tablet, Take 800 mg by mouth every 12 (twelve) hours as needed., Disp: , Rfl:     latanoprost 0.005 % ophthalmic solution, APPLY 1 DROP INTO BOTH EYES, Disp: , Rfl:     lipase-protease-amylase (CREON) 36,000-114,000- 180,000 unit CpDR, Take 3 capsules by mouth 3 (three) times daily with meals. Do not take if skipping meals or not eating, Disp: 1620 capsule, Rfl: 0    mesalamine (LIALDA) 1.2 gram TbEC, Take 4 tablets (4.8 g total) by mouth daily with breakfast., Disp: 360 tablet, Rfl: 3    mirabegron (MYRBETRIQ) 25 mg Tb24 ER tablet, Take 1 tablet (25 mg total) by mouth once daily., Disp: 30 tablet, Rfl: 11    mirabegron (MYRBETRIQ) 25 mg Tb24 ER tablet, TAKE 1 TABLET(25 MG) BY MOUTH EVERY DAY (Patient not taking: Reported on 5/22/2023), Disp: 90 tablet, Rfl: 3    multivitamin (THERAGRAN) per tablet, Take 1 tablet by mouth once daily., Disp: , Rfl:     pantoprazole (PROTONIX) 20 MG tablet, TAKE 1 TABLET(20 MG) BY MOUTH BEFORE BREAKFAST, Disp: 90 tablet, Rfl: 3    rosuvastatin (CRESTOR) 20 MG tablet, TAKE 1 TABLET(20 MG) BY MOUTH EVERY DAY, Disp: 90 tablet, Rfl: 3    tadalafiL (CIALIS) 20 MG Tab, Take 1 tablet (20 mg total) by mouth daily as needed (as needed)., Disp: 10 tablet, Rfl: 11     There were no vitals filed for this visit.    Physical Exam:    GEN:   Well-appearing  Psych:  Appropriate affect, demonstrates insight  SKIN:  No rash on the face or bridge of the nose      LABS:   No results found for: "HGB", "CO2"      RECORDS REVIEWED:    No previous sleep study    ASSESSMENT        1/29/2024     1:32 PM   EPWORTH SLEEPINESS SCALE   Sitting and reading 1   Watching TV 1   Sitting, inactive in a public place (e.g. a theatre or a meeting) 0   As a passenger in a car for an hour without a break 0   Lying down to rest in the afternoon when circumstances permit 1   Sitting and talking to " someone 0   Sitting quietly after a lunch without alcohol 0   In a car, while stopped for a few minutes in traffic 0   Total score 3       PROBLEM DESCRIPTION/ Sx on Presentation  STATUS   sx CHING   + disruptive snoring, + wakes with SOB, denies witnessed apneas    New   Daytime Sx   occasional sleepiness when inactive   ESS 3/24 on intake  New   Insomnia   Trouble falling asleep: sometimes  Arousals:         disrupted   Hard to get back to sleep?:     Prior pertinent medications:  Current pertinent medications:   Lunesta 2mg  New   Other issues:     PLAN     -recommend sleep testing   -HST ordered  -discussed trial therapy if CHING present and the patient is open to a trial of CPAP therapy   -discussed CHING with patient in detail, including possible complications of untreated CHING like heart attack/stroke  -advised on strict driving precautions; advised never to drive drowsy    Advised on plan of care. Answered all patient questions. Patient verbalized understanding and voiced agreement with plan of care.       RTC if dx of CHING made and CPAP ordered, will need follow up 31-90 days after receiving machine for compliance      The patient was given open opportunity to ask questions and/or express concerns about treatment plan. All questions/concerns were discussed.     Two patient identifiers used prior to evaluation.

## 2024-01-31 ENCOUNTER — TELEPHONE (OUTPATIENT)
Dept: SLEEP MEDICINE | Facility: OTHER | Age: 79
End: 2024-01-31
Payer: COMMERCIAL

## 2024-02-07 ENCOUNTER — TELEPHONE (OUTPATIENT)
Dept: SLEEP MEDICINE | Facility: OTHER | Age: 79
End: 2024-02-07
Payer: COMMERCIAL

## 2024-03-12 ENCOUNTER — TELEPHONE (OUTPATIENT)
Dept: SLEEP MEDICINE | Facility: OTHER | Age: 79
End: 2024-03-12
Payer: COMMERCIAL

## 2024-03-13 ENCOUNTER — TELEPHONE (OUTPATIENT)
Dept: SLEEP MEDICINE | Facility: OTHER | Age: 79
End: 2024-03-13
Payer: COMMERCIAL

## 2024-03-18 ENCOUNTER — TELEPHONE (OUTPATIENT)
Dept: SLEEP MEDICINE | Facility: OTHER | Age: 79
End: 2024-03-18
Payer: COMMERCIAL

## 2024-03-19 ENCOUNTER — HOSPITAL ENCOUNTER (OUTPATIENT)
Dept: SLEEP MEDICINE | Facility: OTHER | Age: 79
Discharge: HOME | End: 2024-03-19
Attending: PHYSICIAN ASSISTANT
Payer: COMMERCIAL

## 2024-03-19 DIAGNOSIS — R06.83 SNORING: ICD-10-CM

## 2024-03-19 DIAGNOSIS — F51.09 OTHER INSOMNIA NOT DUE TO A SUBSTANCE OR KNOWN PHYSIOLOGICAL CONDITION: ICD-10-CM

## 2024-03-19 PROCEDURE — 95800 SLP STDY UNATTENDED: CPT

## 2024-03-20 PROBLEM — R06.83 SNORING: Status: ACTIVE | Noted: 2024-03-20

## 2024-03-22 PROCEDURE — 95806 SLEEP STUDY UNATT&RESP EFFT: CPT | Mod: 26,,, | Performed by: INTERNAL MEDICINE

## 2024-03-28 ENCOUNTER — PATIENT MESSAGE (OUTPATIENT)
Dept: SLEEP MEDICINE | Facility: CLINIC | Age: 79
End: 2024-03-28
Payer: COMMERCIAL

## 2024-04-01 ENCOUNTER — TELEPHONE (OUTPATIENT)
Dept: SLEEP MEDICINE | Facility: OTHER | Age: 79
End: 2024-04-01

## 2024-04-02 DIAGNOSIS — K50.10 CROHN'S DISEASE OF COLON WITHOUT COMPLICATION: Primary | ICD-10-CM

## 2024-04-02 RX ORDER — MESALAMINE 1.2 G/1
4.8 TABLET, DELAYED RELEASE ORAL
Qty: 360 TABLET | Refills: 3 | Status: SHIPPED | OUTPATIENT
Start: 2024-04-02 | End: 2024-05-20 | Stop reason: SDUPTHER

## 2024-04-02 NOTE — TELEPHONE ENCOUNTER
"IBD medications Lialda 1.2 gm Take 4 tablets ( 4.8 gm ) QD    Refill request for  Lialda 1.2 gm Take 4 tablets ( 4.8 gm ) QD    Allergies reviewed Yes    Drug Monitoring labs/frequency for all IBD meds:    CBC / CMP yearly    Lab Results   Component Value Date    HEPBSAG Negative 11/23/2015     Lab Results   Component Value Date    TBGOLDPLUS Negative 10/19/2020     No results found for: "QUANTNILVALU", "QUANTIFERON", "QUANTTBGDPL"   Lab Results   Component Value Date    QYBSOGCB80SC 36 04/29/2022    XYKDRTCT75 627 04/29/2022     Lab Results   Component Value Date    WBC 7.11 04/11/2023    HGB 13.6 (L) 04/11/2023    HCT 42.3 04/11/2023    MCV 91 04/11/2023     04/11/2023     Lab Results   Component Value Date    CREATININE 0.8 04/11/2023    ALBUMIN 4.0 04/11/2023    BILITOT 0.6 04/11/2023    ALKPHOS 61 04/11/2023    AST 27 04/11/2023    ALT 24 04/11/2023       Lab due date (mo/yr):  4/2024    Labs scheduled: no     Next appt: 5/20/2024    RX refill sent to provider for amount until next labs: Yes     "

## 2024-04-08 ENCOUNTER — PATIENT MESSAGE (OUTPATIENT)
Dept: UROLOGY | Facility: CLINIC | Age: 79
End: 2024-04-08
Payer: COMMERCIAL

## 2024-04-08 DIAGNOSIS — N32.81 OAB (OVERACTIVE BLADDER): ICD-10-CM

## 2024-04-08 RX ORDER — MIRABEGRON 25 MG/1
TABLET, FILM COATED, EXTENDED RELEASE ORAL
Qty: 30 TABLET | OUTPATIENT
Start: 2024-04-08

## 2024-04-08 NOTE — TELEPHONE ENCOUNTER
Called pt in regards to message in portal regarding refill. I informed pt that he would have to see Dr Lopez before provider can refill his medication. I also informed pt that we can do in office or do a virtual visit. Pt asked to do virtual visit. First available appt is 4/10/24 at 4:20 pm. Pt accepted this time and date.

## 2024-04-10 ENCOUNTER — OFFICE VISIT (OUTPATIENT)
Dept: UROLOGY | Facility: CLINIC | Age: 79
End: 2024-04-10
Payer: COMMERCIAL

## 2024-04-10 DIAGNOSIS — N32.81 OVERACTIVE BLADDER: Primary | ICD-10-CM

## 2024-04-10 PROCEDURE — 99214 OFFICE O/P EST MOD 30 MIN: CPT | Mod: 95,,, | Performed by: UROLOGY

## 2024-04-11 RX ORDER — MIRABEGRON 50 MG/1
50 TABLET, EXTENDED RELEASE ORAL DAILY
Qty: 90 TABLET | Refills: 3 | Status: SHIPPED | OUTPATIENT
Start: 2024-04-11 | End: 2025-04-11

## 2024-04-11 NOTE — PROGRESS NOTES
[facility]        Overactive Bladder (OAB) Return Note     4/11/2024     Referred by:  Toñito Herman MD     HPI: Alejandro Wayne is a very pleasant 78 y.o. male who returns referred for evaluation of urinary incontinence of 6 months duration. He is much improved since starting Myrbetriq 25 mg 2 years ago. He reports no voiding difficulty. He is no longer taking tamsulosin. He is generally very satisfied with therapy, though he does note some residual frequency and rare urgency incontinence. He has had no recurrence of gross hematuria since his normal evaluation in 2021.      He has a history of fecal incontinence in the past but this has resolved with pharmacologic therapy including fiber.      Previous incontinence therapies:  Myrbetriq 15 mg 2 years - mostly resolved with some residual frequency     A review of 10+ systems was conducted with pertinent positive and negative findings documented in HPI with all other systems reviewed and negative.     Past medical, family, surgical and social history reviewed as documented in chart with pertinent positive medical, family, surgical and social history detailed in HPI.     Exam Findings:     Const: no acute distress, conversant and alert  Eyes: anicteric, extraocular muscles intact  ENMT: normocephalic, Nl oral membranes  Cardio: no cyanosis, nl cap refill  Pulm: no tachypnea; no resp distress  Musc: no laceration, no tenderness  Neuro: alert; oriented x 3  Skin: warm, dry; no petichiae  Psych: no anxiety; normal speech      Assessment/Plan:     Overactive Bladder with Urgency Incontinence (established, not meeting goals): While his symptoms are quite improved, he would be interested in trying to increase the Myrbetriq from 25 mg to 50 mg.     Myrbetriq 50 mg  Return in one year (virtual visit would be fine for this).     The patient location is: Home  The chief complaint leading to consultation is: OAB    Visit type: audiovisual    Face to Face  time with patient: 7 minutes  20 minutes of total time spent on the encounter, which includes face to face time and non-face to face time preparing to see the patient (eg, review of tests), Obtaining and/or reviewing separately obtained history, Documenting clinical information in the electronic or other health record, Independently interpreting results (not separately reported) and communicating results to the patient/family/caregiver, or Care coordination (not separately reported).         Each patient to whom he or she provides medical services by telemedicine is:  (1) informed of the relationship between the physician and patient and the respective role of any other health care provider with respect to management of the patient; and (2) notified that he or she may decline to receive medical services by telemedicine and may withdraw from such care at any time.    Notes:

## 2024-04-15 ENCOUNTER — PATIENT MESSAGE (OUTPATIENT)
Dept: GASTROENTEROLOGY | Facility: CLINIC | Age: 79
End: 2024-04-15
Payer: COMMERCIAL

## 2024-04-15 DIAGNOSIS — K86.81 EXOCRINE PANCREATIC INSUFFICIENCY: ICD-10-CM

## 2024-04-15 DIAGNOSIS — Z79.899 ENCOUNTER FOR MONITORING LONG-TERM PROTON PUMP INHIBITOR THERAPY: ICD-10-CM

## 2024-04-15 DIAGNOSIS — Z51.81 ENCOUNTER FOR MONITORING LONG-TERM PROTON PUMP INHIBITOR THERAPY: ICD-10-CM

## 2024-04-15 DIAGNOSIS — R19.5 LOOSE STOOLS: Primary | ICD-10-CM

## 2024-04-15 RX ORDER — PANCRELIPASE 36000; 180000; 114000 [USP'U]/1; [USP'U]/1; [USP'U]/1
CAPSULE, DELAYED RELEASE PELLETS ORAL
Qty: 1,620 CAPSULE | Refills: 0 | OUTPATIENT
Start: 2024-04-15

## 2024-04-15 RX ORDER — PANCRELIPASE 36000; 180000; 114000 [USP'U]/1; [USP'U]/1; [USP'U]/1
3 CAPSULE, DELAYED RELEASE PELLETS ORAL
Qty: 810 CAPSULE | Refills: 1 | Status: SHIPPED | OUTPATIENT
Start: 2024-04-15 | End: 2024-05-20 | Stop reason: SDUPTHER

## 2024-04-22 ENCOUNTER — PATIENT MESSAGE (OUTPATIENT)
Dept: SLEEP MEDICINE | Facility: CLINIC | Age: 79
End: 2024-04-22
Payer: COMMERCIAL

## 2024-04-22 DIAGNOSIS — G47.33 OSA (OBSTRUCTIVE SLEEP APNEA): Primary | ICD-10-CM

## 2024-05-08 DIAGNOSIS — N52.9 ED (ERECTILE DYSFUNCTION) OF ORGANIC ORIGIN: ICD-10-CM

## 2024-05-08 RX ORDER — TADALAFIL 20 MG/1
20 TABLET ORAL DAILY PRN
Qty: 10 TABLET | Refills: 11 | OUTPATIENT
Start: 2024-05-08

## 2024-05-20 ENCOUNTER — OFFICE VISIT (OUTPATIENT)
Dept: GASTROENTEROLOGY | Facility: CLINIC | Age: 79
End: 2024-05-20
Payer: COMMERCIAL

## 2024-05-20 VITALS
DIASTOLIC BLOOD PRESSURE: 78 MMHG | BODY MASS INDEX: 26.64 KG/M2 | SYSTOLIC BLOOD PRESSURE: 132 MMHG | HEIGHT: 67 IN | TEMPERATURE: 98 F | WEIGHT: 169.75 LBS | HEART RATE: 69 BPM | OXYGEN SATURATION: 93 %

## 2024-05-20 DIAGNOSIS — K86.81 EXOCRINE PANCREATIC INSUFFICIENCY: ICD-10-CM

## 2024-05-20 DIAGNOSIS — Z51.81 ENCOUNTER FOR MONITORING LONG-TERM PROTON PUMP INHIBITOR THERAPY: ICD-10-CM

## 2024-05-20 DIAGNOSIS — K50.10 SEGMENTAL COLITIS ASSOCIATED WITH DIVERTICULOSIS: ICD-10-CM

## 2024-05-20 DIAGNOSIS — K50.10 CROHN'S DISEASE OF COLON WITHOUT COMPLICATION: Primary | ICD-10-CM

## 2024-05-20 DIAGNOSIS — Z79.899 ENCOUNTER FOR MONITORING LONG-TERM PROTON PUMP INHIBITOR THERAPY: ICD-10-CM

## 2024-05-20 DIAGNOSIS — K57.30 SEGMENTAL COLITIS ASSOCIATED WITH DIVERTICULOSIS: ICD-10-CM

## 2024-05-20 DIAGNOSIS — K86.89 PANCREATIC INSUFFICIENCY: ICD-10-CM

## 2024-05-20 DIAGNOSIS — Z00.00 ROUTINE ADULT HEALTH MAINTENANCE: ICD-10-CM

## 2024-05-20 PROCEDURE — 3075F SYST BP GE 130 - 139MM HG: CPT | Mod: CPTII,S$GLB,, | Performed by: INTERNAL MEDICINE

## 2024-05-20 PROCEDURE — 1101F PT FALLS ASSESS-DOCD LE1/YR: CPT | Mod: CPTII,S$GLB,, | Performed by: INTERNAL MEDICINE

## 2024-05-20 PROCEDURE — 3288F FALL RISK ASSESSMENT DOCD: CPT | Mod: CPTII,S$GLB,, | Performed by: INTERNAL MEDICINE

## 2024-05-20 PROCEDURE — 1159F MED LIST DOCD IN RCRD: CPT | Mod: CPTII,S$GLB,, | Performed by: INTERNAL MEDICINE

## 2024-05-20 PROCEDURE — G2211 COMPLEX E/M VISIT ADD ON: HCPCS | Mod: S$GLB,,, | Performed by: INTERNAL MEDICINE

## 2024-05-20 PROCEDURE — 3078F DIAST BP <80 MM HG: CPT | Mod: CPTII,S$GLB,, | Performed by: INTERNAL MEDICINE

## 2024-05-20 PROCEDURE — 1126F AMNT PAIN NOTED NONE PRSNT: CPT | Mod: CPTII,S$GLB,, | Performed by: INTERNAL MEDICINE

## 2024-05-20 PROCEDURE — 99214 OFFICE O/P EST MOD 30 MIN: CPT | Mod: S$GLB,,, | Performed by: INTERNAL MEDICINE

## 2024-05-20 PROCEDURE — 1160F RVW MEDS BY RX/DR IN RCRD: CPT | Mod: CPTII,S$GLB,, | Performed by: INTERNAL MEDICINE

## 2024-05-20 RX ORDER — MESALAMINE 1.2 G/1
4.8 TABLET, DELAYED RELEASE ORAL
Qty: 360 TABLET | Refills: 3 | Status: SHIPPED | OUTPATIENT
Start: 2024-05-20 | End: 2025-05-15

## 2024-05-20 RX ORDER — PANCRELIPASE 36000; 180000; 114000 [USP'U]/1; [USP'U]/1; [USP'U]/1
3 CAPSULE, DELAYED RELEASE PELLETS ORAL
Qty: 810 CAPSULE | Refills: 3 | Status: SHIPPED | OUTPATIENT
Start: 2024-05-20 | End: 2025-05-15

## 2024-05-20 NOTE — PROGRESS NOTES
[facility]          Inflammatory Bowel Disease Follow Up Note         TODAY'S VISIT DATE:  5/20/2024    Reason for Consult:    Chief Complaint   Patient presents with    Crohn's Disease       PCP: Edward Sheppard      Referring MD:   No ref. provider found    History of Present Illness:  Alejandro Wayne who is a 79 y.o. male is being seen today at the [facility] on 05/20/2024 for inflammatory bowel disease- Crohn's disease.  He is feeling well.  He is having 3-4 formed bowel movements daily.  He denies any abdominal pain, blood in his stools, fevers, chills.  He denies any problems with his medications.  He continues to take Creon before each meal as well as mesalamine daily.  He denies any other issues today.  He is scheduled for knee replacement in August.    IBD History:  He has had several years of intermittent gastrointestinal problems.  He was initially seen by Dr. Mathews in November of 2015 with complaints of chronic diarrhea.  At that time he was found to have a low fecal elastase level consistent with pancreatic insufficiency.  Endoscopic ultrasound was performed and found changes consistent with mild chronic pancreatitis.  He has a history of chronic alcohol use.  A CT scan was also performed around that time which showed changes consistent with diverticulitis.  In June of 2016 he underwent a screening colonoscopy which revealed a normal terminal ileum, multiple diverticula in the sigmoid, descending, transverse, and ascending colon.  There was also some moderately erythematous mucosa in the mid sigmoid and distal sigmoid colon and biopsies were taken which did not show any significant features.  In October 2018 he had a repeat CT scan done because of abdominal pain and he was again found to have multiple diverticula as well as some luminal narrowing and wall thickening and changes consistent with sigmoid diverticulitis.  In October  2019 he underwent an upper endoscopy and colonoscopy because of iron deficiency anemia and diarrhea.  The upper endoscopy revealed a small hiatal hernia but was otherwise normal.  Colonoscopy revealed a normal terminal ileum, multiple diverticula, moderately erythematous mucosa in the sigmoid colon with purulent exudate consistent with acute diverticulitis.  A CT scan again revealed changes with diverticulitis.  In November 2019 he was seen in follow-up by Colorectal surgery and a partial colectomy was recommended because of recurrent diverticulitis.  This was delayed due to family medical issues.  In May of this year he presented to Colorectal surgery Clinic again with complaints of loose stools.  A repeat CT scan showed finding similar to all of his previous CT scans.  He underwent colonoscopy on October 5, 2020 because of ongoing diarrhea issues and was found to have inflamed ulcerated mucosa in the anus and rectum, sigmoid colon, distal descending colon.  The more proximal colon appeared normal.  There was also a mild anal stricture.  Biopsies from this colonoscopy revealed a normal terminal ileum.  Biopsies of the right colon revealed chronic and superficial acute colitis.  Left colon biopsies revealed chronic colitis as well as at least 1 rectal biopsy revealing an intramucosal granuloma.  He was started on Lialda in November 2020. He had a significant improvement after starting Lialda but at his 1st follow-up he was noted only be taking 1.2 g daily.  We increase the dose at that time to 2.4 g daily.  In March 2021 he had a continued elevation of his fecal calprotectin level so the dose was increased to 4.8 g daily.  His CRP had normalized.  A repeat stool calprotectin level in July 2021 showed a significant decline in the stool calprotectin.  Follow-up colonoscopy in August 2022 showed resolution of the rectal inflammation and improvement in the sigmoid and descending colon inflammatory changes but still  ongoing inflammation suspicious for segmental colitis associated with diverticular disease.  Biopsies did not definitively demonstrate typical findings of IBD in the sigmoid and descending colon.  No adjustments to his therapy were made as he was asymptomatic.    IBD Details:  Dx Date: 10/5/2020  Disease type/distribution:  Crohn's disease/colonic involvement (mostly rectum and sigmoid but with some mild descending inflammation endoscopically and more proximal colon involved histologically)  Current Treatment:  Lialda 4.8 g daily  Start Date:  November 2020  Response:  Symptom remission  Optimized:  Yes Adverse reactions:  None  Prior surgeries:  None  CRP Elevation: Y calprotectin:  Markedly elevated with active disease  Disease Complications:  None  Extraintestinal manifestations:  None  Prior treatments:   Steroids:  None  5ASA:  Currently on Lialda  IMM:  None  TNF Inh:  None   Anti-Integrin:  None   IL 12/23:  None  ADDISON Inh:  None    Previous Clinical Trials:  None    Last Colonoscopy:   August 5, 2022-resolution of rectal inflammation, ongoing inflammation in the sigmoid descending colon (biopsies not definitively consistent with IBD).  October 5, 2020-moderate inflammation of the rectum with less severe inflammation of the sigmoid and descending colon.  Remainder of the colon was normal appearing other diverticular disease.  Biopsies with pan colitis.  Ileum was normal and biopsies of the ileum were normal.    Other Endoscopies:   August 5, 2022-EGD for dysphagia with no significant esophageal stenosis.  Multiple previous colonoscopy reports reviewed with findings of inflammation in the sigmoid colon most consistent with segmental colitis associated with diverticular disease verses diverticulitis    Imaging:   MRE:  None   CT:  May 2020-no evidence of small-bowel inflammation   Other:  None    Pertinent Labs:  Lab Results   Component Value Date    SEDRATE 16 (H) 10/08/2018    CRP 5.9 07/29/2022     Lab  "Results   Component Value Date    TTGIGA 9 05/04/2020     05/04/2020     Lab Results   Component Value Date    TSH 1.965 05/04/2020     Lab Results   Component Value Date    HGGGTHOO06RG 36 04/29/2022    MJSCJVRU87 627 04/29/2022     Lab Results   Component Value Date    HEPBSAG Negative 11/23/2015    HEPCAB Negative 02/07/2019     No results found for: "NMD68DPPE"  Lab Results   Component Value Date    NIL 0.050 10/19/2020    MITOGENNIL 3.840 10/19/2020     No results found for: "TPTMINTERP", "TPMTRESULT"  Lab Results   Component Value Date    STOOLCULTURE  05/15/2020     No Salmonella,Shigella,Vibrio,Campylobacter,Yersinia isolated.    ZSLIIYMWEA1F Negative 05/15/2020    RTLEDGPLXH7P Negative 05/15/2020    CDIFFICILEAN Negative 05/15/2020    CDIFFTOX Negative 05/15/2020     Lab Results   Component Value Date    CALPROTECTIN 140.5 (H) 07/22/2021       Therapeutic Drug Monitoring Labs:  No results found for: "PROMETH"  No results found for: "ANSADAINIT", "INFLIXIMAB", "INFLIXINTERP"    Vaccinations:  No results found for: "HEPBSAB"  Lab Results   Component Value Date    HEPAIGG Negative 10/19/2020     Lab Results   Component Value Date    VARICELLAZOS 3.11 (H) 03/15/2021    VARICELLAINT Positive (A) 03/15/2021     Immunization History   Administered Date(s) Administered    COVID-19, MRNA, LN-S, PF (Pfizer) (Gray Cap) 04/08/2022    COVID-19, MRNA, LN-S, PF (Pfizer) (Purple Cap) 01/09/2021, 01/30/2021, 09/16/2021    COVID-19, mRNA, LNP-S, bivalent booster, PF (PFIZER OMICRON) 10/17/2022, 04/29/2023    Hepatitis A, Adult 03/01/2016    Influenza (FLUAD) - Quadrivalent - Adjuvanted - PF *Preferred* (65+) 04/08/2022    Influenza - High Dose - PF (65 years and older) 10/15/2013, 09/10/2014, 10/24/2015, 12/13/2016, 10/02/2017, 09/11/2018    Influenza - Quadrivalent 09/18/2019    Influenza - Quadrivalent - High Dose - PF (65 years and older) 08/25/2020, 10/11/2022    Influenza Whole 09/01/2015    Pneumococcal " Conjugate - 13 Valent 03/01/2016, 12/13/2016    Pneumococcal Polysaccharide - 23 Valent 09/10/2014    Tdap 03/01/2016    Zoster 11/10/2014    Zoster Recombinant 12/13/2022, 04/29/2023         Review of Systems  Review of Systems   Constitutional:  Negative for chills, fever and weight loss.   HENT:  Negative for sore throat.    Eyes:  Negative for pain, discharge and redness.   Respiratory:  Negative for cough, shortness of breath and wheezing.    Cardiovascular:  Negative for chest pain and leg swelling.   Gastrointestinal:  Negative for abdominal pain, blood in stool, constipation, diarrhea, heartburn, melena, nausea and vomiting.   Genitourinary:  Negative for dysuria and frequency.   Musculoskeletal:  Negative for back pain, joint pain and myalgias.   Skin:  Negative for rash.   Neurological:  Negative for focal weakness and seizures.   Endo/Heme/Allergies:  Does not bruise/bleed easily.   Psychiatric/Behavioral:  Negative for depression. The patient is not nervous/anxious.        Medical History:   Past Medical History:   Diagnosis Date    Basal cell carcinoma     BPH (benign prostatic hyperplasia)     Chronic nonallergic rhinitis 5/28/2020    Colon polyp     Crohn's disease     Hyperlipidemia     Hypertension     Liver cyst 1/11/2016    Prostate nodule     Squamous cell carcinoma     Thyroid nodule 5/28/2020       Surgical History:  Past Surgical History:   Procedure Laterality Date    APPENDECTOMY      COLONOSCOPY N/A 6/22/2016    Procedure: COLONOSCOPY;  Surgeon: Marco Mathews MD;  Location: [address]);  Service: Endoscopy;  Laterality: N/A;    COLONOSCOPY N/A 10/21/2019    Procedure: COLONOSCOPY;  Surgeon: Marco Mathews MD;  Location: [address]);  Service: Endoscopy;  Laterality: N/A;  First case of the day with me next available     COLONOSCOPY N/A 10/5/2020    Procedure: COLONOSCOPY;  Surgeon: LIZABETH Hall MD;  Location: [address]);  Service: Endoscopy;  Laterality:  N/A;    COLONOSCOPY N/A 8/5/2022    Procedure: COLONOSCOPY;  Surgeon: Aidan Castro MD;  Location: [facility] ENDO (4TH FLR);  Service: Endoscopy;  Laterality: N/A;  Fully Vaccinated. Per  ok to use this date. EC    COLONOSCOPY W/ POLYPECTOMY      ENDOSCOPIC ULTRASOUND OF UPPER GASTROINTESTINAL TRACT N/A 6/24/2019    Procedure: ULTRASOUND, UPPER GI TRACT, ENDOSCOPIC;  Surgeon: Jeremy Saleem MD;  Location: [facility] ENDO (2ND FLR);  Service: Endoscopy;  Laterality: N/A;  For evaluation of low fecal elastase and loose stools and history of nonspecific finding of the EUS of the pancreas    ESOPHAGOGASTRODUODENOSCOPY      ESOPHAGOGASTRODUODENOSCOPY N/A 6/24/2019    Procedure: EGD (ESOPHAGOGASTRODUODENOSCOPY);  Surgeon: Jeremy Saleem MD;  Location: [facility] ENDO (2ND FLR);  Service: Endoscopy;  Laterality: N/A;  For evaluation of iron deficiency anemia    ESOPHAGOGASTRODUODENOSCOPY N/A 10/21/2019    Procedure: EGD (ESOPHAGOGASTRODUODENOSCOPY);  Surgeon: Marco Mathews MD;  Location: [facility] (4TH FLR);  Service: Endoscopy;  Laterality: N/A;  First case of the day with me next available     ESOPHAGOGASTRODUODENOSCOPY N/A 8/5/2022    Procedure: ESOPHAGOGASTRODUODENOSCOPY (EGD);  Surgeon: Aidan Castro MD;  Location: [facility] (4TH FLR);  Service: Endoscopy;  Laterality: N/A;    HERNIA REPAIR      KNEE ARTHROSCOPY W/ DEBRIDEMENT      TONSILLECTOMY, ADENOIDECTOMY      UPPER ENDOSCOPIC ULTRASOUND W/ FNA      VASECTOMY         Family History:   Family History   Problem Relation Name Age of Onset    Cancer Mother          melanoma    Cancer Father          skin cancer    Celiac disease Neg Hx      Cirrhosis Neg Hx      Colon cancer Neg Hx      Colon polyps Neg Hx      Crohn's disease Neg Hx      Esophageal cancer Neg Hx      Inflammatory bowel disease Neg Hx      Irritable bowel syndrome Neg Hx      Liver cancer Neg Hx      Rectal cancer Neg Hx      Stomach cancer Neg Hx      Ulcerative colitis Neg Hx         Social  History:   Social History     Tobacco Use    Smoking status: Former     Current packs/day: 0.00     Average packs/day: 0.5 packs/day for 4.0 years (2.0 ttl pk-yrs)     Types: Cigarettes     Start date: 1962     Quit date: 1966     Years since quittin.0    Smokeless tobacco: Never    Tobacco comments:     as teenager   Substance Use Topics    Alcohol use: Yes     Alcohol/week: 14.0 standard drinks of alcohol     Types: 14 Glasses of wine per week     Comment: social    Drug use: No       Allergies: Reviewed    Home Medications:   Medication List with Changes/Refills   Current Medications    AMLODIPINE (NORVASC) 5 MG TABLET    TAKE 1 TABLET BY MOUTH EVERY DAY    ASCORBIC ACID, VITAMIN C, (VITAMIN C) 1000 MG TABLET    Take 1,000 mg by mouth once daily.    ASPIRIN (ECOTRIN) 81 MG EC TABLET    Take 81 mg by mouth once daily.    CETIRIZINE (ZYRTEC) 10 MG TABLET    Take 10 mg by mouth once daily.    ESCITALOPRAM OXALATE (LEXAPRO) 10 MG TABLET    TAKE 1 TABLET(10 MG) BY MOUTH EVERY DAY FOR ANXIETY OR DEPRESSION    ESZOPICLONE (LUNESTA) 2 MG TAB    Take 1 tablet (2 mg total) by mouth every evening.    FEXOFENADINE (ALLEGRA) 60 MG TABLET    Take 60 mg by mouth once daily.    IBUPROFEN (ADVIL,MOTRIN) 800 MG TABLET    Take 800 mg by mouth every 12 (twelve) hours as needed.    LATANOPROST 0.005 % OPHTHALMIC SOLUTION    APPLY 1 DROP INTO BOTH EYES    MIRABEGRON (MYRBETRIQ) 50 MG TB24    Take 1 tablet (50 mg total) by mouth once daily.    MULTIVITAMIN (THERAGRAN) PER TABLET    Take 1 tablet by mouth once daily.    PANTOPRAZOLE (PROTONIX) 20 MG TABLET    TAKE 1 TABLET(20 MG) BY MOUTH BEFORE BREAKFAST    ROSUVASTATIN (CRESTOR) 20 MG TABLET    TAKE 1 TABLET(20 MG) BY MOUTH EVERY DAY    TADALAFIL (CIALIS) 20 MG TAB    Take 1 tablet (20 mg total) by mouth daily as needed (as needed).   Changed and/or Refilled Medications    Modified Medication Previous Medication    LIPASE-PROTEASE-AMYLASE (CREON) 36,000-114,000- 180,000  "UNIT CPDR lipase-protease-amylase (CREON) 36,000-114,000- 180,000 unit CpDR       Take 3 capsules by mouth 3 (three) times daily with meals. Do not take if skipping meals or not eating    Take 3 capsules by mouth 3 (three) times daily with meals. Do not take if skipping meals or not eating    MESALAMINE (LIALDA) 1.2 GRAM TBEC mesalamine (LIALDA) 1.2 gram TbEC       Take 4 tablets (4.8 g total) by mouth daily with breakfast.    Take 4 tablets (4.8 g total) by mouth daily with breakfast.       Physical Exam:  Vital Signs:  /78 (BP Location: Left arm, Patient Position: Sitting, BP Method: Medium (Automatic))   Pulse 69   Temp 97.7 °F (36.5 °C) (Temporal)   Ht 5' 7" (1.702 m)   Wt 77 kg (169 lb 12.1 oz)   SpO2 (!) 93%   BMI 26.59 kg/m²   Body mass index is 26.59 kg/m².    Physical Exam  Vitals and nursing note reviewed.   Constitutional:       General: He is not in acute distress.     Appearance: Normal appearance. He is well-developed. He is not ill-appearing or toxic-appearing.   HENT:      Head: Normocephalic and atraumatic.   Eyes:      General: No scleral icterus.     Pupils: Pupils are equal, round, and reactive to light.   Neck:      Thyroid: No thyromegaly.   Cardiovascular:      Rate and Rhythm: Normal rate and regular rhythm.      Heart sounds: No murmur heard.     No friction rub.   Pulmonary:      Effort: Pulmonary effort is normal.      Breath sounds: Normal breath sounds. No wheezing or rales.   Abdominal:      General: Bowel sounds are normal. There is no distension.      Palpations: Abdomen is soft. There is no mass.      Tenderness: There is no abdominal tenderness. There is no guarding or rebound.   Musculoskeletal:      Right lower leg: No edema.      Left lower leg: No edema.   Lymphadenopathy:      Cervical: No cervical adenopathy.   Skin:     Findings: No rash.   Neurological:      General: No focal deficit present.      Mental Status: He is alert and oriented to person, place, and " time.   Psychiatric:         Mood and Affect: Mood normal.         Behavior: Behavior normal.         Thought Content: Thought content normal.         Judgment: Judgment normal.         Labs: reviewed and pertinent noted above    Assessment/Plan:  Alejandro Wanye is a 79 y.o. male with Crohn's colitis. The following issues were addresssed:    1. Crohn's disease of colon without complication    2. Pancreatic insufficiency    3. Segmental colitis associated with diverticulosis    4. Routine adult health maintenance    5. Encounter for monitoring long-term proton pump inhibitor therapy    6. Exocrine pancreatic insufficiency    7. Overactive bladder      1.  Crohn's disease:  He continues to do well.  I recommend that he continue Lialda.  Plan for colonoscopy next year.  Update labs in the near future.    2.  Pancreatic insufficiency:  Continue Creon.    3. Segmental colitis associated with diverticulosis: Continue mesalamine.  Asymptomatic.  Continue to monitor.    4. Health maintenance: Check PSA and lipid panel with next labs.      Ex smoker:  - recommended to continue smoking cessation  - discussed in detail that Crohn's disease can worsen with smoking and may make patient more resistant to treatment    # IBD specific health maintenance:  Colon cancer surveillance:  Up-to-date    Annual:  - Eye exam:  Discuss in the future  - Skin exam (if on IMM/TNF):  Scheduled  - reminded pt to use sunblock/hats/sunprotective clothing  - PAP (if immunosuppressed):  Not applicable    DEXA:  Discuss in the future-recommended due to age    Vitamin D:  Normal    Vaccines:    Influenza:  Up-to-date   Pneumovax:     PCV13:  Up-to-date    PSV23:  Up-to-date   HAV:  Discuss vaccination in the future   HBV:  Discuss vaccination in the future   Tdap:  Up-to-date   MMR:  Up-to-date   VZV:  Immune   HZV:  Ordered at previous visit   HPV:  Not applicable   Meningococcus:  Not applicable   COVID:  Vaccinated    Follow up: Follow up in about 1  year (around 5/20/2025).    Visit today is associated with current or anticipated ongoing medical care related to this patient's single serious condition/complex condition (Crohn's disease).      Thank you again for sending Alejandro Wayne to see Dr. Yury Castro today at the [facility]. Please don't hesitate to contact Dr. Castro if there are any questions regarding this evaluation, or if you have any other patients with inflammatory bowel disease for whom you would like a consultation. You can reach Dr. Castro at [phone] or by email at [email or web address]    Aidan Castro MD  Department of Gastroenterology  Inflammatory Bowel Disease

## 2024-05-20 NOTE — PROGRESS NOTES
IBD PATIENT INTAKE:    Confirm current PCP: Edward Sheppard  If no PCP-  number given to establish [phone]: N/A    IBD THERAPY (name, dose/frequency): Mesalamine (Lialda) 4.8g daily    Antibiotics (past 30 Days):  No  If yes   Indication:  Name of antibiotic:  Completion date:

## 2024-05-22 ENCOUNTER — LAB VISIT (OUTPATIENT)
Dept: LAB | Facility: OTHER | Age: 79
End: 2024-05-22
Attending: INTERNAL MEDICINE
Payer: COMMERCIAL

## 2024-05-22 DIAGNOSIS — R19.5 LOOSE STOOLS: ICD-10-CM

## 2024-05-22 DIAGNOSIS — K50.10 CROHN'S DISEASE OF COLON WITHOUT COMPLICATION: ICD-10-CM

## 2024-05-22 DIAGNOSIS — Z00.00 ROUTINE ADULT HEALTH MAINTENANCE: ICD-10-CM

## 2024-05-22 LAB
25(OH)D3+25(OH)D2 SERPL-MCNC: 38 NG/ML (ref 30–96)
ALBUMIN SERPL BCP-MCNC: 4.2 G/DL (ref 3.5–5.2)
ALP SERPL-CCNC: 74 U/L (ref 55–135)
ALT SERPL W/O P-5'-P-CCNC: 33 U/L (ref 10–44)
ANION GAP SERPL CALC-SCNC: 7 MMOL/L (ref 8–16)
AST SERPL-CCNC: 27 U/L (ref 10–40)
BASOPHILS # BLD AUTO: 0.08 K/UL (ref 0–0.2)
BASOPHILS NFR BLD: 1.4 % (ref 0–1.9)
BILIRUB SERPL-MCNC: 0.4 MG/DL (ref 0.1–1)
BUN SERPL-MCNC: 13 MG/DL (ref 8–23)
CALCIUM SERPL-MCNC: 9 MG/DL (ref 8.7–10.5)
CHLORIDE SERPL-SCNC: 104 MMOL/L (ref 95–110)
CHOLEST SERPL-MCNC: 112 MG/DL (ref 120–199)
CHOLEST/HDLC SERPL: 1.7 {RATIO} (ref 2–5)
CO2 SERPL-SCNC: 24 MMOL/L (ref 23–29)
COMPLEXED PSA SERPL-MCNC: 2.4 NG/ML (ref 0–4)
CREAT SERPL-MCNC: 0.8 MG/DL (ref 0.5–1.4)
CRP SERPL-MCNC: 4.6 MG/L (ref 0–8.2)
DIFFERENTIAL METHOD BLD: ABNORMAL
EOSINOPHIL # BLD AUTO: 0.2 K/UL (ref 0–0.5)
EOSINOPHIL NFR BLD: 3.5 % (ref 0–8)
ERYTHROCYTE [DISTWIDTH] IN BLOOD BY AUTOMATED COUNT: 12.9 % (ref 11.5–14.5)
EST. GFR  (NO RACE VARIABLE): >60 ML/MIN/1.73 M^2
GLUCOSE SERPL-MCNC: 122 MG/DL (ref 70–110)
HCT VFR BLD AUTO: 41.8 % (ref 40–54)
HDLC SERPL-MCNC: 65 MG/DL (ref 40–75)
HDLC SERPL: 58 % (ref 20–50)
HGB BLD-MCNC: 13.8 G/DL (ref 14–18)
IMM GRANULOCYTES # BLD AUTO: 0.02 K/UL (ref 0–0.04)
IMM GRANULOCYTES NFR BLD AUTO: 0.4 % (ref 0–0.5)
LDLC SERPL CALC-MCNC: 39.8 MG/DL (ref 63–159)
LIPASE SERPL-CCNC: 26 U/L (ref 4–60)
LYMPHOCYTES # BLD AUTO: 0.7 K/UL (ref 1–4.8)
LYMPHOCYTES NFR BLD: 11.8 % (ref 18–48)
MCH RBC QN AUTO: 29.3 PG (ref 27–31)
MCHC RBC AUTO-ENTMCNC: 33 G/DL (ref 32–36)
MCV RBC AUTO: 89 FL (ref 82–98)
MONOCYTES # BLD AUTO: 0.7 K/UL (ref 0.3–1)
MONOCYTES NFR BLD: 12.2 % (ref 4–15)
NEUTROPHILS # BLD AUTO: 4 K/UL (ref 1.8–7.7)
NEUTROPHILS NFR BLD: 70.7 % (ref 38–73)
NONHDLC SERPL-MCNC: 47 MG/DL
NRBC BLD-RTO: 0 /100 WBC
PLATELET # BLD AUTO: 256 K/UL (ref 150–450)
PMV BLD AUTO: 9.3 FL (ref 9.2–12.9)
POTASSIUM SERPL-SCNC: 4 MMOL/L (ref 3.5–5.1)
PROT SERPL-MCNC: 7.2 G/DL (ref 6–8.4)
RBC # BLD AUTO: 4.71 M/UL (ref 4.6–6.2)
SODIUM SERPL-SCNC: 135 MMOL/L (ref 136–145)
TRIGL SERPL-MCNC: 36 MG/DL (ref 30–150)
VIT B12 SERPL-MCNC: 572 PG/ML (ref 210–950)
WBC # BLD AUTO: 5.67 K/UL (ref 3.9–12.7)

## 2024-05-22 PROCEDURE — 80061 LIPID PANEL: CPT | Performed by: INTERNAL MEDICINE

## 2024-05-22 PROCEDURE — 84597 ASSAY OF VITAMIN K: CPT | Performed by: INTERNAL MEDICINE

## 2024-05-22 PROCEDURE — 82306 VITAMIN D 25 HYDROXY: CPT | Performed by: INTERNAL MEDICINE

## 2024-05-22 PROCEDURE — 84446 ASSAY OF VITAMIN E: CPT | Performed by: INTERNAL MEDICINE

## 2024-05-22 PROCEDURE — 84590 ASSAY OF VITAMIN A: CPT | Performed by: INTERNAL MEDICINE

## 2024-05-22 PROCEDURE — 85025 COMPLETE CBC W/AUTO DIFF WBC: CPT | Performed by: INTERNAL MEDICINE

## 2024-05-22 PROCEDURE — 86140 C-REACTIVE PROTEIN: CPT | Performed by: INTERNAL MEDICINE

## 2024-05-22 PROCEDURE — 80053 COMPREHEN METABOLIC PANEL: CPT | Performed by: INTERNAL MEDICINE

## 2024-05-22 PROCEDURE — 83690 ASSAY OF LIPASE: CPT | Performed by: INTERNAL MEDICINE

## 2024-05-22 PROCEDURE — 84153 ASSAY OF PSA TOTAL: CPT | Performed by: INTERNAL MEDICINE

## 2024-05-22 PROCEDURE — 82607 VITAMIN B-12: CPT | Performed by: INTERNAL MEDICINE

## 2024-05-22 PROCEDURE — 36415 COLL VENOUS BLD VENIPUNCTURE: CPT | Performed by: INTERNAL MEDICINE

## 2024-05-27 LAB — PHYTONADIONE SERPL-MCNC: 0.86 NMOL/L (ref 0.22–4.88)

## 2024-05-28 LAB
A-TOCOPHEROL VIT E SERPL-MCNC: 1223 UG/DL (ref 500–1800)
VIT A SERPL-MCNC: 48 UG/DL (ref 38–106)

## 2024-10-18 ENCOUNTER — PATIENT MESSAGE (OUTPATIENT)
Dept: DERMATOLOGY | Facility: CLINIC | Age: 79
End: 2024-10-18
Payer: COMMERCIAL

## 2024-10-18 ENCOUNTER — TELEPHONE (OUTPATIENT)
Dept: DERMATOLOGY | Facility: CLINIC | Age: 79
End: 2024-10-18
Payer: COMMERCIAL

## 2024-10-18 DIAGNOSIS — C44.329 SQUAMOUS CELL CARCINOMA OF FOREHEAD: Primary | ICD-10-CM

## 2024-10-18 NOTE — TELEPHONE ENCOUNTER
Pt is referral from Dr. 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 Scheduled for Mohs on 11/12 at 10. Pt confirmed date, time, and location. Reminder sent in mail.

## 2024-10-29 ENCOUNTER — LAB VISIT (OUTPATIENT)
Dept: LAB | Facility: OTHER | Age: 79
End: 2024-10-29
Attending: INTERNAL MEDICINE
Payer: COMMERCIAL

## 2024-10-29 DIAGNOSIS — R89.9 ABNORMAL LABORATORY TEST: ICD-10-CM

## 2024-10-29 LAB — CRP SERPL-MCNC: 1.93 MG/L (ref 0–3.19)

## 2024-10-29 PROCEDURE — 86141 C-REACTIVE PROTEIN HS: CPT | Performed by: INTERNAL MEDICINE

## 2024-10-29 PROCEDURE — 36415 COLL VENOUS BLD VENIPUNCTURE: CPT | Performed by: INTERNAL MEDICINE

## 2024-10-31 ENCOUNTER — PATIENT MESSAGE (OUTPATIENT)
Dept: GASTROENTEROLOGY | Facility: CLINIC | Age: 79
End: 2024-10-31
Payer: COMMERCIAL

## 2024-10-31 ENCOUNTER — HOSPITAL ENCOUNTER (EMERGENCY)
Facility: HOSPITAL | Age: 79
Discharge: LEFT BEFORE DISCHARGE | End: 2024-10-31
Attending: STUDENT IN AN ORGANIZED HEALTH CARE EDUCATION/TRAINING PROGRAM | Admitting: INTERNAL MEDICINE
Payer: COMMERCIAL

## 2024-10-31 VITALS
RESPIRATION RATE: 20 BRPM | TEMPERATURE: 98 F | OXYGEN SATURATION: 97 % | HEIGHT: 67 IN | SYSTOLIC BLOOD PRESSURE: 133 MMHG | DIASTOLIC BLOOD PRESSURE: 86 MMHG | BODY MASS INDEX: 25.11 KG/M2 | HEART RATE: 80 BPM | WEIGHT: 160 LBS

## 2024-10-31 DIAGNOSIS — K92.1 HEMATOCHEZIA: ICD-10-CM

## 2024-10-31 DIAGNOSIS — K92.2 LOWER GI BLEED: ICD-10-CM

## 2024-10-31 DIAGNOSIS — K57.92 DIVERTICULITIS: Primary | ICD-10-CM

## 2024-10-31 LAB
ABO + RH BLD: NORMAL
ALBUMIN SERPL BCP-MCNC: 3.7 G/DL (ref 3.5–5.2)
ALP SERPL-CCNC: 60 U/L (ref 40–150)
ALT SERPL W/O P-5'-P-CCNC: 23 U/L (ref 10–44)
ANION GAP SERPL CALC-SCNC: 7 MMOL/L (ref 8–16)
AST SERPL-CCNC: 22 U/L (ref 10–40)
BASOPHILS # BLD AUTO: 0.05 K/UL (ref 0–0.2)
BASOPHILS NFR BLD: 1.1 % (ref 0–1.9)
BILIRUB SERPL-MCNC: 0.4 MG/DL (ref 0.1–1)
BLD GP AB SCN CELLS X3 SERPL QL: NORMAL
BUN SERPL-MCNC: 16 MG/DL (ref 8–23)
CALCIUM SERPL-MCNC: 8.5 MG/DL (ref 8.7–10.5)
CHLORIDE SERPL-SCNC: 103 MMOL/L (ref 95–110)
CO2 SERPL-SCNC: 23 MMOL/L (ref 23–29)
CREAT SERPL-MCNC: 0.7 MG/DL (ref 0.5–1.4)
DIFFERENTIAL METHOD BLD: ABNORMAL
EOSINOPHIL # BLD AUTO: 0.1 K/UL (ref 0–0.5)
EOSINOPHIL NFR BLD: 2.6 % (ref 0–8)
ERYTHROCYTE [DISTWIDTH] IN BLOOD BY AUTOMATED COUNT: 13.1 % (ref 11.5–14.5)
EST. GFR  (NO RACE VARIABLE): >60 ML/MIN/1.73 M^2
GLUCOSE SERPL-MCNC: 111 MG/DL (ref 70–110)
HCT VFR BLD AUTO: 33.4 % (ref 40–54)
HGB BLD-MCNC: 11 G/DL (ref 14–18)
IMM GRANULOCYTES # BLD AUTO: 0.03 K/UL (ref 0–0.04)
IMM GRANULOCYTES NFR BLD AUTO: 0.6 % (ref 0–0.5)
LYMPHOCYTES # BLD AUTO: 0.4 K/UL (ref 1–4.8)
LYMPHOCYTES NFR BLD: 8.7 % (ref 18–48)
MCH RBC QN AUTO: 30.1 PG (ref 27–31)
MCHC RBC AUTO-ENTMCNC: 32.9 G/DL (ref 32–36)
MCV RBC AUTO: 91 FL (ref 82–98)
MONOCYTES # BLD AUTO: 0.6 K/UL (ref 0.3–1)
MONOCYTES NFR BLD: 11.7 % (ref 4–15)
NEUTROPHILS # BLD AUTO: 3.5 K/UL (ref 1.8–7.7)
NEUTROPHILS NFR BLD: 75.3 % (ref 38–73)
NRBC BLD-RTO: 0 /100 WBC
OHS QRS DURATION: 84 MS
OHS QTC CALCULATION: 424 MS
PLATELET # BLD AUTO: 217 K/UL (ref 150–450)
PMV BLD AUTO: 9.8 FL (ref 9.2–12.9)
POTASSIUM SERPL-SCNC: 3.8 MMOL/L (ref 3.5–5.1)
PROT SERPL-MCNC: 6.2 G/DL (ref 6–8.4)
RBC # BLD AUTO: 3.66 M/UL (ref 4.6–6.2)
SODIUM SERPL-SCNC: 133 MMOL/L (ref 136–145)
SPECIMEN OUTDATE: NORMAL
WBC # BLD AUTO: 4.7 K/UL (ref 3.9–12.7)

## 2024-10-31 PROCEDURE — 80053 COMPREHEN METABOLIC PANEL: CPT

## 2024-10-31 PROCEDURE — 93010 ELECTROCARDIOGRAM REPORT: CPT | Mod: ,,, | Performed by: INTERNAL MEDICINE

## 2024-10-31 PROCEDURE — 86900 BLOOD TYPING SEROLOGIC ABO: CPT

## 2024-10-31 PROCEDURE — 94761 N-INVAS EAR/PLS OXIMETRY MLT: CPT

## 2024-10-31 PROCEDURE — 99284 EMERGENCY DEPT VISIT MOD MDM: CPT | Mod: ,,, | Performed by: STUDENT IN AN ORGANIZED HEALTH CARE EDUCATION/TRAINING PROGRAM

## 2024-10-31 PROCEDURE — 99285 EMERGENCY DEPT VISIT HI MDM: CPT | Mod: 25

## 2024-10-31 PROCEDURE — 93005 ELECTROCARDIOGRAM TRACING: CPT

## 2024-10-31 PROCEDURE — 85025 COMPLETE CBC W/AUTO DIFF WBC: CPT

## 2024-10-31 PROCEDURE — 25500020 PHARM REV CODE 255: Performed by: STUDENT IN AN ORGANIZED HEALTH CARE EDUCATION/TRAINING PROGRAM

## 2024-10-31 RX ORDER — AMOXICILLIN AND CLAVULANATE POTASSIUM 875; 125 MG/1; MG/1
1 TABLET, FILM COATED ORAL 2 TIMES DAILY
Qty: 14 TABLET | Refills: 0 | Status: SHIPPED | OUTPATIENT
Start: 2024-10-31 | End: 2024-11-07

## 2024-10-31 RX ADMIN — IOHEXOL 75 ML: 350 INJECTION, SOLUTION INTRAVENOUS at 10:10

## 2024-10-31 NOTE — ED NOTES
Patient leaving AMA. Dr. Yeung discuss all the risks of leaving AMA. Informed patient of signs and symptoms to look out for. Patient verbalized understanding and is okay with leaving. Witnessed by RN x1

## 2024-10-31 NOTE — ED TRIAGE NOTES
"Assumed care of the patient.Pt on continuous cardiac monitoring, continuous pulse oximetry, and automatic BP cuff cycling Q15min. Pt in hospital gown, side rails up X2, bed low and locked, and call light is placed within reach. One family/visitors at bedside at this time. Pt denies any complaints or needs.       Alejandro Wayne, a 79 y.o. male presents to the ED w/ complaint of rectal bleeding    Patient c/o blood in the stool, states having multiple episodes. Left sided intermittent abdominal pain 5/10 that radiates to the back that comes when needing to have a bowel movement. Denies nausea/vomiting. Denies chest pain and shortness of breath. Patient states, "I called my primary GI doctor and he recommended I come in."     Triage note:  Chief Complaint   Patient presents with    Rectal Bleeding     Pt reports rectal bleeding since yesterday.  Reports 5-6 episodes.       Review of patient's allergies indicates:  No Known Allergies  Past Medical History:   Diagnosis Date    Basal cell carcinoma     BPH (benign prostatic hyperplasia)     Chronic nonallergic rhinitis 5/28/2020    Colon polyp     Crohn's disease     Hyperlipidemia     Hypertension     Liver cyst 1/11/2016    Prostate nodule     Squamous cell carcinoma     Thyroid nodule 5/28/2020       "

## 2024-10-31 NOTE — DISCHARGE INSTRUCTIONS
You presented to the emergency department with complaints of bloody bowel movements.  Imaging of your abdomen was concerning for diverticulitis.  You were recommended to stay for observation admission( to monitor your hemoglobin for necessity of transfusion and repeat episodes of bloody bowel movement) however you elected to follow up outpatient.    You were prescribed Augmentin (antibiotics) we are diverticulitis, please take medication as prescribed.    Please follow-up with your primary care doctor and gastroenterologist outpatient.  Please present back to the emergency department if your symptoms begin to worsen including but not limited to repeat episodes of bloody bowel movement, lightheadedness, dizziness, shortness of breath, and palpitations.

## 2024-10-31 NOTE — Clinical Note
Diagnosis: Lower GI bleed [[id number]]   Future Attending Provider: LUISA MCDONNELL [[id number]]   Is the patient being sent to ED Observation?: No   Special Needs:: No Special Needs [1]

## 2024-10-31 NOTE — ED PROVIDER NOTES
Encounter Date: 10/31/2024       History     Chief Complaint   Patient presents with    Rectal Bleeding     Pt reports rectal bleeding since yesterday.  Reports 5-6 episodes.       79-year-old male with past medical history of Crohn's (on mesalamine), pancreatic insufficiency (on Creon), hypertension, and hyperlipidemia who presents to the emergency department with complaints of rectal bleeding.  Patient reports that he experienced hematochezia yesterday evening followed by 3-4 episode.  Per wife at bedside initial episode was massive with bright red blood.  His after this morning's episode of hematochezia he was advised to come to the emergency department by his gastroenterologist for evaluation and CTA abdomen and pelvis.  He reports 5/10 left lower that is intermittent in nature and relieved after bowel movements.  He denies lightheadedness, dizziness, chest pain, nausea, vomiting, and shortness of breath.    The history is provided by the patient and the spouse. No  was used.     Review of patient's allergies indicates:  No Known Allergies  Past Medical History:   Diagnosis Date    Basal cell carcinoma     BPH (benign prostatic hyperplasia)     Chronic nonallergic rhinitis 5/28/2020    Colon polyp     Crohn's disease     Hyperlipidemia     Hypertension     Liver cyst 1/11/2016    Prostate nodule     Squamous cell carcinoma     Thyroid nodule 5/28/2020     Past Surgical History:   Procedure Laterality Date    APPENDECTOMY      COLONOSCOPY N/A 6/22/2016    Procedure: COLONOSCOPY;  Surgeon: Marco Mathews MD;  Location: [address]);  Service: Endoscopy;  Laterality: N/A;    COLONOSCOPY N/A 10/21/2019    Procedure: COLONOSCOPY;  Surgeon: Marco Mathews MD;  Location: [address]);  Service: Endoscopy;  Laterality: N/A;  First case of the day with me next available     COLONOSCOPY N/A 10/5/2020    Procedure: COLONOSCOPY;  Surgeon: LIZABETH Hall MD;  Location: [facility] ([facility]  FLR);  Service: Endoscopy;  Laterality: N/A;    COLONOSCOPY N/A 8/5/2022    Procedure: COLONOSCOPY;  Surgeon: Aidan Castro MD;  Location: [facility] (4TH FLR);  Service: Endoscopy;  Laterality: N/A;  Fully Vaccinated. Per  ok to use this date. EC    COLONOSCOPY W/ POLYPECTOMY      ENDOSCOPIC ULTRASOUND OF UPPER GASTROINTESTINAL TRACT N/A 6/24/2019    Procedure: ULTRASOUND, UPPER GI TRACT, ENDOSCOPIC;  Surgeon: Jeremy Saleem MD;  Location: [facility] (2ND FLR);  Service: Endoscopy;  Laterality: N/A;  For evaluation of low fecal elastase and loose stools and history of nonspecific finding of the EUS of the pancreas    ESOPHAGOGASTRODUODENOSCOPY      ESOPHAGOGASTRODUODENOSCOPY N/A 6/24/2019    Procedure: EGD (ESOPHAGOGASTRODUODENOSCOPY);  Surgeon: Jeremy Saleem MD;  Location: [facility] (2ND FLR);  Service: Endoscopy;  Laterality: N/A;  For evaluation of iron deficiency anemia    ESOPHAGOGASTRODUODENOSCOPY N/A 10/21/2019    Procedure: EGD (ESOPHAGOGASTRODUODENOSCOPY);  Surgeon: Marco Mathews MD;  Location: [facility] (4TH FLR);  Service: Endoscopy;  Laterality: N/A;  First case of the day with me next available     ESOPHAGOGASTRODUODENOSCOPY N/A 8/5/2022    Procedure: ESOPHAGOGASTRODUODENOSCOPY (EGD);  Surgeon: Aidan Castro MD;  Location: [facility] (4TH FLR);  Service: Endoscopy;  Laterality: N/A;    HERNIA REPAIR      KNEE ARTHROSCOPY W/ DEBRIDEMENT      TONSILLECTOMY, ADENOIDECTOMY      UPPER ENDOSCOPIC ULTRASOUND W/ FNA      VASECTOMY       Family History   Problem Relation Name Age of Onset    Cancer Mother          melanoma    Cancer Father          skin cancer    Celiac disease Neg Hx      Cirrhosis Neg Hx      Colon cancer Neg Hx      Colon polyps Neg Hx      Crohn's disease Neg Hx      Esophageal cancer Neg Hx      Inflammatory bowel disease Neg Hx      Irritable bowel syndrome Neg Hx      Liver cancer Neg Hx      Rectal cancer Neg Hx      Stomach cancer Neg Hx      Ulcerative colitis Neg Hx        Social History     Tobacco Use    Smoking status: Former     Current packs/day: 0.00     Average packs/day: 0.5 packs/day for 4.0 years (2.0 ttl pk-yrs)     Types: Cigarettes     Start date: 1962     Quit date: 1966     Years since quittin.4    Smokeless tobacco: Never    Tobacco comments:     as teenager   Substance Use Topics    Alcohol use: Yes     Alcohol/week: 14.0 standard drinks of alcohol     Types: 14 Glasses of wine per week     Comment: social    Drug use: No     Review of Systems    Physical Exam     Initial Vitals [10/31/24 0821]   BP Pulse Resp Temp SpO2   116/74 92 16 98.1 °F (36.7 °C) 95 %      MAP       --         Physical Exam    Constitutional: He appears well-developed and well-nourished. No distress.   Eyes: Conjunctivae are normal.   Cardiovascular:  Normal rate and regular rhythm.           Pulmonary/Chest: Breath sounds normal. No respiratory distress.   Abdominal: Abdomen is soft. He exhibits no distension. There is no abdominal tenderness. There is no rebound and no guarding.   Genitourinary: Rectum:      Guaiac result positive.   Guaiac positive stool. : Acceptable.          ED Course   Procedures  Labs Reviewed   CBC W/ AUTO DIFFERENTIAL - Abnormal       Result Value    WBC 4.70      RBC 3.66 (*)     Hemoglobin 11.0 (*)     Hematocrit 33.4 (*)     MCV 91      MCH 30.1      MCHC 32.9      RDW 13.1      Platelets 217      MPV 9.8      Immature Granulocytes 0.6 (*)     Gran # (ANC) 3.5      Immature Grans (Abs) 0.03      Lymph # 0.4 (*)     Mono # 0.6      Eos # 0.1      Baso # 0.05      nRBC 0      Gran % 75.3 (*)     Lymph % 8.7 (*)     Mono % 11.7      Eosinophil % 2.6      Basophil % 1.1      Differential Method Automated     COMPREHENSIVE METABOLIC PANEL - Abnormal    Sodium 133 (*)     Potassium 3.8      Chloride 103      CO2 23      Glucose 111 (*)     BUN 16      Creatinine 0.7      Calcium 8.5 (*)     Total Protein 6.2      Albumin 3.7      Total  Bilirubin 0.4      Alkaline Phosphatase 60      AST 22      ALT 23      eGFR >60.0      Anion Gap 7 (*)    TYPE & SCREEN    Group & Rh O POS      Indirect Joleen NEG      Specimen Outdate 11/03/2024 23:59          ECG Results              EKG 12-lead (Final result)        Collection Time Result Time QRS Duration OHS QTC Calculation    10/31/24 08:52:12 10/31/24 09:37:48 84 424                     Final result by Interface, Lab In [facility] (10/31/24 09:37:57)                   Narrative:    Test Reason : K92.2,    Vent. Rate : 075 BPM     Atrial Rate : 075 BPM     P-R Int : 174 ms          QRS Dur : 084 ms      QT Int : 380 ms       P-R-T Axes : 024 -01 035 degrees     QTc Int : 424 ms    Normal sinus rhythm  Moderate voltage criteria for LVH, may be normal variant  Nonspecific T wave abnormality  Abnormal ECG  When compared with ECG of 21-JUN-2016 10:01,  No significant change was found    Confirmed by Ahsan CURRY MD ([id number]) on 10/31/2024 9:37:44 AM    Referred By: [email or web address]   SELF           Confirmed By:Ahsan CURRY MD                                  Imaging Results              CTA Acute GI [place], Abdomen and Pelvis (Final result)  Result time 10/31/24 11:36:28      Final result by Dann Thomas MD (10/31/24 11:36:28)                   Impression:      No evidence of active GI bleed.    Uncomplicated sigmoid diverticulitis.    Hepatic steatosis.    Minimally complex cysts in the liver and right kidney.    Other findings as described.      Electronically signed by: Dann Thomas  Date:    10/31/2024  Time:    11:36               Narrative:    EXAMINATION:  CTA ACUTE GI BLEED, ABDOMEN AND PELVIS    CLINICAL HISTORY:  Lower GI bleed;    TECHNIQUE:  CT angiogram of the abdomen and pelvis before and after intravenous administration of 75 mL Omnipaque 350. noncontrast, arterial, and portal venous phases were acquired.  No oral contrast.    COMPARISON:  CT urogram 08/10/2022    FINDINGS:  LUNG BASES: Unchanged pulmonary  nodules in the lung bases measuring up to 0.5 cm on the right.    HEPATOBILIARY: Diffuse hepatic steatosis.  Multiple hepatic cysts measuring up to 3.6 cm, some with thin septations.  No new liver lesion.  Normal gallbladder.  No bile duct dilatation.    SPLEEN: No splenomegaly.    PANCREAS: No focal masses or ductal dilatation.    ADRENALS: No adrenal nodules.    KIDNEYS/URETERS: No hydronephrosis or stones.  Unchanged 2.3 cm thinly septated cyst in the right midpole.  Subcentimeter hypodensity in the right lower pole, too small to characterize but unchanged as well.    BLADDER/PELVIC ORGANS: Bladder is unremarkable.  Mild prostatomegaly.    PERITONEUM / RETROPERITONEUM: No free air or fluid.    LYMPH NODES: No lymphadenopathy.    VESSELS: Portal veins are patent.  Mild atherosclerosis.  Celiac and mesenteric arteries are patent.    GI TRACT: Colonic diverticulosis.  Wall thickening and pericolonic fat stranding in the sigmoid colon.  No bowel obstruction.  Appendectomy.  No extravasation of contrast into the bowel lumen.    BONES AND SOFT TISSUES: Bilateral L5 pars defects, with grade 1 anterolisthesis at L5-S1.  No acute fracture.  No focal osseous lesion.                                       Medications   iohexoL (OMNIPAQUE 350) injection 75 mL (75 mLs Intravenous Given 10/31/24 1059)     Medical Decision Making  79-year-old male with past medical history of Crohn's (on mesalamine), pancreatic insufficiency (on Creon), hypertension, and hyperlipidemia who presents to the emergency department with complaints of rectal bleeding.  Differential diagnosis including but not limited to upper GI bleed, lower GI bleed, diverticulitis, and gastroenteritis.    Gastroenterologist consulted and recommended hospital medicine admission and H&H and observe for repeat episodes of hematochezia, to decide necessity of colonoscopy inpatient versus outpatient.  However, CTA abdomen and pelvis was only remarkable for diverticulitis  therefore patient elected to leave AMA with outpatient follow up scheduled by his gastroenterologist. Patient was informed of risks associated with leaving AMA including but not limited to worsening of symptoms, trauma, significant blood loss, and death but persistently elected to leave AMA.  Therefore who was discharged with return precautions and a prescription for Augmentin to treat his diverticulitis.    Amount and/or Complexity of Data Reviewed  Labs: ordered.  Radiology: ordered. Decision-making details documented in ED Course.  Discussion of management or test interpretation with external provider(s): Discussed case with gastroenterologist recommended admission to trend H&H and observe for repeat hematochezia.    Risk  Prescription drug management.               ED Course as of 10/31/24 1427   Thu Oct 31, 2024   1009 ATTENDING ATTESTATION:    This is a 79 y.o. male with a history of diverticulitis, currently being worked up for IBD who presents with bright red blood per rectum since last night.  He was had multiple episodes of blood in his stool since last evening, and wife notes about 4-5.  She took several pictures and showed me, which showed clear bright red blood in the toilet multiple times.  Patient states he otherwise feels well, denies any dizziness, lightheadedness, nausea, vomiting.  He has been having some abdominal pain/cramping work before he was had a bowel movement over the past day but states that he feels okay right now.  He was initial vital signs are stable, he was afebrile, and not tachycardic.  We will check basic labs, type and screen, and send for a CTA of the abdomen and pelvis to identify possible intervenable acute GI bleed.  Patient has been in close contact with his GI doctor who sent him in today.      Ronnie Rodriguez MD [MB]   1200 CTA Acute GI [place], Abdomen and Pelvis  Unremarkable for acute GI bleed process [AH]      ED Course User Index  [AH] Carol Yeung MD  [MB]  Ronnie Rodriguez MD                           Clinical Impression:  Final diagnoses:  [K92.2] Lower GI bleed  [K57.92] Diverticulitis (Primary)  [K92.1] Hematochezia          ED Disposition Condition    AMA Stable                Carol Yeung MD  Resident  10/31/24 2355

## 2024-10-31 NOTE — CONSULTS
[facility]  Gastroenterology  Consult Note    Patient Name: Alejandro Wayne  MRN: [id number]  Admission Date: 10/31/2024  Hospital Length of Stay: 0 days  Code Status: Prior   Attending Provider: Ronnie Rodriguez MD   Consulting Provider: Tremaine Graves MD  Primary Care Physician: Edward Sheppard MD  Principal Problem:<principal problem not specified>    Inpatient consult to Gastroenterology  Consult performed by: Tremaine Graves MD  Consult ordered by: Carol Yeung MD        Subjective:     HPI: Aleajndro Wayne is a 79 y.o. male with history of Crohn's disease of colon on mesalamine, pancreatic insufficiency on Creon, HTN, HLD who presents to the ER with hematochezia that started yesterday night. He had 2 episodes of hematochezia yesterday and 1 episode this morning but have been improving overall. Reports there is not as much blood output today compared to yesterday. Denies abdominal pain, change in bowel movements prior to the hematochezia, nausea, vomiting. Reports having diverticulitis in the past but no similar hematochezia episodes previously.      Review of Systems   Constitutional:  Negative for chills, fever and malaise/fatigue.   Respiratory:  Negative for shortness of breath.    Cardiovascular:  Negative for palpitations.   Gastrointestinal:  Positive for blood in stool. Negative for abdominal pain, constipation, diarrhea, melena, nausea and vomiting.   Neurological:  Negative for weakness.        Objective:     Vitals:    10/31/24 1015   BP: 119/68   Pulse: 67   Resp: 19   Temp:          Constitutional:  not in acute distress and well developed  HENT: Head: Normal, normocephalic, atraumatic.  Eyes: conjunctiva clear and sclera nonicteric  GI: soft, non-tender, without masses or organomegaly  Skin: normal color  Neurological: alert, oriented x3  Psychiatric: mood and affect are within normal limits, pt is a good historian; no memory problems were  noted    Significant Labs:  Reviewed pertinent laboratory tests    Significant Imaging:  Reviewed       Assessment/Plan:     Alejandro Wayne is a 79 y.o. male with history of Crohn's disease of colon on mesalamine, pancreatic insufficiency on Creon, HTN, HLD who presents to the ER with hematochezia x3 since yesterday but have been improving overall. Likely has diverticular bleed and is unrelated to his Crohn's disease given lack of worsening diarrhea/IBD symptoms and normal CRP. Given no further bloody bowel movements and stable Hgb, will hold off on colonoscopy and monitor H/H and stools.    Problem List:  Hematochezia  Crohn's colitis  Diverticulosis  Pancreatic insufficiency    Recommendations:  - Keep patient on clear liquid diet and trend H/H and monitor stools. If he continues to have hematochezia and/or drop in H/H, please contact GI fellow ASAP and will plan for inpatient colonoscopy on 11/1. If H/H stable and no further hematochezia seen, can arrange for urgent colonoscopy outpatient.  - Clear liquid diet  - Trend H/H q6-8 hours  - Transfuse to keep Hgb >7, plts >50  - Hold anticoagulants if safe to do so per primary team  - If becomes hemodynamically unstable with associated large volume hematochezia, recommend STAT CTA and IR embolization if positive    Thank you for involving us in the care of Alejandro Wayne. Please call with any additional questions, concerns or changes in the patient's clinical status. We will continue to follow.    Discussed and seen with Dr. Waller.    Tremaine Graves MD  Gastroenterology Fellow PGY-IV  [facility]

## 2024-11-12 ENCOUNTER — PROCEDURE VISIT (OUTPATIENT)
Dept: DERMATOLOGY | Facility: CLINIC | Age: 79
End: 2024-11-12
Payer: COMMERCIAL

## 2024-11-12 VITALS — DIASTOLIC BLOOD PRESSURE: 84 MMHG | SYSTOLIC BLOOD PRESSURE: 138 MMHG | HEART RATE: 70 BPM

## 2024-11-12 DIAGNOSIS — C44.329 SQUAMOUS CELL CARCINOMA OF FOREHEAD: ICD-10-CM

## 2024-11-12 PROCEDURE — 17312 MOHS ADDL STAGE: CPT | Mod: S$GLB,,, | Performed by: DERMATOLOGY

## 2024-11-12 PROCEDURE — 13132 CMPLX RPR F/C/C/M/N/AX/G/H/F: CPT | Mod: 51,S$GLB,, | Performed by: DERMATOLOGY

## 2024-11-12 PROCEDURE — 17311 MOHS 1 STAGE H/N/HF/G: CPT | Mod: S$GLB,,, | Performed by: DERMATOLOGY

## 2024-11-12 PROCEDURE — 99499 UNLISTED E&M SERVICE: CPT | Mod: S$GLB,,, | Performed by: DERMATOLOGY

## 2024-11-12 RX ORDER — HYDROCODONE BITARTRATE AND ACETAMINOPHEN 5; 325 MG/1; MG/1
1 TABLET ORAL EVERY 6 HOURS PRN
Qty: 10 TABLET | Refills: 0 | Status: SHIPPED | OUTPATIENT
Start: 2024-11-12

## 2024-11-12 RX ORDER — CEPHALEXIN 500 MG/1
500 CAPSULE ORAL 3 TIMES DAILY
Qty: 21 CAPSULE | Refills: 0 | Status: SHIPPED | OUTPATIENT
Start: 2024-11-12 | End: 2024-11-19

## 2024-11-12 NOTE — PROGRESS NOTES
PROCEDURE: Mohs' Micrographic Surgery    INDICATION: Location in non-mask areas of face where maximum conservation of tumor-free tissue is needed. Biopsy-proven skin cancer of cosmetically and functionally important areas, including head, neck, genital, hand, foot, or areas known for having difficulty in healing, such as the lower anterior legs. Tumor with ill-defined borders.    REFERRING PROVIDER: Esau Chi M.D.    CASE NUMBER:     ANESTHETIC: 1.5 cc 0.5% Lidocaine with Epi 1:200,000 mixed 1:1 with 0.5% Bupivacaine    SURGICAL PREP: Hibiclens    SURGEON: Quentin Walker MD    ASSISTANTS: Terri Tamez PA-C and Peg Arcos MA    PREOPERATIVE DIAGNOSIS: squamous cell carcinoma    POSTOPERATIVE DIAGNOSIS: squamous cell carcinoma    PATHOLOGIC DIAGNOSIS: squamous cell carcinoma    HISTOLOGY OF SPECIMENS IN FIRST STAGE:   Tumor Type:  No tumor seen.    STAGES OF MOHS' SURGERY PERFORMED: 1    TUMOR-FREE PLANE ACHIEVED: Yes    HEMOSTASIS: electrocoagulation     SPECIMENS: 2     LOCATION: mid superior forehead. Location verified with Dr. Chi's clinical photograph. Patient also verified location with hand held mirror.    INITIAL LESION SIZE: 0.4 x 0.5 cm    FINAL DEFECT SIZE: 0.6 x 0.8 cm    WOUND REPAIR/DISPOSITION: The patient tolerated Mohs' Micrographic Surgery for a squamous cell carcinoma very well. When the tumor was completely removed, a repair of the surgical defect was undertaken.        PROCEDURE: Mohs' Micrographic Surgery    INDICATION: Location in non-mask areas of face where maximum conservation of tumor-free tissue is needed. Biopsy-proven skin cancer of cosmetically and functionally important areas, including head, neck, genital, hand, foot, or areas known for having difficulty in healing, such as the lower anterior legs. Tumor with ill-defined borders.    REFERRING PROVIDER: Esau Chi M.D.    CASE NUMBER:     ANESTHETIC: 4.5 cc 0.5% Lidocaine with Epi 1:200,000 mixed 1:1  with 0.5% Bupivacaine and 0.5 cc 1% Lidocaine with Epinephrine 1:100,000    SURGICAL PREP: Hibiclens    SURGEON: Quentin Walker MD    ASSISTANTS: Terri Tamez PA-C and Peg Arcos MA    PREOPERATIVE DIAGNOSIS: squamous cell carcinoma in situ    POSTOPERATIVE DIAGNOSIS: squamous cell carcinoma in situ    PATHOLOGIC DIAGNOSIS: squamous cell carcinoma in situ    HISTOLOGY OF SPECIMENS IN FIRST STAGE:   Tumor Type: Tumor seen. Squamous cell carcinoma in situ: Epidermis with full-thickness atypia and variable epidermal maturation.   Depth of Invasion: epidermis  Perineural Invasion: No    HISTOLOGY OF SPECIMENS IN SUBSEQUENT STAGES:  Tumor Type: Tumor seen. Same as in first stage.   Depth of Invasion: epidermis  Perineural Invasion: No    STAGES OF MOHS' SURGERY PERFORMED: 3    TUMOR-FREE PLANE ACHIEVED: Yes    HEMOSTASIS: electrocoagulation     SPECIMENS: 5 (2 in stage A, 2 in stage B, and 1 in stage C)    LOCATION: left medial superior forehead at hairline. Location verified with Dr. Chi's clinical photograph. Patient also verified location with hand held mirror.    INITIAL LESION SIZE: 0.6 x 0.7 cm    FINAL DEFECT SIZE: 1.4 x 1.5 cm    WOUND REPAIR/DISPOSITION: The patient tolerated Mohs' Micrographic Surgery for a squamous cell carcinoma in situ very well. When the tumor was completely removed, a repair of the surgical defect was undertaken.        PROCEDURE: Complex Linear Repair    INDICATION: Status post Mohs' Micrographic Surgery for squamous cell carcinoma in situ.    CASE NUMBER:  and     SURGEON: Quentin Walker MD    ASSISTANTS: Terri Tamez PA-C and Jihan Montoya Surg Tech    ANESTHETIC: 3 cc 1% Lidocaine with Epinephrine 1:100,000    SURGICAL PREP: Hibiclens, prepped by Jihan Montoya, Surg Tech    LOCATION: mid superior forehead and left medial superior forehead at hairline (connects with one repair)    DEFECT SIZE: 0.6 x 0.8 cm for the mid superior forehead and 1.4 x 1.5 cm for the left  medial superior forehead at hairline    WOUND REPAIR/DISPOSITION:  After the patient's carcinomas had been completely removed with Mohs' Micrographic Surgery, a repair of the surgical defects were undertaken. The patient was returned to the operating suite where the areas of mid superior forehead and left medial superior forehead at hairline were prepped, draped, and anesthetized in the usual sterile fashion. The intervening normal tissue between the two defects was then removed with a #15 blade. The wound was widely undermined in all directions. The wound was undermined to a distance at least the maximum width of the defect as measured perpendicular to the closure line along at least one entire edge of the defect, in this case 2 cm. Then, electrocoagulation was used to obtain meticulous hemostasis. 3-0 Vicryl and 4-0 Vicryl buried vertical mattress sutures were placed into the subcutaneous and dermal plane to close the wound and samreen the cutaneous wound edge. Bilateral dog ears were identified and were removed by a standard Burow's triangle technique. The cutaneous wound edges were closed using interrupted 4-0 Prolene suture.    The patient tolerated the procedure well.    The area was cleaned and dressed appropriately and the patient was given wound care instructions, as well as appointment for follow-up evaluation and suture removal in 14 days. Patient was placed on Norco 5-325 mg prn postop pain and Keflex 500 mg TID x 7 days.    LENGTH OF REPAIR: 5.6 cm    Vitals:    11/12/24 1004 11/12/24 1320   BP: (!) 147/82 138/84   BP Location: Left arm    Patient Position: Sitting    Pulse: 84 70

## 2024-11-15 RX ORDER — PANTOPRAZOLE SODIUM 20 MG/1
TABLET, DELAYED RELEASE ORAL
Qty: 90 TABLET | Refills: 3 | Status: SHIPPED | OUTPATIENT
Start: 2024-11-15

## 2024-11-25 ENCOUNTER — OFFICE VISIT (OUTPATIENT)
Dept: DERMATOLOGY | Facility: CLINIC | Age: 79
End: 2024-11-25
Payer: COMMERCIAL

## 2024-11-25 DIAGNOSIS — Z09 POSTOP CHECK: Primary | ICD-10-CM

## 2024-11-25 PROCEDURE — 1126F AMNT PAIN NOTED NONE PRSNT: CPT | Mod: CPTII,S$GLB,, | Performed by: DERMATOLOGY

## 2024-11-25 PROCEDURE — 99999 PR PBB SHADOW E&M-EST. PATIENT-LVL III: CPT | Mod: PBBFAC,,, | Performed by: DERMATOLOGY

## 2024-11-25 PROCEDURE — 1159F MED LIST DOCD IN RCRD: CPT | Mod: CPTII,S$GLB,, | Performed by: DERMATOLOGY

## 2024-11-25 PROCEDURE — 99024 POSTOP FOLLOW-UP VISIT: CPT | Mod: S$GLB,,, | Performed by: DERMATOLOGY

## 2024-11-25 PROCEDURE — 3288F FALL RISK ASSESSMENT DOCD: CPT | Mod: CPTII,S$GLB,, | Performed by: DERMATOLOGY

## 2024-11-25 PROCEDURE — 1101F PT FALLS ASSESS-DOCD LE1/YR: CPT | Mod: CPTII,S$GLB,, | Performed by: DERMATOLOGY

## 2024-11-25 NOTE — PROGRESS NOTES
79 y.o. male patient is here for suture removal following Mohs' surgery.    Patient reports no problems.    WOUND PE:  The mid superior forehead sutures intact. Wound healing well. Good skin edges. No signs or symptoms of infection.    IMPRESSION:  Healing operative site from Mohs' surgery SCC mid superior forehead s/p Mohs with CLC, postop day #13.    PLAN:  Sutures removed today by  Kinga Gutierrez MA . Steri-strips applied.  Continue wound care.  Keep moist with Aquaphor.  Call if any concerns arise    RTC:  In 3-6 months with Esau Chi M.D. for skin check or sooner if new concern arises.

## 2024-11-26 ENCOUNTER — TELEPHONE (OUTPATIENT)
Dept: ENDOSCOPY | Facility: HOSPITAL | Age: 79
End: 2024-11-26
Payer: COMMERCIAL

## 2024-11-26 NOTE — TELEPHONE ENCOUNTER
----- Message from Millie sent at 10/31/2024  3:36 PM CDT -----  Regarding: FW: Endo case request    ----- Message -----  From: Tremaine Graves MD  Sent: 10/31/2024   3:05 PM CDT  To: [facility]  Subject: Endo case request                                Procedure: Colonoscopy    Diagnosis: Rectal bleeding    Procedure Timing: Within 4 weeks (Urgent)    Provider: Any GI provider    Location: Any Site    Additional Scheduling Information: No scheduling concerns    Prep Specifications:Standard prep    Is the patient taking a GLP-1 Agonist:no    Have you attached a patient to this message: yes

## 2024-11-26 NOTE — TELEPHONE ENCOUNTER
Ma spoke with pt , pt states he wants to wait until he gets back in town , pt states he will call to schedule

## 2025-01-06 ENCOUNTER — PATIENT MESSAGE (OUTPATIENT)
Dept: GASTROENTEROLOGY | Facility: CLINIC | Age: 80
End: 2025-01-06
Payer: COMMERCIAL

## 2025-01-06 DIAGNOSIS — K50.10 CROHN'S DISEASE OF COLON WITHOUT COMPLICATION: Primary | ICD-10-CM

## 2025-01-09 ENCOUNTER — TELEPHONE (OUTPATIENT)
Dept: ENDOSCOPY | Facility: HOSPITAL | Age: 80
End: 2025-01-09
Payer: COMMERCIAL

## 2025-01-09 DIAGNOSIS — K62.5 RECTAL BLEED: Primary | ICD-10-CM

## 2025-01-09 RX ORDER — SODIUM, POTASSIUM,MAG SULFATES 17.5-3.13G
1 SOLUTION, RECONSTITUTED, ORAL ORAL DAILY
Qty: 1 KIT | Refills: 0 | Status: SHIPPED | OUTPATIENT
Start: 2025-01-09 | End: 2025-01-11

## 2025-01-09 NOTE — TELEPHONE ENCOUNTER
"    Message  Received: Today  Анна Ortiz Eileen F., RN  Caller: Unspecified (3 days ago,  1:33 PM)         Previous Messages       ----- Message -----  From: Millie Lewis  Sent: 1/7/2025   2:04 PM CST  To: Анна Ortiz      ----- Message -----  From: Wendy Alex RN  Sent: 1/6/2025   1:35 PM CST  To: [facility] Patients; *    Procedure: Colonoscopy    Diagnosis: Colitis, lower GI bleeding    Procedure Timing: Within 4 weeks (Urgent)    *If within 4 weeks selected, please van as high priority*    *If greater than 12 weeks, please select "5-12 weeks" and delay sending until 2 months prior to requested date*    Provider: Matthew    Location: Any Site    Additional Scheduling Information: No scheduling concerns    Prep Specifications:Standard prep; Suprep or Golytely    Is the patient taking a GLP-1 Agonist:no    Have you attached a patient to this message: yes  "

## 2025-01-09 NOTE — TELEPHONE ENCOUNTER
Referral for procedure from [facility]      Spoke to pt to schedule procedure(s) Colonoscopy       Physician to perform procedure(s) Dr. VICENTA Castro  Date of Procedure (s) 1/23/25  Arrival Time 7:00 AM  Time of Procedure(s) 8:00 AM   Location of Procedure(s) [place] 4th Floor  Type of Rx Prep sent to patient: Suprep  Instructions provided to patient via [facility]    Patient was informed on the following information and verbalized understanding. Screening questionnaire reviewed with patient and complete. If procedure requires anesthesia, a responsible adult needs to be present to accompany the patient home, patient cannot drive after receiving anesthesia. Appointment details are tentative, especially check-in time. Patient will receive a prep-op call 7 days prior to confirm check-in time for procedure. If applicable the patient should contact their pharmacy to verify Rx for procedure prep is ready for pick-up. Patient was advised to call the scheduling department at [phone] if pharmacy states no Rx is available. Patient was advised to call the endoscopy scheduling department if any questions or concerns arise.      [facility] Scheduling Department

## 2025-01-10 ENCOUNTER — LAB VISIT (OUTPATIENT)
Dept: LAB | Facility: OTHER | Age: 80
End: 2025-01-10
Attending: INTERNAL MEDICINE
Payer: COMMERCIAL

## 2025-01-10 ENCOUNTER — PATIENT MESSAGE (OUTPATIENT)
Dept: GASTROENTEROLOGY | Facility: CLINIC | Age: 80
End: 2025-01-10
Payer: COMMERCIAL

## 2025-01-10 DIAGNOSIS — K50.10 CROHN'S DISEASE OF COLON WITHOUT COMPLICATION: ICD-10-CM

## 2025-01-10 LAB
BASOPHILS # BLD AUTO: 0.08 K/UL (ref 0–0.2)
BASOPHILS NFR BLD: 1.8 % (ref 0–1.9)
DIFFERENTIAL METHOD BLD: ABNORMAL
EOSINOPHIL # BLD AUTO: 0.1 K/UL (ref 0–0.5)
EOSINOPHIL NFR BLD: 2.5 % (ref 0–8)
ERYTHROCYTE [DISTWIDTH] IN BLOOD BY AUTOMATED COUNT: 13.4 % (ref 11.5–14.5)
HCT VFR BLD AUTO: 35.8 % (ref 40–54)
HGB BLD-MCNC: 12.1 G/DL (ref 14–18)
IMM GRANULOCYTES # BLD AUTO: 0.01 K/UL (ref 0–0.04)
IMM GRANULOCYTES NFR BLD AUTO: 0.2 % (ref 0–0.5)
LYMPHOCYTES # BLD AUTO: 0.6 K/UL (ref 1–4.8)
LYMPHOCYTES NFR BLD: 14.4 % (ref 18–48)
MCH RBC QN AUTO: 29.5 PG (ref 27–31)
MCHC RBC AUTO-ENTMCNC: 33.8 G/DL (ref 32–36)
MCV RBC AUTO: 87 FL (ref 82–98)
MONOCYTES # BLD AUTO: 0.8 K/UL (ref 0.3–1)
MONOCYTES NFR BLD: 17.7 % (ref 4–15)
NEUTROPHILS # BLD AUTO: 2.8 K/UL (ref 1.8–7.7)
NEUTROPHILS NFR BLD: 63.4 % (ref 38–73)
NRBC BLD-RTO: 0 /100 WBC
PLATELET # BLD AUTO: 232 K/UL (ref 150–450)
PMV BLD AUTO: 9.9 FL (ref 9.2–12.9)
RBC # BLD AUTO: 4.1 M/UL (ref 4.6–6.2)
WBC # BLD AUTO: 4.36 K/UL (ref 3.9–12.7)

## 2025-01-10 PROCEDURE — 36415 COLL VENOUS BLD VENIPUNCTURE: CPT | Performed by: INTERNAL MEDICINE

## 2025-01-10 PROCEDURE — 85025 COMPLETE CBC W/AUTO DIFF WBC: CPT | Performed by: INTERNAL MEDICINE

## 2025-01-22 ENCOUNTER — TELEPHONE (OUTPATIENT)
Dept: ENDOSCOPY | Facility: HOSPITAL | Age: 80
End: 2025-01-22
Payer: COMMERCIAL

## 2025-01-22 NOTE — TELEPHONE ENCOUNTER
Endoscopy unit  will be closed on 1/23/25 due to weather circumstances. Pt notified and verbalized understanding. Endoscopy scheduling team to contact Pt to reschedule endoscopy procedure.

## 2025-01-23 ENCOUNTER — TELEPHONE (OUTPATIENT)
Dept: ENDOSCOPY | Facility: HOSPITAL | Age: 80
End: 2025-01-23
Payer: COMMERCIAL

## 2025-01-23 NOTE — TELEPHONE ENCOUNTER
Referral for procedure from [facility]      Spoke to pt to reschedule procedure(s) Colonoscopy       Physician to perform procedure(s) Dr. VICENTA Castro  Date of Procedure (s) 2/20/25  Arrival Time 7:30 AM  Time of Procedure(s) 8:30 AM   Location of Procedure(s) [place] 4th Floor  Type of Rx Prep sent to patient: Suprep  Instructions provided to patient via [facility]    Patient was informed on the following information and verbalized understanding. Screening questionnaire reviewed with patient and complete. If procedure requires anesthesia, a responsible adult needs to be present to accompany the patient home, patient cannot drive after receiving anesthesia. Appointment details are tentative, especially check-in time. Patient will receive a prep-op call 7 days prior to confirm check-in time for procedure. If applicable the patient should contact their pharmacy to verify Rx for procedure prep is ready for pick-up. Patient was advised to call the scheduling department at [phone] if pharmacy states no Rx is available. Patient was advised to call the endoscopy scheduling department if any questions or concerns arise.      [facility]oscopy Scheduling Department

## 2025-02-04 ENCOUNTER — TELEPHONE (OUTPATIENT)
Dept: ENDOSCOPY | Facility: HOSPITAL | Age: 80
End: 2025-02-04
Payer: COMMERCIAL

## 2025-02-04 NOTE — TELEPHONE ENCOUNTER
Called pt. To reschedule  Colonoscopy. Pt. States he unable to commit to date at this time as he is traveling for next few months. He requested Scheduling phone number so he can call back in March. Provided to pt.

## 2025-02-24 DIAGNOSIS — I10 ESSENTIAL HYPERTENSION: ICD-10-CM

## 2025-02-24 RX ORDER — AMLODIPINE BESYLATE 5 MG/1
5 TABLET ORAL
Qty: 90 TABLET | Refills: 4 | Status: SHIPPED | OUTPATIENT
Start: 2025-02-24

## 2025-04-04 DIAGNOSIS — N32.81 OVERACTIVE BLADDER: ICD-10-CM

## 2025-04-04 RX ORDER — MIRABEGRON 50 MG/1
TABLET, FILM COATED, EXTENDED RELEASE ORAL
Qty: 90 TABLET | Refills: 3 | Status: SHIPPED | OUTPATIENT
Start: 2025-04-04

## 2025-05-20 ENCOUNTER — TELEPHONE (OUTPATIENT)
Dept: GASTROENTEROLOGY | Facility: CLINIC | Age: 80
End: 2025-05-20
Payer: COMMERCIAL

## 2025-05-20 NOTE — TELEPHONE ENCOUNTER
----- Message from Mookie sent at 5/19/2025  1:50 PM CDT -----  Regarding: Rescheduling Request  Contact: [phone]  Rescheduling Request   Appt Type:  ep  Date/Time Preference late June  Caller Name: pt  Contact Preference:  [phone]: Will be out of town, nothing available in epic please call to advise thank you

## 2025-06-24 ENCOUNTER — PATIENT MESSAGE (OUTPATIENT)
Dept: UROLOGY | Facility: CLINIC | Age: 80
End: 2025-06-24
Payer: COMMERCIAL

## 2025-06-24 ENCOUNTER — TELEPHONE (OUTPATIENT)
Dept: UROLOGY | Facility: CLINIC | Age: 80
End: 2025-06-24
Payer: COMMERCIAL

## 2025-06-24 NOTE — TELEPHONE ENCOUNTER
Called pt and confirmed appt date and time    Copied from CRM #[id number]. Topic: Appointments - Appointment Confirmation  >> Jun 24, 2025  1:18 PM Mary Anne wrote:   Type:  Needs Medical Advice    Who Called: Gordon called in regards to confirming his appt with Dr Lopez

## 2025-06-26 ENCOUNTER — TELEPHONE (OUTPATIENT)
Dept: GASTROENTEROLOGY | Facility: CLINIC | Age: 80
End: 2025-06-26
Payer: COMMERCIAL

## 2025-06-26 NOTE — TELEPHONE ENCOUNTER
Copied from CRM #[id number]. Topic: Appointments - Appointment Access  >> Jun 26, 2025 12:33 PM Marylin wrote:  Pt is calling to reschedule appt 6/26 due to meeting, asking for call back

## 2025-07-10 ENCOUNTER — TELEPHONE (OUTPATIENT)
Dept: UROLOGY | Facility: CLINIC | Age: 80
End: 2025-07-10
Payer: COMMERCIAL

## 2025-07-10 NOTE — TELEPHONE ENCOUNTER
Moved appt to Wednesday at 9    Copied from CRM #[id number]. Topic: Appointments - Appointment Access  >> Jul 10, 2025 10:04 AM Megan wrote:  Patient calling to reschedule appt. Pls call

## 2025-07-15 ENCOUNTER — OFFICE VISIT (OUTPATIENT)
Dept: UROLOGY | Facility: CLINIC | Age: 80
End: 2025-07-15
Payer: COMMERCIAL

## 2025-07-15 VITALS
DIASTOLIC BLOOD PRESSURE: 80 MMHG | HEART RATE: 76 BPM | BODY MASS INDEX: 26.45 KG/M2 | SYSTOLIC BLOOD PRESSURE: 136 MMHG | WEIGHT: 168.88 LBS

## 2025-07-15 DIAGNOSIS — N32.81 OVERACTIVE BLADDER: ICD-10-CM

## 2025-07-15 PROCEDURE — 3079F DIAST BP 80-89 MM HG: CPT | Mod: CPTII,S$GLB,, | Performed by: UROLOGY

## 2025-07-15 PROCEDURE — 3075F SYST BP GE 130 - 139MM HG: CPT | Mod: CPTII,S$GLB,, | Performed by: UROLOGY

## 2025-07-15 PROCEDURE — 1126F AMNT PAIN NOTED NONE PRSNT: CPT | Mod: CPTII,S$GLB,, | Performed by: UROLOGY

## 2025-07-15 PROCEDURE — G2211 COMPLEX E/M VISIT ADD ON: HCPCS | Mod: S$GLB,,, | Performed by: UROLOGY

## 2025-07-15 PROCEDURE — 1101F PT FALLS ASSESS-DOCD LE1/YR: CPT | Mod: CPTII,S$GLB,, | Performed by: UROLOGY

## 2025-07-15 PROCEDURE — 99999 PR PBB SHADOW E&M-EST. PATIENT-LVL III: CPT | Mod: PBBFAC,,, | Performed by: UROLOGY

## 2025-07-15 PROCEDURE — 1159F MED LIST DOCD IN RCRD: CPT | Mod: CPTII,S$GLB,, | Performed by: UROLOGY

## 2025-07-15 PROCEDURE — 3288F FALL RISK ASSESSMENT DOCD: CPT | Mod: CPTII,S$GLB,, | Performed by: UROLOGY

## 2025-07-15 PROCEDURE — 99213 OFFICE O/P EST LOW 20 MIN: CPT | Mod: S$GLB,,, | Performed by: UROLOGY

## 2025-07-15 RX ORDER — MIRABEGRON 50 MG/1
50 TABLET, FILM COATED, EXTENDED RELEASE ORAL DAILY
Qty: 90 TABLET | Refills: 3 | Status: SHIPPED | OUTPATIENT
Start: 2025-07-15 | End: 2026-07-15

## 2025-07-15 NOTE — PROGRESS NOTES
[facility]      Return Overactive Bladder (OAB) Note    7/15/2025    Referred by:  No ref. provider found    History of Present Illness    CHIEF COMPLAINT:  Patient presents for follow-up of overactive bladder and reports new onset of urinary incontinence.    HISTORY OF PRESENT ILLNESS:  Patient is an 80-year-old gentleman, last seen approximately 1 year and 3 months ago. He has a history of overactive bladder, which had significantly improved with Mirabegron 25 mg initiated in 2021. At that time, he had no voiding difficulty and had discontinued Tamsulosin. His Mirabegron dose was subsequently increased to 50 mg.    Currently, he reports new onset of urinary incontinence, describing frequent intermittent leakage. He occasionally uses pads to manage this issue. He drinks approximately 6 8-oz glasses of water daily. He finds the incontinence embarrassing, particularly noticeable when wearing light-colored pants.    He can feel the urge to urinate 98% of the time and is usually close to a bathroom. He has adopted strategies such as preemptively using the restroom when out with his wife. Even with these efforts, he still has occasional incontinence episodes.    He reports no awareness of any other health issues. He maintains an active lifestyle, mentioning involvement with grandchildren and travel to [place]. He emphasizes the importance of keeping both mind and body active in residential.    He denies any additional episodes of gross hematuria since a workup in 2021 and any current voiding difficulties.    MEDICATIONS:  Patient is on Mirabegron (Myrbetriq) 50 mg, taking one pill every morning for overactive bladder. He is also on Naproxen. His Mirabegron dosage was increased from 25 mg to 50 mg in the past. Tamsulosin has been discontinued.    MEDICAL HISTORY:  Patient has a history of overactive bladder, first diagnosed in 2021. He also experienced gross hematuria in 2021.    TEST  RESULTS:  Patient's PSA level was 2.3 nine months ago, showing no indication of prostate cancer. Patient underwent a protective evaluation in 2021.    SOCIAL HISTORY:  Occupation: Retired, former owner of a small company    Marital status:  for 49 years          A review of 10+ systems was conducted with pertinent positive and negative findings documented in HPI with all other systems reviewed and negative.    Past medical, family, surgical and social history reviewed as documented in chart with pertinent positive medical, family, surgical and social history detailed in HPI.    Previous incontinence therapies:  Myrbetriq 50 mg 3 years - mostly resolved with some residual urgency incontinence      Exam Findings:    Physical Exam    General: No acute distress. Nontoxic appearing.  HENT: Normocephalic. Atraumatic.  Respiratory: Normal respiratory effort. No conversational dyspnea. No audible wheezing.  Abdomen: No obvious distension.  Skin: No visible abnormalities.  Extremities: No edema upper extremities. No edema lower extremities.  Neurological: Alert and oriented x3. Normal speech.  Psychiatric: Normal mood. Normal affect. No evidence of SI.          Assessment/Plan:    Assessment & Plan    Overactive bladder symptoms previously well-controlled with Mirabegron 50 mg, now experiencing increased incontinence.  Incontinence likely exacerbated by high fluid intake and busy lifestyle.  Current incontinence management with pads suboptimal; timed voiding suggested as behavioral intervention.    PLAN SUMMARY:  - Implement timed voiding technique with gradual interval increase  - Continue Myrbetriq 50 mg daily  - Maintain preventive bathroom visits in public  - Follow-up in 6 months    OVERACTIVE BLADDER:  - Explained timed voiding technique and implementation strategy: start with urinating every 2 hours regardless of urge, then gradually increase interval (e.g., 2 hours and 15 minutes, then 2.5 hours) until  incontinence occurs, then slightly reduce to find optimal timing.  - Patient to continue preventive bathroom visits when out in public.  - Continued Myrbetriq 50 mg daily.  - Follow up in 6 months.              Visit complexity today is associated with medical care services that are part of the ongoing care related to the single serious and/or complex condition of Overactive bladder (OAB). A longitudinal relationship exists or is being developed between the patient and this practitioner for the care of this condition.

## 2025-07-16 ENCOUNTER — TELEPHONE (OUTPATIENT)
Dept: GASTROENTEROLOGY | Facility: CLINIC | Age: 80
End: 2025-07-16
Payer: COMMERCIAL

## 2025-07-16 NOTE — TELEPHONE ENCOUNTER
Copied from CRM #[id number]. Topic: Appointments - Appointment Rescheduling  >> Jul 16, 2025  8:18 AM Ladonna wrote:  RESCHEDULE/APPTS     Current Appt Date:0626-2025     Type of Appt: f/u     Physician:Matthew     Reason for Scheduling:Pt would like to rescheduled canceled appt. No appts available  please call      Caller:Alejandro Wayne         Contact Preference:[phone] (mobile)

## 2025-07-21 ENCOUNTER — TELEPHONE (OUTPATIENT)
Dept: ENDOSCOPY | Facility: HOSPITAL | Age: 80
End: 2025-07-21
Payer: COMMERCIAL

## 2025-07-21 ENCOUNTER — OFFICE VISIT (OUTPATIENT)
Dept: GASTROENTEROLOGY | Facility: CLINIC | Age: 80
End: 2025-07-21
Payer: COMMERCIAL

## 2025-07-21 VITALS
BODY MASS INDEX: 27 KG/M2 | HEIGHT: 66 IN | HEART RATE: 58 BPM | SYSTOLIC BLOOD PRESSURE: 130 MMHG | WEIGHT: 168 LBS | TEMPERATURE: 98 F | DIASTOLIC BLOOD PRESSURE: 78 MMHG | OXYGEN SATURATION: 94 %

## 2025-07-21 DIAGNOSIS — K86.1 CHRONIC PANCREATITIS, UNSPECIFIED PANCREATITIS TYPE: Primary | ICD-10-CM

## 2025-07-21 DIAGNOSIS — K57.30 SEGMENTAL COLITIS ASSOCIATED WITH DIVERTICULOSIS: ICD-10-CM

## 2025-07-21 DIAGNOSIS — K50.10 CROHN'S DISEASE OF COLON WITHOUT COMPLICATION: ICD-10-CM

## 2025-07-21 DIAGNOSIS — K50.10 SEGMENTAL COLITIS ASSOCIATED WITH DIVERTICULOSIS: ICD-10-CM

## 2025-07-21 DIAGNOSIS — K86.89 PANCREATIC INSUFFICIENCY: ICD-10-CM

## 2025-07-21 PROCEDURE — 3078F DIAST BP <80 MM HG: CPT | Mod: CPTII,S$GLB,, | Performed by: INTERNAL MEDICINE

## 2025-07-21 PROCEDURE — 1159F MED LIST DOCD IN RCRD: CPT | Mod: CPTII,S$GLB,, | Performed by: INTERNAL MEDICINE

## 2025-07-21 PROCEDURE — 3288F FALL RISK ASSESSMENT DOCD: CPT | Mod: CPTII,S$GLB,, | Performed by: INTERNAL MEDICINE

## 2025-07-21 PROCEDURE — 1126F AMNT PAIN NOTED NONE PRSNT: CPT | Mod: CPTII,S$GLB,, | Performed by: INTERNAL MEDICINE

## 2025-07-21 PROCEDURE — 1160F RVW MEDS BY RX/DR IN RCRD: CPT | Mod: CPTII,S$GLB,, | Performed by: INTERNAL MEDICINE

## 2025-07-21 PROCEDURE — G2211 COMPLEX E/M VISIT ADD ON: HCPCS | Mod: S$GLB,,, | Performed by: INTERNAL MEDICINE

## 2025-07-21 PROCEDURE — 99999 PR PBB SHADOW E&M-EST. PATIENT-LVL IV: CPT | Mod: PBBFAC,,, | Performed by: INTERNAL MEDICINE

## 2025-07-21 PROCEDURE — 99214 OFFICE O/P EST MOD 30 MIN: CPT | Mod: S$GLB,,, | Performed by: INTERNAL MEDICINE

## 2025-07-21 PROCEDURE — 3075F SYST BP GE 130 - 139MM HG: CPT | Mod: CPTII,S$GLB,, | Performed by: INTERNAL MEDICINE

## 2025-07-21 PROCEDURE — 1101F PT FALLS ASSESS-DOCD LE1/YR: CPT | Mod: CPTII,S$GLB,, | Performed by: INTERNAL MEDICINE

## 2025-07-21 RX ORDER — PANCRELIPASE 36000; 180000; 114000 [USP'U]/1; [USP'U]/1; [USP'U]/1
3 CAPSULE, DELAYED RELEASE PELLETS ORAL
Qty: 270 CAPSULE | Refills: 11 | Status: SHIPPED | OUTPATIENT
Start: 2025-07-21 | End: 2025-07-21 | Stop reason: SDUPTHER

## 2025-07-21 RX ORDER — PANCRELIPASE 36000; 180000; 114000 [USP'U]/1; [USP'U]/1; [USP'U]/1
3 CAPSULE, DELAYED RELEASE PELLETS ORAL
Qty: 810 CAPSULE | Refills: 3 | Status: SHIPPED | OUTPATIENT
Start: 2025-07-21

## 2025-07-21 NOTE — PROGRESS NOTES
[facility]          Inflammatory Bowel Disease Follow Up Note         TODAY'S VISIT DATE:  7/21/2025    Reason for Consult:    Chief Complaint   Patient presents with    Ulcerative Colitis       PCP: Edward Sheppard      Referring MD:   No ref. provider found    History of Present Illness:  Alejandro Wayne who is a 80 y.o. male is being seen today at the [facility] on 07/21/2025 for inflammatory bowel disease- Crohn's disease.  Overall he is doing pretty well.  He had an episode of diverticulitis in October that responded to antibiotics.  He has not required any antibiotics since that time.  He reports that he is having 3-4 formed bowel movements daily with no blood in his stools and no abdominal pain.  Overall he is feeling quite well.  He continues to take Creon 58459 units 3 tablets with each meal.  He is also taking mesalamine 4.8 g daily.  He denies any side effects with the there these medications.    IBD History:  He has had several years of intermittent gastrointestinal problems.  He was initially seen by Dr. Mathews in November of 2015 with complaints of chronic diarrhea.  At that time he was found to have a low fecal elastase level consistent with pancreatic insufficiency.  Endoscopic ultrasound was performed and found changes consistent with mild chronic pancreatitis.  He has a history of chronic alcohol use.  A CT scan was also performed around that time which showed changes consistent with diverticulitis.  In June of 2016 he underwent a screening colonoscopy which revealed a normal terminal ileum, multiple diverticula in the sigmoid, descending, transverse, and ascending colon.  There was also some moderately erythematous mucosa in the mid sigmoid and distal sigmoid colon and biopsies were taken which did not show any significant features.  In October 2018 he had a repeat CT scan done because of abdominal pain and he was again found  to have multiple diverticula as well as some luminal narrowing and wall thickening and changes consistent with sigmoid diverticulitis.  In October 2019 he underwent an upper endoscopy and colonoscopy because of iron deficiency anemia and diarrhea.  The upper endoscopy revealed a small hiatal hernia but was otherwise normal.  Colonoscopy revealed a normal terminal ileum, multiple diverticula, moderately erythematous mucosa in the sigmoid colon with purulent exudate consistent with acute diverticulitis.  A CT scan again revealed changes with diverticulitis.  In November 2019 he was seen in follow-up by Colorectal surgery and a partial colectomy was recommended because of recurrent diverticulitis.  This was delayed due to family medical issues.  In May of this year he presented to Colorectal surgery Clinic again with complaints of loose stools.  A repeat CT scan showed finding similar to all of his previous CT scans.  He underwent colonoscopy on October 5, 2020 because of ongoing diarrhea issues and was found to have inflamed ulcerated mucosa in the anus and rectum, sigmoid colon, distal descending colon.  The more proximal colon appeared normal.  There was also a mild anal stricture.  Biopsies from this colonoscopy revealed a normal terminal ileum.  Biopsies of the right colon revealed chronic and superficial acute colitis.  Left colon biopsies revealed chronic colitis as well as at least 1 rectal biopsy revealing an intramucosal granuloma.  He was started on Lialda in November 2020. He had a significant improvement after starting Lialda but at his 1st follow-up he was noted only be taking 1.2 g daily.  We increase the dose at that time to 2.4 g daily.  In March 2021 he had a continued elevation of his fecal calprotectin level so the dose was increased to 4.8 g daily.  His CRP had normalized.  A repeat stool calprotectin level in July 2021 showed a significant decline in the stool calprotectin.  Follow-up colonoscopy  in August 2022 showed resolution of the rectal inflammation and improvement in the sigmoid and descending colon inflammatory changes but still ongoing inflammation suspicious for segmental colitis associated with diverticular disease.  Biopsies did not definitively demonstrate typical findings of IBD in the sigmoid and descending colon.  No adjustments to his therapy were made as he was asymptomatic.    IBD Details:  Dx Date: 10/5/2020  Disease type/distribution:  Crohn's disease/colonic involvement (mostly rectum and sigmoid but with some mild descending inflammation endoscopically and more proximal colon involved histologically)  Current Treatment:  Lialda 4.8 g daily  Start Date:  November 2020  Response:  Symptom remission  Optimized:  Yes Adverse reactions:  None  Prior surgeries:  None  CRP Elevation: Y calprotectin:  Markedly elevated with active disease  Disease Complications:  None  Extraintestinal manifestations:  None  Prior treatments:   Steroids:  None  5ASA:  Currently on Lialda  IMM:  None  TNF Inh:  None   Anti-Integrin:  None   IL 12/23:  None  ADDISON Inh:  None    Previous Clinical Trials:  None    Last Colonoscopy:   August 5, 2022-resolution of rectal inflammation, ongoing inflammation in the sigmoid descending colon (biopsies not definitively consistent with IBD).  October 5, 2020-moderate inflammation of the rectum with less severe inflammation of the sigmoid and descending colon.  Remainder of the colon was normal appearing other diverticular disease.  Biopsies with pan colitis.  Ileum was normal and biopsies of the ileum were normal.    Other Endoscopies:   August 5, 2022-EGD for dysphagia with no significant esophageal stenosis.  Multiple previous colonoscopy reports reviewed with findings of inflammation in the sigmoid colon most consistent with segmental colitis associated with diverticular disease verses diverticulitis    Imaging:   MRE:  None   CT:  May 2020-no evidence of small-bowel  "inflammation   Other:  None    Pertinent Labs:  Lab Results   Component Value Date    SEDRATE 16 (H) 10/08/2018    CRP 4.6 05/22/2024     Lab Results   Component Value Date    TTGIGA 9 05/04/2020     05/04/2020     Lab Results   Component Value Date    TSH 1.965 05/04/2020     Lab Results   Component Value Date    VIHVLUYO18GC 38 05/22/2024    VVKJXKIV22 465 09/25/2024     Lab Results   Component Value Date    HEPBSAG Negative 11/23/2015    HEPCAB Negative 02/07/2019     No results found for: "SXD89TWFN"  Lab Results   Component Value Date    NIL 0.050 10/19/2020    MITOGENNIL 3.840 10/19/2020     No results found for: "TPTMINTERP", "TPMTRESULT"  Lab Results   Component Value Date    STOOLCULTURE  05/15/2020     No Salmonella,Shigella,Vibrio,Campylobacter,Yersinia isolated.    XLYRIPNENU4G Negative 05/15/2020    UHLOGPXUZJ4C Negative 05/15/2020    CDIFFICILEAN Negative 05/15/2020    CDIFFTOX Negative 05/15/2020     Lab Results   Component Value Date    CALPROTECTIN 140.5 (H) 07/22/2021       Therapeutic Drug Monitoring Labs:  No results found for: "PROMETH"  No results found for: "ANSADAINIT", "INFLIXIMAB", "INFLIXINTERP"    Vaccinations:  No results found for: "HEPBSAB"  Lab Results   Component Value Date    HEPAIGG Negative 10/19/2020     Lab Results   Component Value Date    VARICELLAZOS 3.11 (H) 03/15/2021    VARICELLAINT Positive (A) 03/15/2021     Immunization History   Administered Date(s) Administered    COVID-19 Vaccine 09/03/2024    COVID-19, MRNA, LN-S, PF (Pfizer) (Gray Cap) 04/08/2022    COVID-19, MRNA, LN-S, PF (Pfizer) (Purple Cap) 01/09/2021, 01/30/2021, 09/16/2021    COVID-19, mRNA, LNP-S, bivalent booster, PF (PFIZER OMICRON) 10/17/2022, 04/29/2023    Hepatitis A, Adult 03/01/2016    Influenza (FLUAD) - Quadrivalent - Adjuvanted - PF *Preferred* (65+) 04/08/2022    Influenza - Quadrivalent 09/18/2019    Influenza - Quadrivalent - High Dose - PF (65 years and older) 08/25/2020, 10/11/2022, " 09/06/2023    Influenza - Trivalent - Fluzone High Dose - PF (65 years and older) 10/15/2013, 09/10/2014, 10/24/2015, 12/13/2016, 10/02/2017, 09/11/2018    Influenza Whole 09/01/2015    Pneumococcal Conjugate - 13 Valent 03/01/2016, 12/13/2016    Pneumococcal Conjugate - 20 Valent 09/04/2024    Pneumococcal Polysaccharide - 23 Valent 09/10/2014    RSVpreF (Arexvy) 09/03/2024    Tdap 03/01/2016    Zoster 11/10/2014    Zoster Recombinant 12/13/2022, 04/29/2023         Review of Systems  Review of Systems   Constitutional:  Negative for chills, fever and weight loss.   HENT:  Negative for sore throat.    Eyes:  Negative for pain, discharge and redness.   Respiratory:  Negative for cough, shortness of breath and wheezing.    Cardiovascular:  Negative for chest pain and leg swelling.   Gastrointestinal:  Negative for abdominal pain, blood in stool, constipation, diarrhea, heartburn, melena, nausea and vomiting.   Genitourinary:  Negative for dysuria and frequency.   Musculoskeletal:  Negative for back pain, joint pain and myalgias.   Skin:  Negative for rash.   Neurological:  Negative for focal weakness and seizures.   Endo/Heme/Allergies:  Does not bruise/bleed easily.   Psychiatric/Behavioral:  Negative for depression. The patient is not nervous/anxious.        Medical History:   Past Medical History:   Diagnosis Date    Basal cell carcinoma     BPH (benign prostatic hyperplasia)     Chronic nonallergic rhinitis 5/28/2020    Colon polyp     Crohn's disease     Hyperlipidemia     Hypertension     Liver cyst 1/11/2016    Prostate nodule     Squamous cell carcinoma     Thyroid nodule 5/28/2020       Surgical History:  Past Surgical History:   Procedure Laterality Date    APPENDECTOMY      COLONOSCOPY N/A 6/22/2016    Procedure: COLONOSCOPY;  Surgeon: Marco Mathews MD;  Location: [address];  Service: Endoscopy;  Laterality: N/A;    COLONOSCOPY N/A 10/21/2019    Procedure: COLONOSCOPY;  Surgeon: Marco CALDERA  MD Bisi;  Location: [facility] (4TH FLR);  Service: Endoscopy;  Laterality: N/A;  First case of the day with me next available     COLONOSCOPY N/A 10/5/2020    Procedure: COLONOSCOPY;  Surgeon: LIZABETH Hall MD;  Location: [facility] (4TH FLR);  Service: Endoscopy;  Laterality: N/A;    COLONOSCOPY N/A 8/5/2022    Procedure: COLONOSCOPY;  Surgeon: Aidan Castro MD;  Location: [facility] (4TH FLR);  Service: Endoscopy;  Laterality: N/A;  Fully Vaccinated. Per  ok to use this date. EC    COLONOSCOPY W/ POLYPECTOMY      ENDOSCOPIC ULTRASOUND OF UPPER GASTROINTESTINAL TRACT N/A 6/24/2019    Procedure: ULTRASOUND, UPPER GI TRACT, ENDOSCOPIC;  Surgeon: Jeremy Saleem MD;  Location: [facility] (2ND FLR);  Service: Endoscopy;  Laterality: N/A;  For evaluation of low fecal elastase and loose stools and history of nonspecific finding of the EUS of the pancreas    ESOPHAGOGASTRODUODENOSCOPY      ESOPHAGOGASTRODUODENOSCOPY N/A 6/24/2019    Procedure: EGD (ESOPHAGOGASTRODUODENOSCOPY);  Surgeon: Jeremy Saleem MD;  Location: [facility] (2ND FLR);  Service: Endoscopy;  Laterality: N/A;  For evaluation of iron deficiency anemia    ESOPHAGOGASTRODUODENOSCOPY N/A 10/21/2019    Procedure: EGD (ESOPHAGOGASTRODUODENOSCOPY);  Surgeon: Marco Mathews MD;  Location: [facility] (4TH FLR);  Service: Endoscopy;  Laterality: N/A;  First case of the day with me next available     ESOPHAGOGASTRODUODENOSCOPY N/A 8/5/2022    Procedure: ESOPHAGOGASTRODUODENOSCOPY (EGD);  Surgeon: Aidan Castro MD;  Location: [facility] (4TH FLR);  Service: Endoscopy;  Laterality: N/A;    HERNIA REPAIR      KNEE ARTHROSCOPY W/ DEBRIDEMENT      TONSILLECTOMY, ADENOIDECTOMY      UPPER ENDOSCOPIC ULTRASOUND W/ FNA      VASECTOMY         Family History:   Family History   Problem Relation Name Age of Onset    Cancer Mother          melanoma    Cancer Father          skin cancer    Celiac disease Neg Hx      Cirrhosis Neg Hx      Colon cancer Neg Hx       Colon polyps Neg Hx      Crohn's disease Neg Hx      Esophageal cancer Neg Hx      Inflammatory bowel disease Neg Hx      Irritable bowel syndrome Neg Hx      Liver cancer Neg Hx      Rectal cancer Neg Hx      Stomach cancer Neg Hx      Ulcerative colitis Neg Hx         Social History:   Social History     Tobacco Use    Smoking status: Former     Current packs/day: 0.00     Average packs/day: 0.5 packs/day for 4.0 years (2.0 ttl pk-yrs)     Types: Cigarettes     Start date: 1962     Quit date: 1966     Years since quittin.2    Smokeless tobacco: Never    Tobacco comments:     as teenager   Substance Use Topics    Alcohol use: Yes     Alcohol/week: 14.0 standard drinks of alcohol     Types: 14 Glasses of wine per week     Comment: social    Drug use: No       Allergies: Reviewed    Home Medications:   Medication List with Changes/Refills   New Medications    LIPASE-PROTEASE-AMYLASE (CREON) 36,000-114,000- 180,000 UNIT CPDR    Take 3 capsules by mouth 3 (three) times daily with meals.   Current Medications    AMLODIPINE (NORVASC) 5 MG TABLET    TAKE 1 TABLET BY MOUTH EVERY DAY    ASCORBIC ACID, VITAMIN C, (VITAMIN C) 1000 MG TABLET    Take 1,000 mg by mouth once daily.    ASPIRIN (ECOTRIN) 81 MG EC TABLET    Take 81 mg by mouth once daily.    CETIRIZINE (ZYRTEC) 10 MG TABLET    Take 10 mg by mouth once daily.    CIPROFLOXACIN HCL (CIPRO) 500 MG TABLET    Take 1 tablet (500 mg total) by mouth 2 (two) times daily. For diverticulitis    ESCITALOPRAM OXALATE (LEXAPRO) 10 MG TABLET    TAKE 1 TABLET(10 MG) BY MOUTH DAILY    ESZOPICLONE (LUNESTA) 2 MG TAB    TAKE 1 TABLET BY MOUTH EVERY EVENING    FEXOFENADINE (ALLEGRA) 60 MG TABLET    Take 60 mg by mouth once daily.    LATANOPROST 0.005 % OPHTHALMIC SOLUTION    APPLY 1 DROP INTO BOTH EYES    MESALAMINE (LIALDA) 1.2 GRAM TBEC    Take 4 tablets (4.8 g total) by mouth daily with breakfast.    MIRABEGRON (MYRBETRIQ) 50 MG TB24    Take 1 tablet (50 mg total) by  "mouth once daily.    MULTIVITAMIN (THERAGRAN) PER TABLET    Take 1 tablet by mouth once daily.    PANTOPRAZOLE (PROTONIX) 20 MG TABLET    TAKE 1 TABLET(20 MG) BY MOUTH BEFORE BREAKFAST    ROSUVASTATIN (CRESTOR) 20 MG TABLET    TAKE 1 TABLET(20 MG) BY MOUTH EVERY DAY    TADALAFIL (CIALIS) 20 MG TAB    Take 1 tablet (20 mg total) by mouth daily as needed (as needed).       Physical Exam:  Vital Signs:  /78 (BP Location: Left arm, Patient Position: Sitting)   Pulse (!) 58   Temp 97.7 °F (36.5 °C) (Temporal)   Ht 5' 6" (1.676 m)   Wt 76.2 kg (167 lb 15.9 oz)   SpO2 (!) 94%   BMI 27.11 kg/m²   Body mass index is 27.11 kg/m².    Physical Exam  Vitals and nursing note reviewed.   Constitutional:       General: He is not in acute distress.     Appearance: Normal appearance. He is well-developed. He is not ill-appearing or toxic-appearing.   HENT:      Head: Normocephalic and atraumatic.   Eyes:      General: No scleral icterus.     Pupils: Pupils are equal, round, and reactive to light.   Neck:      Thyroid: No thyromegaly.   Cardiovascular:      Rate and Rhythm: Normal rate and regular rhythm.      Heart sounds: No murmur heard.     No friction rub.   Pulmonary:      Effort: Pulmonary effort is normal.      Breath sounds: Normal breath sounds. No wheezing or rales.   Abdominal:      General: Bowel sounds are normal. There is no distension.      Palpations: Abdomen is soft. There is no mass.      Tenderness: There is no abdominal tenderness. There is no guarding or rebound.   Musculoskeletal:      Right lower leg: No edema.      Left lower leg: No edema.   Lymphadenopathy:      Cervical: No cervical adenopathy.   Skin:     Findings: No rash.   Neurological:      General: No focal deficit present.      Mental Status: He is alert and oriented to person, place, and time.   Psychiatric:         Mood and Affect: Mood normal.         Behavior: Behavior normal.         Thought Content: Thought content normal.         " Judgment: Judgment normal.         Labs: reviewed and pertinent noted above    Assessment/Plan:  Alejandro Wayne is a 80 y.o. male with Crohn's colitis. The following issues were addresssed:    1. Chronic pancreatitis, unspecified pancreatitis type    2. Crohn's disease of colon without complication    3. Pancreatic insufficiency    4. Segmental colitis associated with diverticulosis      1.  Crohn's disease:  Stable.  Continue mesalamine.  Plan for colonoscopy in the near future.    2.  Pancreatic insufficiency:  Continue Creon.  Symptoms well controlled.    3. Segmental colitis associated with diverticulosis: Continue mesalamine.  Asymptomatic.  Continue to monitor.  Likely to have some mild ongoing inflammation on colonoscopy.        Ex smoker:  - recommended to continue smoking cessation  - discussed in detail that Crohn's disease can worsen with smoking and may make patient more resistant to treatment    # IBD specific health maintenance:  Colon cancer surveillance:  Up-to-date    Annual:  - Eye exam:  Discuss in the future  - Skin exam (if on IMM/TNF):  Scheduled  - reminded pt to use sunblock/hats/sunprotective clothing  - PAP (if immunosuppressed):  Not applicable    DEXA:  Discuss in the future-recommended due to age    Vitamin D:  Normal    Vaccines:    Influenza:  Up-to-date   Pneumovax:     PCV13:  Up-to-date    PSV23:  Up-to-date   HAV:  Discuss vaccination in the future   HBV:  Discuss vaccination in the future   Tdap:  Up-to-date   MMR:  Up-to-date   VZV:  Immune   HZV:  Ordered at previous visit   HPV:  Not applicable   Meningococcus:  Not applicable   COVID:  Vaccinated    Follow up: Follow up in about 6 months (around 1/21/2026).    Visit today is associated with current or anticipated ongoing medical care related to this patient's single serious condition/complex condition (Crohn's disease).      Thank you again for sending Alejandro Wayne to see Dr. Yury Castro today at the [facility]. Please don't hesitate to contact Dr. Castro if there are any questions regarding this evaluation, or if you have any other patients with inflammatory bowel disease for whom you would like a consultation. You can reach Dr. Castro at [phone] or by email at [email or web address]    Aidan Castro MD  Department of Gastroenterology  Inflammatory Bowel Disease

## 2025-07-21 NOTE — PATIENT INSTRUCTIONS
Continue mesalamine  Continue Creon  Colonoscopy in the near future - Colitis - Sutab - With Dr. Mathews  Follow up in 6 months with Dr. Mathews
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
## 2025-07-28 DIAGNOSIS — K50.10 CROHN'S DISEASE OF COLON WITHOUT COMPLICATION: ICD-10-CM

## 2025-07-28 RX ORDER — MESALAMINE 1.2 G/1
4.8 TABLET, DELAYED RELEASE ORAL
Qty: 360 TABLET | Refills: 0 | Status: SHIPPED | OUTPATIENT
Start: 2025-07-28

## 2025-07-28 NOTE — TELEPHONE ENCOUNTER
"Primary provider: Dr. Aidan Castro    IBD medications: Lialda 4.8 g daily     Refill request for:      Pended Medication(s)   Requested Prescriptions     Pending Prescriptions Disp Refills    mesalamine (LIALDA) 1.2 gram TbEC [Pharmacy Med Name: MESALAMINE 1.2GM TABLETS] 360 tablet 3     Sig: TAKE 4 TABLETS(4.8 GRAMS) BY MOUTH DAILY WITH BREAKFAST           Allergies reviewed: Yes    Drug Monitoring labs/frequency for all IBD meds:    CBC and CMP every 12 months    Lab Results   Component Value Date    HEPBSAG Negative 11/23/2015     Lab Results   Component Value Date    TBGOLDPLUS Negative 10/19/2020     No results found for: "QUANTNILVALU", "QUANTIFERON", "QUANTTBGDPL"   No results found for: "TSPOTSCREN"  Lab Results   Component Value Date    UXFGMWGI13KL 38 05/22/2024    MBFKJFPI22 465 09/25/2024     Lab Results   Component Value Date    WBC 4.36 01/10/2025    HGB 12.1 (L) 01/10/2025    HCT 35.8 (L) 01/10/2025    MCV 87 01/10/2025     01/10/2025     Lab Results   Component Value Date    CREATININE 0.7 10/31/2024    ALBUMIN 3.7 10/31/2024    BILITOT 0.4 10/31/2024    ALKPHOS 60 10/31/2024    AST 22 10/31/2024    ALT 23 10/31/2024     Lab Results   Component Value Date    CHOL 112 (L) 05/22/2024    HDL 65 05/22/2024    LDLCALC 39.8 (L) 05/22/2024    TRIG 36 05/22/2024       Lab due date (mo/yr):  10/205    Labs scheduled: No - will notify staff    Next appt: N/A    RX refill sent to provider for amount until next labs: Yes       "

## 2025-07-29 ENCOUNTER — TELEPHONE (OUTPATIENT)
Dept: GASTROENTEROLOGY | Facility: CLINIC | Age: 80
End: 2025-07-29
Payer: COMMERCIAL

## 2025-07-29 ENCOUNTER — TELEPHONE (OUTPATIENT)
Dept: DERMATOLOGY | Facility: CLINIC | Age: 80
End: 2025-07-29
Payer: COMMERCIAL

## 2025-07-29 DIAGNOSIS — K50.10 CROHN'S DISEASE OF COLON WITHOUT COMPLICATION: Primary | ICD-10-CM

## 2025-07-29 NOTE — TELEPHONE ENCOUNTER
Pt is referral from Dr. Chi for [facility] R superior lateral brow. Left message to give us a call back to get scheduled.

## 2025-08-05 ENCOUNTER — TELEPHONE (OUTPATIENT)
Dept: DERMATOLOGY | Facility: CLINIC | Age: 80
End: 2025-08-05
Payer: COMMERCIAL

## 2025-08-05 DIAGNOSIS — D04.39 SQUAMOUS CELL CARCINOMA IN SITU (SCCIS) OF SKIN OF EYEBROW: Primary | ICD-10-CM

## 2025-08-05 NOTE — TELEPHONE ENCOUNTER
EP is a referral from Dr. Esau Chi with [facility] on R superior lateral brow. Scheduled for mohs on 8/27 at 800 am. Pt confirmed date and time.

## 2025-08-25 ENCOUNTER — TELEPHONE (OUTPATIENT)
Dept: DERMATOLOGY | Facility: CLINIC | Age: 80
End: 2025-08-25
Payer: COMMERCIAL